# Patient Record
Sex: FEMALE | Race: WHITE | NOT HISPANIC OR LATINO | Employment: FULL TIME | URBAN - METROPOLITAN AREA
[De-identification: names, ages, dates, MRNs, and addresses within clinical notes are randomized per-mention and may not be internally consistent; named-entity substitution may affect disease eponyms.]

---

## 2017-01-03 ENCOUNTER — OFFICE VISIT (OUTPATIENT)
Dept: INTERNAL MEDICINE | Facility: CLINIC | Age: 52
End: 2017-01-03
Payer: COMMERCIAL

## 2017-01-03 VITALS
TEMPERATURE: 97.9 F | DIASTOLIC BLOOD PRESSURE: 80 MMHG | WEIGHT: 171 LBS | HEART RATE: 85 BPM | BODY MASS INDEX: 25.91 KG/M2 | OXYGEN SATURATION: 98 % | SYSTOLIC BLOOD PRESSURE: 110 MMHG | HEIGHT: 68 IN

## 2017-01-03 DIAGNOSIS — Z01.818 PREOP GENERAL PHYSICAL EXAM: Primary | ICD-10-CM

## 2017-01-03 LAB
ANION GAP SERPL CALCULATED.3IONS-SCNC: 8 MMOL/L (ref 3–14)
BUN SERPL-MCNC: 8 MG/DL (ref 7–30)
CALCIUM SERPL-MCNC: 9 MG/DL (ref 8.5–10.1)
CHLORIDE SERPL-SCNC: 105 MMOL/L (ref 94–109)
CO2 SERPL-SCNC: 27 MMOL/L (ref 20–32)
CREAT SERPL-MCNC: 0.76 MG/DL (ref 0.52–1.04)
GFR SERPL CREATININE-BSD FRML MDRD: 80 ML/MIN/1.7M2
GLUCOSE SERPL-MCNC: 93 MG/DL (ref 70–99)
HGB BLD-MCNC: 13.8 G/DL (ref 11.7–15.7)
POTASSIUM SERPL-SCNC: 3.9 MMOL/L (ref 3.4–5.3)
SODIUM SERPL-SCNC: 140 MMOL/L (ref 133–144)

## 2017-01-03 PROCEDURE — 99214 OFFICE O/P EST MOD 30 MIN: CPT | Performed by: INTERNAL MEDICINE

## 2017-01-03 PROCEDURE — 93000 ELECTROCARDIOGRAM COMPLETE: CPT | Performed by: INTERNAL MEDICINE

## 2017-01-03 PROCEDURE — 80048 BASIC METABOLIC PNL TOTAL CA: CPT | Performed by: INTERNAL MEDICINE

## 2017-01-03 PROCEDURE — 85018 HEMOGLOBIN: CPT | Performed by: INTERNAL MEDICINE

## 2017-01-03 PROCEDURE — 36415 COLL VENOUS BLD VENIPUNCTURE: CPT | Performed by: INTERNAL MEDICINE

## 2017-01-03 NOTE — PATIENT INSTRUCTIONS
Before Your Surgery      Call your surgeon if there is any change in your health. This includes signs of a cold or flu (such as a sore throat, runny nose, cough, rash or fever).    Do not smoke, drink alcohol or take over the counter medicine (unless your surgeon or primary care doctor tells you to) for the 24 hours before and after surgery.    If you take prescribed drugs: Follow your doctor s orders about which medicines to take and which to stop until after surgery.    Eating and drinking prior to surgery: follow the instructions from your surgeon    Take a shower or bath the night before surgery. Use the soap your surgeon gave you to gently clean your skin. If you do not have soap from your surgeon, use your regular soap. Do not shave or scrub the surgery site.  Wear clean pajamas and have clean sheets on your bed.

## 2017-01-03 NOTE — NURSING NOTE
"Chief Complaint   Patient presents with     Pre-Op Exam       Initial /80 mmHg  Pulse 85  Temp(Src) 97.9  F (36.6  C) (Oral)  Ht 5' 8\" (1.727 m)  Wt 171 lb (77.565 kg)  BMI 26.01 kg/m2  SpO2 98% Estimated body mass index is 26.01 kg/(m^2) as calculated from the following:    Height as of this encounter: 5' 8\" (1.727 m).    Weight as of this encounter: 171 lb (77.565 kg).  BP completed using cuff size: large    "

## 2017-01-03 NOTE — PROGRESS NOTES
[place] [address]  [facility] [address]  [phone]  Dept: [phone]    PRE-OP EVALUATION:  Today's date: 1/3/2017    Shanna Sutton (: 1965) presents for pre-operative evaluation assessment as requested by Dr. Donna Leon/ Aneesh Schuster.  She requires evaluation and anesthesia risk assessment prior to undergoing surgery/procedure for treatment of family history of breast cancer.  Proposed procedure: prophylactic double mastectomy    Date of Surgery/ Procedure: 17  Time of Surgery/ Procedure: pm  Hospital/Surgical Facility: [facility]  Fax number for surgical facility: [phone]  Primary Physician: Lexi Forrester  Type of Anesthesia Anticipated: General    Patient has a Health Care Directive or Living Will:  NO    1. NO - Do you have a history of heart attack, stroke, stent, bypass or surgery on an artery in the head, neck, heart or legs?  2. NO - Do you ever have any pain or discomfort in your chest?  3. NO - Do you have a history of  Heart Failure?  4. NO - Are you troubled by shortness of breath when: walking on the level, up a slight hill or at night?  5. NO - Do you currently have a cold, bronchitis or other respiratory infection?  6. NO - Do you have a cough, shortness of breath or wheezing?  7. NO - Do you sometimes get pains in the calves of your legs when you walk?  8. NO - Do you or anyone in your family have previous history of blood clots?  9. NO - Do you or does anyone in your family have a serious bleeding problem such as prolonged bleeding following surgeries or cuts?  10. NO - Have you ever had problems with anemia or been told to take iron pills?  11. NO - Have you had any abnormal blood loss such as black, tarry or bloody stools, or abnormal vaginal bleeding?  12. NO - Have you ever had a blood transfusion?  13. NO - Have you or any of your relatives ever had problems with anesthesia?  14. NO - Do you have sleep  apnea, excessive snoring or daytime drowsiness?  15. NO - Do you have any prosthetic heart valves?  16. NO - Do you have prosthetic joints?  17. NO - Is there any chance that you may be pregnant?      HPI:                                                       HYPERLIPIDEMIA - Patient has a long history of Hyperlipidemia,not on meds , on diet and exercise.                                                                                                                                                  .    MEDICAL HISTORY:                                                      Patient Active Problem List    Diagnosis Date Noted     Family history of colon cancer 11/05/2014     Priority: Medium     HYPERLIPIDEMIA LDL GOAL <130 10/31/2010     Priority: Medium     Papanicolaou smear of cervix with atypical squamous cells cannot exclude high grade squamous intraepithelial lesion (ASC-H) 08/21/2007     Priority: Medium     8/21/07 ASCUS pap - HPV  11/20/09 ASCUUS pap - HPV  2/14/11 ASCUS pap - HPV  3/2/11 WNL colposcopy  4/18/12 NIL dx pap to MD for review.       Benign neoplasm of skin      Priority: Medium     Problem list name updated by automated process. Provider to review       Mixed hyperlipidemia 08/03/2005     Priority: Medium        Past Medical History   Diagnosis Date     Slow transit constipation      irritable bowel syndrome  abstracted [id number]     Infectious mononucleosis      Mixed hyperlipidemia      Benign neoplasm of skin, site unspecified      ASCUS on Pap smear 8/21/07, 11/20/09, 2/14/11     - HPV, - HPV, -HPV     History of colposcopy with cervical biopsy 3/1/11     WNL       History reviewed. No pertinent past surgical history.       Current Outpatient Prescriptions   Medication Sig Dispense Refill     NO ACTIVE MEDICATIONS          OTC products: None, except as noted above    Allergies   Allergen Reactions     Amoxicillin       Latex Allergy: NO    Social History   Substance Use Topics     Smoking  "status: Never Smoker      Smokeless tobacco: Not on file     Alcohol Use: 0.0 oz/week     0 Standard drinks or equivalent per week      Comment: Rarely     History   Drug Use No       REVIEW OF SYSTEMS:                                                    C: NEGATIVE for fever, chills, change in weight  I: NEGATIVE for worrisome rashes, moles or lesions  E: NEGATIVE for vision changes or irritation  E/M: NEGATIVE for ear, mouth and throat problems  R: NEGATIVE for significant cough or SOB  B: NEGATIVE for masses, tenderness or discharge  CV: NEGATIVE for chest pain, palpitations or peripheral edema  GI: NEGATIVE for nausea, abdominal pain, heartburn, or change in bowel habits  : NEGATIVE for frequency, dysuria, or hematuria  M: NEGATIVE for significant arthralgias or myalgia  N: NEGATIVE for weakness, dizziness or paresthesias  E: NEGATIVE for temperature intolerance, skin/hair changes  H: NEGATIVE for bleeding problems  P: NEGATIVE for changes in mood or affect    EXAM:                                                    /80 mmHg  Pulse 85  Temp(Src) 97.9  F (36.6  C) (Oral)  Ht 5' 8\" (1.727 m)  Wt 171 lb (77.565 kg)  BMI 26.01 kg/m2  SpO2 98%    GENERAL APPEARANCE: healthy, alert and no distress     EYES: EOMI,- PERRL     HENT: ear canals and TM's normal and nose and mouth without ulcers or lesions     NECK: no adenopathy, no asymmetry, masses, or scars and thyroid normal to palpation     RESP: lungs clear to auscultation - no rales, rhonchi or wheezes     CV: regular rates and rhythm, normal S1 S2, no S3 or S4 and no murmur, click or rub -     ABDOMEN:  soft, nontender, no HSM or masses and bowel sounds normal     MS: extremities normal- no gross deformities noted, no evidence of inflammation in joints, FROM in all extremities.     NEURO: Normal strength and tone, sensory exam grossly normal, mentation intact and speech normal     PSYCH: mentation appears normal. and affect normal/bright    "     DIAGNOSTICS:                                                    EKG: Sinus Rhythm, normal axis, normal intervals, no acute ST/T changes c/w ischemia, no LVH by voltage criteria.  Hemoglobin- pending  Serum Potassium- pending  Serum Creatinine-- pending    Recent Labs   Lab Test  11/05/14   0832  04/18/12   0833   HGB  14.2  14.1   NA  139  138   POTASSIUM  4.1  4.0   CR  0.78  0.70        IMPRESSION:                                                      (Z01.818) Preop general physical exam  (primary encounter diagnosis)  Comment:Prophylactic Bilateral Mastectomy   Plan: Hemoglobin, Basic metabolic panel            The proposed surgical procedure is considered INTERMEDIATE risk.    REVISED CARDIAC RISK INDEX  The patient has the following serious cardiovascular risks for perioperative complications such as (MI, PE, VFib and 3  AV Block):  No serious cardiac risks      The patient has the following additional risks for perioperative complications:  No identified additional risks      ICD-10-CM    1. Preop general physical exam Z01.818 Hemoglobin     Basic metabolic panel       RECOMMENDATIONS:                                                          Anticoagulant or Antiplatelet Medication Use  ASPIRIN: Discontinue ASA 7  days prior to procedure to reduce bleeding risk.   NSAIDS:  Stop 3 days prior to surgery          Signed Electronically by: Lexi Forrester MD    Copy of this evaluation report is provided to requesting physician.    [place] Preop Guidelines

## 2017-01-11 ENCOUNTER — TRANSFERRED RECORDS (OUTPATIENT)
Dept: HEALTH INFORMATION MANAGEMENT | Facility: CLINIC | Age: 52
End: 2017-01-11

## 2017-01-26 ENCOUNTER — TRANSFERRED RECORDS (OUTPATIENT)
Dept: HEALTH INFORMATION MANAGEMENT | Facility: CLINIC | Age: 52
End: 2017-01-26

## 2017-04-03 ENCOUNTER — OFFICE VISIT (OUTPATIENT)
Dept: INTERNAL MEDICINE | Facility: CLINIC | Age: 52
End: 2017-04-03
Payer: COMMERCIAL

## 2017-04-03 VITALS
TEMPERATURE: 98.1 F | DIASTOLIC BLOOD PRESSURE: 70 MMHG | BODY MASS INDEX: 25.76 KG/M2 | SYSTOLIC BLOOD PRESSURE: 110 MMHG | HEART RATE: 99 BPM | WEIGHT: 170 LBS | OXYGEN SATURATION: 95 % | HEIGHT: 68 IN

## 2017-04-03 DIAGNOSIS — Z01.818 PREOP GENERAL PHYSICAL EXAM: Primary | ICD-10-CM

## 2017-04-03 LAB
HGB BLD-MCNC: 13.5 G/DL (ref 11.7–15.7)
POTASSIUM SERPL-SCNC: 4.4 MMOL/L (ref 3.4–5.3)

## 2017-04-03 PROCEDURE — 85018 HEMOGLOBIN: CPT | Performed by: INTERNAL MEDICINE

## 2017-04-03 PROCEDURE — 36415 COLL VENOUS BLD VENIPUNCTURE: CPT | Performed by: INTERNAL MEDICINE

## 2017-04-03 PROCEDURE — 84132 ASSAY OF SERUM POTASSIUM: CPT | Performed by: INTERNAL MEDICINE

## 2017-04-03 PROCEDURE — 99214 OFFICE O/P EST MOD 30 MIN: CPT | Performed by: INTERNAL MEDICINE

## 2017-04-03 NOTE — PROGRESS NOTES
[place] [address]  [facility] [address]  [phone]  Dept: [phone]    PRE-OP EVALUATION:  Today's date: 4/3/2017    Shanna Sutton (: 1965) presents for pre-operative evaluation assessment as requested by Dr. Aneesh Schuster.  She requires evaluation and anesthesia risk assessment prior to undergoing surgery/procedure for treatment of breast reconstruction .  Proposed procedure: bilateral breast reconstruction/implants    Date of Surgery/ Procedure: 17  Time of Surgery/ Procedure: 1130am  Hospital/Surgical Facility: [facility]  Fax number for surgical facility: [phone]  Primary Physician: Lexi Forrester  Type of Anesthesia Anticipated: General    Patient has a Health Care Directive or Living Will:  NO    1. NO - Do you have a history of heart attack, stroke, stent, bypass or surgery on an artery in the head, neck, heart or legs?  2. NO - Do you ever have any pain or discomfort in your chest?  3. NO - Do you have a history of  Heart Failure?  4. NO - Are you troubled by shortness of breath when: walking on the level, up a slight hill or at night?  5. NO - Do you currently have a cold, bronchitis or other respiratory infection?  6. NO - Do you have a cough, shortness of breath or wheezing?  7. NO - Do you sometimes get pains in the calves of your legs when you walk?  8. NO - Do you or anyone in your family have previous history of blood clots?  9. NO - Do you or does anyone in your family have a serious bleeding problem such as prolonged bleeding following surgeries or cuts?  10. NO - Have you ever had problems with anemia or been told to take iron pills?  11. NO - Have you had any abnormal blood loss such as black, tarry or bloody stools, or abnormal vaginal bleeding?  12. NO - Have you ever had a blood transfusion?  13. NO - Have you or any of your relatives ever had problems with anesthesia?  14. NO - Do you have sleep apnea, excessive snoring or  daytime drowsiness?  15. NO - Do you have any prosthetic heart valves?  16. NO - Do you have prosthetic joints?  17. NO - Is there any chance that you may be pregnant?      HPI:                                                       See problem list for active medical problems.  Problems all longstanding and stable, except as noted/documented.  See ROS for pertinent symptoms related to these conditions.                                                                                                  .    MEDICAL HISTORY:                                                      Patient Active Problem List    Diagnosis Date Noted     Family history of colon cancer 11/05/2014     Priority: Medium     HYPERLIPIDEMIA LDL GOAL <130 10/31/2010     Priority: Medium     Papanicolaou smear of cervix with atypical squamous cells cannot exclude high grade squamous intraepithelial lesion (ASC-H) 08/21/2007     Priority: Medium     8/21/07 ASCUS pap - HPV  11/20/09 ASCUUS pap - HPV  2/14/11 ASCUS pap - HPV  3/2/11 WNL colposcopy  4/18/12 NIL dx pap to MD for review.       Benign neoplasm of skin      Priority: Medium     Problem list name updated by automated process. Provider to review       Mixed hyperlipidemia 08/03/2005     Priority: Medium        Past Medical History:   Diagnosis Date     ASCUS on Pap smear 8/21/07, 11/20/09, 2/14/11    - HPV, - HPV, -HPV     Benign neoplasm of skin, site unspecified      History of colposcopy with cervical biopsy 3/1/11    WNL     Infectious mononucleosis      Mixed hyperlipidemia      Slow transit constipation     irritable bowel syndrome  abstracted [id number]       No past surgical history on file.     Current Outpatient Prescriptions   Medication Sig Dispense Refill     NO ACTIVE MEDICATIONS          OTC products: None, except as noted above      Allergies   Allergen Reactions     Amoxicillin       Latex Allergy: NO    Social History   Substance Use Topics     Smoking status: Never Smoker      "Smokeless tobacco: Not on file     Alcohol use 0.0 oz/week     0 Standard drinks or equivalent per week      Comment: Rarely     History   Drug Use No       REVIEW OF SYSTEMS:                                                    C: NEGATIVE for fever, chills, change in weight  I: NEGATIVE for worrisome rashes, moles or lesions  E: NEGATIVE for vision changes or irritation  E/M: NEGATIVE for ear, mouth and throat problems  R: NEGATIVE for significant cough or SOB  B: NEGATIVE for masses, tenderness or discharge  CV: NEGATIVE for chest pain, palpitations or peripheral edema  GI: NEGATIVE for nausea, abdominal pain, heartburn, or change in bowel habits  : NEGATIVE for frequency, dysuria, or hematuria  M: NEGATIVE for significant arthralgias or myalgia  N: NEGATIVE for weakness, dizziness or paresthesias  E: NEGATIVE for temperature intolerance, skin/hair changes  H: NEGATIVE for bleeding problems  P: NEGATIVE for changes in mood or affect    EXAM:                                                    /70 (BP Location: Left arm, Cuff Size: Adult Large)  Pulse 99  Temp 98.1  F (36.7  C) (Oral)  Ht 5' 8\" (1.727 m)  Wt 170 lb (77.1 kg)  LMP 03/22/2017  SpO2 95%  Breastfeeding? No  BMI 25.85 kg/m2    GENERAL APPEARANCE: healthy, alert and no distress     EYES: EOMI, PERRL     HENT: ear canals and TM's normal and nose and mouth without ulcers or lesions     NECK: no adenopathy, no asymmetry, masses, or scars and thyroid normal to palpation     RESP: lungs clear to auscultation - no rales, rhonchi or wheezes     CV: regular rates and rhythm, normal S1 S2, no S3 or S4 and no murmur, click or rub     ABDOMEN:  soft, nontender, no HSM or masses and bowel sounds normal     MS: extremities normal- no gross deformities noted, no evidence of inflammation in joints, FROM in all extremities.     NEURO: Normal strength and tone, sensory exam grossly normal, mentation intact and speech normal     PSYCH: mentation appears " normal. and affect normal/bright      DIAGNOSTICS:                                                    EKG: appears normal, NSR, normal axis, normal intervals, no acute ST/T changes c/w ischemia, no LVH by voltage criteria  Hemoglobin pending  Serum Potassium pending     Recent Labs   Lab Test  01/03/17   0825  11/05/14   0832   HGB  13.8  14.2   NA  140  139   POTASSIUM  3.9  4.1   CR  0.76  0.78        IMPRESSION:                                                        (Z01.818) Preop general physical exam  (primary encounter diagnosis)  Comment: bilateral breast reconstruction/implants  Plan: Hemoglobin, Potassium      (Z23) Need for Tdap vaccination  Plan: TDAP VACCINE (ADACEL)            The proposed surgical procedure is considered LOW risk.    REVISED CARDIAC RISK INDEX  The patient has the following serious cardiovascular risks for perioperative complications such as (MI, PE, VFib and 3  AV Block):  No serious cardiac risks    The patient has the following additional risks for perioperative complications:  No identified additional risks      ICD-10-CM    1. Preop general physical exam Z01.818 Hemoglobin     Potassium       RECOMMENDATIONS:                                                          Anticoagulant or Antiplatelet Medication Use  ASPIRIN: Discontinue ASA 7 days prior to procedure to reduce bleeding risk.  .  NSAIDS:  Stop  3 days prior to surgery            Signed Electronically by: Lexi Forrester MD    Copy of this evaluation report is provided to requesting physician.    [facility] Preop Guidelines

## 2017-04-03 NOTE — MR AVS SNAPSHOT
"              After Visit Summary   4/3/2017    Shanna [place]    MRN: [id number]           Patient Information     Date Of Birth          1965        Visit Information        Provider Department      4/3/2017 7:40 AM Lexi Forrester MD [facility]        Today's Diagnoses     Preop general physical exam    -  1       Follow-ups after your visit        Who to contact     If you have questions or need follow up information about today's clinic visit or your schedule please contact [facility] directly at [phone].  Normal or non-critical lab and imaging results will be communicated to you by [email or web address], letter or phone within 4 business days after the clinic has received the results. If you do not hear from us within 7 days, please contact the clinic through [employer] or phone. If you have a critical or abnormal lab result, we will notify you by phone as soon as possible.  Submit refill requests through [employer] or call your pharmacy and they will forward the refill request to us. Please allow 3 business days for your refill to be completed.          Additional Information About Your Visit        MyChart Information     [employer] gives you secure access to your electronic health record. If you see a primary care provider, you can also send messages to your care team and make appointments. If you have questions, please call your primary care clinic.  If you do not have a primary care provider, please call [phone] and they will assist you.        Care EveryWhere ID     This is your Care EveryWhere ID. This could be used by other organizations to access your [facility] medical records  [id number]        Your Vitals Were     Pulse Temperature Height Last Period Pulse Oximetry Breastfeeding?    99 98.1  F (36.7  C) (Oral) 5' 8\" (1.727 m) 03/22/2017 95% No    BMI (Body Mass Index)                   25.85 kg/m2            Blood Pressure from Last 3 Encounters:   04/03/17 " 110/70   01/03/17 110/80   12/14/15 117/78    Weight from Last 3 Encounters:   04/03/17 170 lb (77.1 kg)   01/03/17 171 lb (77.6 kg)   12/14/15 167 lb (75.8 kg)              We Performed the Following     Hemoglobin     Potassium        Primary Care Provider Office Phone # Fax #    Vanessai RENETTA MD Mitzy [phone]       [facility] [address] [facility] [address]        Thank you!     Thank you for choosing [facility]  for your care. Our goal is always to provide you with excellent care. Hearing back from our patients is one way we can continue to improve our services. Please take a few minutes to complete the written survey that you may receive in the mail after your visit with us. Thank you!             Your Updated Medication List - Protect others around you: Learn how to safely use, store and throw away your medicines at www.disposemymeds.org.          This list is accurate as of: 4/3/17  8:36 AM.  Always use your most recent med list.                   Brand Name Dispense Instructions for use    NO ACTIVE MEDICATIONS

## 2017-04-03 NOTE — NURSING NOTE
"Chief Complaint   Patient presents with     Pre-Op Exam     ABNW, breast reconstruction, 4/20/17       Initial /70 (BP Location: Left arm, Cuff Size: Adult Large)  Pulse 99  Temp 98.1  F (36.7  C) (Oral)  Ht 5' 8\" (1.727 m)  Wt 170 lb (77.1 kg)  LMP 03/22/2017  SpO2 95%  Breastfeeding? No  BMI 25.85 kg/m2 Estimated body mass index is 25.85 kg/(m^2) as calculated from the following:    Height as of this encounter: 5' 8\" (1.727 m).    Weight as of this encounter: 170 lb (77.1 kg).  Medication Reconciliation: complete   Liz Conway CMA      "

## 2017-04-18 ENCOUNTER — TELEPHONE (OUTPATIENT)
Dept: INTERNAL MEDICINE | Facility: CLINIC | Age: 52
End: 2017-04-18

## 2017-04-18 NOTE — TELEPHONE ENCOUNTER
Lyn from [facility], reports pt has surgery scheduled for tomorrow. Requesting pre-op OV note, labs, and latest EKG be faxed to: [phone]. Faxed requested information.

## 2017-08-08 ENCOUNTER — MYC MEDICAL ADVICE (OUTPATIENT)
Dept: INTERNAL MEDICINE | Facility: CLINIC | Age: 52
End: 2017-08-08

## 2017-08-08 DIAGNOSIS — Z12.11 SCREEN FOR COLON CANCER: Primary | ICD-10-CM

## 2017-08-08 NOTE — TELEPHONE ENCOUNTER
See her my chart message.     She has only had 2 pre op appointments. Last OV on 4/3/17.   No physical exams. May have done with GYN.     Please advise if OK for Colonoscopy.

## 2017-09-07 ENCOUNTER — OFFICE VISIT (OUTPATIENT)
Dept: OBGYN | Facility: CLINIC | Age: 52
End: 2017-09-07
Payer: COMMERCIAL

## 2017-09-07 VITALS
DIASTOLIC BLOOD PRESSURE: 78 MMHG | HEIGHT: 68 IN | BODY MASS INDEX: 25.61 KG/M2 | HEART RATE: 80 BPM | SYSTOLIC BLOOD PRESSURE: 110 MMHG | WEIGHT: 169 LBS

## 2017-09-07 DIAGNOSIS — N92.0 EXCESSIVE OR FREQUENT MENSTRUATION: Primary | ICD-10-CM

## 2017-09-07 LAB
ERYTHROCYTE [DISTWIDTH] IN BLOOD BY AUTOMATED COUNT: 13.5 % (ref 10–15)
HCT VFR BLD AUTO: 43.7 % (ref 35–47)
HGB BLD-MCNC: 14.3 G/DL (ref 11.7–15.7)
MCH RBC QN AUTO: 28.7 PG (ref 26.5–33)
MCHC RBC AUTO-ENTMCNC: 32.7 G/DL (ref 31.5–36.5)
MCV RBC AUTO: 88 FL (ref 78–100)
PLATELET # BLD AUTO: 264 10E9/L (ref 150–450)
RBC # BLD AUTO: 4.98 10E12/L (ref 3.8–5.2)
WBC # BLD AUTO: 8.5 10E9/L (ref 4–11)

## 2017-09-07 PROCEDURE — 84443 ASSAY THYROID STIM HORMONE: CPT | Performed by: OBSTETRICS & GYNECOLOGY

## 2017-09-07 PROCEDURE — 85027 COMPLETE CBC AUTOMATED: CPT | Performed by: OBSTETRICS & GYNECOLOGY

## 2017-09-07 PROCEDURE — 99203 OFFICE O/P NEW LOW 30 MIN: CPT | Performed by: OBSTETRICS & GYNECOLOGY

## 2017-09-07 PROCEDURE — 36415 COLL VENOUS BLD VENIPUNCTURE: CPT | Performed by: OBSTETRICS & GYNECOLOGY

## 2017-09-07 NOTE — PROGRESS NOTES
SUBJECTIVE:                                                   Shanna Sutton is a 52 year old female who presents to clinic today for menorrhagia for the last year. Periods are increasingly heavier, lasting up to 9 days with 4 days heavy with clots and cramping. She endorses hot flashes and night sweats, mostly night sweats. No dyspareunia or intermenstrual bleeding.    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 She is status post prophylactic double mastectomy earlier this year with reconstruction. See notes from [facility].    Problem list and histories reviewed & adjusted, as indicated.  Additional history: as documented.    Patient Active Problem List   Diagnosis     Mixed hyperlipidemia     Benign neoplasm of skin     HYPERLIPIDEMIA LDL GOAL <130     Papanicolaou smear of cervix with atypical squamous cells cannot exclude high grade squamous intraepithelial lesion (ASC-H)     Family history of colon cancer     Past Surgical History:   Procedure Laterality Date     Mastectomy Bilateral 01/11/2017    prophylactic double mastectomy: 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, 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      Social History   Substance Use Topics     Smoking status: Never Smoker     Smokeless tobacco: Never Used     Alcohol use 0.0 oz/week      Comment: Rarely      Problem (# of Occurrences) Relation (Name,Age of Onset)    Breast Cancer (4) Mother: age 65, Sister: diagnosed at age 43, Sister: diagnosed at age 58, Sister (Patricia)    Cancer - colorectal (1) Mother: age 73    Family History Negative (2) Brother: 6-healthy, Sister: 5-healthy    Respiratory (1) Father: COPD    Thyroid Disease (2) Sister: 3 sisters, Sister (Patricia)              Current Outpatient Prescriptions on File Prior to Visit:  NO ACTIVE MEDICATIONS      No current facility-administered medications  on file prior to visit.   Allergies   Allergen Reactions     Amoxicillin        ROS:  5 point ROS negative except as noted above in HPI, including Gen., Resp., CV, GI &  system review.    OBJECTIVE:     Exam:  Constitutional:  Appearance: Well nourished, well developed alert, in no acute distress  Gastrointestinal:  Abdominal Examination:  Abdomen nontender to palpation, tone normal without rigidity or guarding, no masses present, umbilicus without lesions; Liver/Spleen:  No hepatomegaly present, liver nontender to palpation; Hernias:  No hernias present  Lymphatic: Lymph Nodes:  No other lymphadenopathy present  Skin:General Inspection:  No rashes present, no lesions present, no areas of discoloration; Genitalia and Groin:  No rashes present, no lesions present, no areas of discoloration, no masses present.  Neurologic/Psychiatric:  Mental Status:  Oriented X3   Pelvic Exam:  External Genitalia:     Normal appearance for age, no discharge present, no tenderness present, no inflammatory lesions present, color normal  Vagina:     Normal vaginal vault without central or paravaginal defects, no discharge present, no inflammatory lesions present, no masses present  Bladder:     Nontender to palpation  Urethra:   Urethral Body:  Urethra palpation normal, urethra structural support normal   Urethral Meatus:  No erythema or lesions present  Cervix:      nontender to palpation, no bleeding present  Uterus:     Uterus: firm, normal to slightly enlarged sized and nontender, retroverted in position.   Adnexa:     No adnexal tenderness present, no adnexal masses present  Perineum:     Perineum within normal limits, no evidence of trauma, no rashes or skin lesions present  Anus:     Anus within normal limits, no hemorrhoids present  Inguinal Lymph Nodes:     No lymphadenopathy present  Pubic Hair:     Normal pubic hair distribution for age  Genitalia and Groin:     No rashes present, no lesions present, no areas of  discoloration, no masses present         In-Clinic Test Results:  No results found for this or any previous visit (from the past 24 hour(s)).    ASSESSMENT/PLAN:                                                        ICD-10-CM    1. Excessive or frequent menstruation N92.0 US Pelvic Complete with Transvaginal     CBC with platelets     TSH with free T4 reflex         Check labs and ultrasound first and will contact with results.    Patient counselled on etiologies of abnormal bleeding, evaluation for etiology, options for treatment including hormonal management, progestin IUD, endometrial ablation, hysterectomy.  Extensive information on all methods given, all questions answered.  Patient will review and follow-up for further treatment.    If she is interested in Mirena IUD placement, this can be done with her return appointment for endometrial biopsy, which will be scheduled after her ultrasound and lab results are back.    Nikole Yarbrough MD  [facility]

## 2017-09-07 NOTE — MR AVS SNAPSHOT
After Visit Summary   9/7/2017    Shanna Sutton    MRN: [id number]           Patient Information     Date Of Birth          1965        Visit Information        Provider Department      9/7/2017 8:45 AM Nikole Yarbrough MD [facility]        Today's Diagnoses     Excessive or frequent menstruation    -  1       Follow-ups after your visit        Your next 10 appointments already scheduled     Sep 11, 2017  5:00 PM CDT   Tea Short with Lexi Forrester MD   [facility] ([facility])    [address] [place] [id number] [phone]            Sep 21, 2017  3:30 PM CDT   US PELVIC COMPLETE W TRANSVAGINAL with RIUS1   [facility] ([facility])    [address]  [address]  [facility] [id number] [phone]           Please bring a list of your medicines (including vitamins, minerals and over-the-counter drugs). Also, tell your doctor about any allergies you may have. Wear comfortable clothes and leave your valuables at home.  Adults: Drink six 8-ounce glasses of fluid one hour before your exam. Do NOT empty your bladder.  If you need to empty your bladder before your exam, try to release only a little bit of urine. Then, drink another 8oz glass of fluid.  Children: Children who are potty trained should drink at least 4 cups (32 oz) of liquid 45 minutes to one hour prior to the exam. The child s bladder must be full in order to achieve a diagnostic exam. If your child is very uncomfortable or has an urgent need to pee, please notify a technologist; they will try to find out how much longer the wait may be and provide instructions to help relieve the pressure. Occasionally it is medically necessary to insert a urinary catheter to fill the bladder.  Please call the Imaging Department at your exam site with any questions.            Sep 26, 2017  8:00 AM CHERRY Metcalf  "Dermatology General with Stephani Mccormick PA-C   [facility] ([facility]    [address] [address] [phone]              Future tests that were ordered for you today     Open Future Orders        Priority Expected Expires Ordered    US Pelvic Complete with Transvaginal Routine  9/7/2018 9/7/2017            Who to contact     If you have questions or need follow up information about today's clinic visit or your schedule please contact [facility] directly at [phone].  Normal or non-critical lab and imaging results will be communicated to you by ZolkChart, letter or phone within 4 business days after the clinic has received the results. If you do not hear from us within 7 days, please contact the clinic through Command Informationt or phone. If you have a critical or abnormal lab result, we will notify you by phone as soon as possible.  Submit refill requests through Mixpanel or call your pharmacy and they will forward the refill request to us. Please allow 3 business days for your refill to be completed.          Additional Information About Your Visit        MyChart Information     Mixpanel gives you secure access to your electronic health record. If you see a primary care provider, you can also send messages to your care team and make appointments. If you have questions, please call your primary care clinic.  If you do not have a primary care provider, please call [phone] and they will assist you.        Care EveryWhere ID     This is your Care EveryWhere ID. This could be used by other organizations to access your [place] medical records  [id number]        Your Vitals Were     Pulse Height Last Period Breastfeeding? BMI (Body Mass Index)       80 5' 8\" (1.727 m) 07/19/2017 (Exact Date) No 25.7 kg/m2        Blood Pressure from Last 3 Encounters:   09/07/17 110/78   04/03/17 110/70   01/03/17 110/80    Weight from Last 3 " Encounters:   09/07/17 169 lb (76.7 kg)   04/03/17 170 lb (77.1 kg)   01/03/17 171 lb (77.6 kg)              We Performed the Following     CBC with platelets     TSH with free T4 reflex        Primary Care Provider Office Phone # Fax #    Matthewsuleman JACKSON MD Mitzy [phone]       [address] [facility] [address]        Equal Access to Services     [facility]: Hadii aad ku hadasho Soomaali, waaxda luqadaha, qaybta kaalmada adeegyada, waxay idiin hayaan adeeg ewaaraviola layolie . So [facility] [phone].    ATENCIÓN: Si habla español, tiene a hill disposición servicios gratuitos de asistencia lingüística. Llame al [phone].    We comply with applicable federal civil rights laws and [place] laws. We do not discriminate on the basis of race, color, national origin, age, disability sex, sexual orientation or gender identity.            Thank you!     Thank you for choosing [facility]  for your care. Our goal is always to provide you with excellent care. Hearing back from our patients is one way we can continue to improve our services. Please take a few minutes to complete the written survey that you may receive in the mail after your visit with us. Thank you!             Your Updated Medication List - Protect others around you: Learn how to safely use, store and throw away your medicines at www.disposemymeds.org.          This list is accurate as of: 9/7/17  9:29 AM.  Always use your most recent med list.                   Brand Name Dispense Instructions for use Diagnosis    NO ACTIVE MEDICATIONS

## 2017-09-07 NOTE — NURSING NOTE
"Chief Complaint   Patient presents with     Consult     Increased bleeding, clots and pain with periods x approx. 1 yr. Also becoming irregular.        Initial /78 (BP Location: Right arm, Patient Position: Sitting, Cuff Size: Adult Regular)  Pulse 80  Ht 5' 8\" (1.727 m)  Wt 169 lb (76.7 kg)  LMP 07/19/2017 (Exact Date)  Breastfeeding? No  BMI 25.7 kg/m2 Estimated body mass index is 25.7 kg/(m^2) as calculated from the following:    Height as of this encounter: 5' 8\" (1.727 m).    Weight as of this encounter: 169 lb (76.7 kg).  Medication Reconciliation: complete   Hoa Worrell LPN      "

## 2017-09-08 LAB — TSH SERPL DL<=0.005 MIU/L-ACNC: 1.53 MU/L (ref 0.4–4)

## 2017-09-11 ENCOUNTER — OFFICE VISIT (OUTPATIENT)
Dept: INTERNAL MEDICINE | Facility: CLINIC | Age: 52
End: 2017-09-11
Payer: COMMERCIAL

## 2017-09-11 VITALS
DIASTOLIC BLOOD PRESSURE: 80 MMHG | OXYGEN SATURATION: 97 % | SYSTOLIC BLOOD PRESSURE: 104 MMHG | WEIGHT: 168.5 LBS | HEIGHT: 68 IN | TEMPERATURE: 97.7 F | BODY MASS INDEX: 25.54 KG/M2

## 2017-09-11 DIAGNOSIS — K21.9 GASTROESOPHAGEAL REFLUX DISEASE, ESOPHAGITIS PRESENCE NOT SPECIFIED: Primary | ICD-10-CM

## 2017-09-11 PROCEDURE — 99214 OFFICE O/P EST MOD 30 MIN: CPT | Performed by: INTERNAL MEDICINE

## 2017-09-11 RX ORDER — OMEPRAZOLE 40 MG/1
40 CAPSULE, DELAYED RELEASE ORAL DAILY
Qty: 30 CAPSULE | Refills: 1 | Status: SHIPPED | OUTPATIENT
Start: 2017-09-11 | End: 2017-12-06

## 2017-09-11 NOTE — MR AVS SNAPSHOT
After Visit Summary   9/11/2017    Shanna Sutton    MRN: [id number]           Patient Information     Date Of Birth          1965        Visit Information        Provider Department      9/11/2017 5:00 PM Lexi Forrester MD [facility]        Today's Diagnoses     Gastroesophageal reflux disease, esophagitis presence not specified    -  1       Follow-ups after your visit        Your next 10 appointments already scheduled     Sep 21, 2017  3:30 PM CDT   US PELVIC COMPLETE W TRANSVAGINAL with RIUS1   [facility] ([facility])    [address]  [address]  [facility] [address] [phone]           Please bring a list of your medicines (including vitamins, minerals and over-the-counter drugs). Also, tell your doctor about any allergies you may have. Wear comfortable clothes and leave your valuables at home.  Adults: Drink six 8-ounce glasses of fluid one hour before your exam. Do NOT empty your bladder.  If you need to empty your bladder before your exam, try to release only a little bit of urine. Then, drink another 8oz glass of fluid.  Children: Children who are potty trained should drink at least 4 cups (32 oz) of liquid 45 minutes to one hour prior to the exam. The child s bladder must be full in order to achieve a diagnostic exam. If your child is very uncomfortable or has an urgent need to pee, please notify a technologist; they will try to find out how much longer the wait may be and provide instructions to help relieve the pressure. Occasionally it is medically necessary to insert a urinary catheter to fill the bladder.  Please call the Imaging Department at your exam site with any questions.            Sep 26, 2017  8:00 AM CDT   MyChart Dermatology General with Stephani Mccormick PA-C   [facility] ([facility])    [address] [address]  "  [phone]              Who to contact     If you have questions or need follow up information about today's clinic visit or your schedule please contact [facility] directly at [phone].  Normal or non-critical lab and imaging results will be communicated to you by MyChart, letter or phone within 4 business days after the clinic has received the results. If you do not hear from us within 7 days, please contact the clinic through Cactushart or phone. If you have a critical or abnormal lab result, we will notify you by phone as soon as possible.  Submit refill requests through Fourandhalf or call your pharmacy and they will forward the refill request to us. Please allow 3 business days for your refill to be completed.          Additional Information About Your Visit        Fourandhalf Information     Fourandhalf gives you secure access to your electronic health record. If you see a primary care provider, you can also send messages to your care team and make appointments. If you have questions, please call your primary care clinic.  If you do not have a primary care provider, please call [phone] and they will assist you.        Care EveryWhere ID     This is your Care EveryWhere ID. This could be used by other organizations to access your [place] medical records  [id number]        Your Vitals Were     Temperature Height Last Period Pulse Oximetry BMI (Body Mass Index)       97.7  F (36.5  C) (Oral) 5' 8\" (1.727 m) 07/19/2017 (Exact Date) 97% 25.62 kg/m2        Blood Pressure from Last 3 Encounters:   09/11/17 104/80   09/07/17 110/78   04/03/17 110/70    Weight from Last 3 Encounters:   09/11/17 168 lb 8 oz (76.4 kg)   09/07/17 169 lb (76.7 kg)   04/03/17 170 lb (77.1 kg)              Today, you had the following     No orders found for display         Today's Medication Changes          These changes are accurate as of: 9/11/17  5:21 PM.  If you have any questions, ask your nurse or doctor.          "      Start taking these medicines.        Dose/Directions    omeprazole 40 MG capsule   Commonly known as:  priLOSEC   Used for:  Gastroesophageal reflux disease, esophagitis presence not specified   Started by:  Lexi Forrester MD        Dose:  40 mg   Take 1 capsule (40 mg) by mouth daily Take 30-60 minutes before a meal.   Quantity:  30 capsule   Refills:  1            Where to get your medicines      These medications were sent to [facility]/pharmacy #[id number] - [place], [place] - [address]  [address], [place] [address]     Phone:  [phone]     omeprazole 40 MG capsule                Primary Care Provider Office Phone # Fax #    Lexi Forrester [phone]       [address] [place] [address]        Equal Access to Services     [facility]: Hadii ursula colin hadasho Sojames, waaxda luqadaha, qaybta kaalmada adeegyada, earnestine mcbride . So [facility] [phone].    ATENCIÓN: Si habla español, tiene a hill disposición servicios gratuitos de asistencia lingüística. Llame al [phone].    We comply with applicable federal civil rights laws and [place] laws. We do not discriminate on the basis of race, color, national origin, age, disability sex, sexual orientation or gender identity.            Thank you!     Thank you for choosing [facility]  for your care. Our goal is always to provide you with excellent care. Hearing back from our patients is one way we can continue to improve our services. Please take a few minutes to complete the written survey that you may receive in the mail after your visit with us. Thank you!             Your Updated Medication List - Protect others around you: Learn how to safely use, store and throw away your medicines at www.disposemymeds.org.          This list is accurate as of: 9/11/17  5:21 PM.  Always use your most recent med list.                   Brand Name Dispense  Instructions for use Diagnosis    NO ACTIVE MEDICATIONS           omeprazole 40 MG capsule    priLOSEC    30 capsule    Take 1 capsule (40 mg) by mouth daily Take 30-60 minutes before a meal.    Gastroesophageal reflux disease, esophagitis presence not specified

## 2017-09-11 NOTE — NURSING NOTE
"Chief Complaint   Patient presents with     Throat Problem     Heartburn - having trouble swalling - painful while eating started at the beginning of summer and getting worse       Initial /80 (BP Location: Right arm, Patient Position: Sitting, Cuff Size: Adult Large)  Temp 97.7  F (36.5  C) (Oral)  Ht 5' 8\" (1.727 m)  Wt 168 lb 8 oz (76.4 kg)  LMP 07/19/2017 (Exact Date)  SpO2 97%  BMI 25.62 kg/m2 Estimated body mass index is 25.62 kg/(m^2) as calculated from the following:    Height as of this encounter: 5' 8\" (1.727 m).    Weight as of this encounter: 168 lb 8 oz (76.4 kg).  Medication Reconciliation: complete   Laurel Mora MA    "

## 2017-09-21 ENCOUNTER — RADIANT APPOINTMENT (OUTPATIENT)
Dept: ULTRASOUND IMAGING | Facility: CLINIC | Age: 52
End: 2017-09-21
Attending: OBSTETRICS & GYNECOLOGY
Payer: COMMERCIAL

## 2017-09-21 DIAGNOSIS — N92.0 EXCESSIVE OR FREQUENT MENSTRUATION: ICD-10-CM

## 2017-09-21 PROCEDURE — 76830 TRANSVAGINAL US NON-OB: CPT | Performed by: FAMILY MEDICINE

## 2017-09-21 PROCEDURE — 76856 US EXAM PELVIC COMPLETE: CPT | Performed by: FAMILY MEDICINE

## 2017-09-26 ENCOUNTER — OFFICE VISIT (OUTPATIENT)
Dept: DERMATOLOGY | Facility: CLINIC | Age: 52
End: 2017-09-26
Payer: COMMERCIAL

## 2017-09-26 VITALS — SYSTOLIC BLOOD PRESSURE: 111 MMHG | HEART RATE: 79 BPM | OXYGEN SATURATION: 96 % | DIASTOLIC BLOOD PRESSURE: 78 MMHG

## 2017-09-26 DIAGNOSIS — D48.5 NEOPLASM OF UNCERTAIN BEHAVIOR OF SKIN: Primary | ICD-10-CM

## 2017-09-26 DIAGNOSIS — L82.1 SEBORRHEIC KERATOSIS: ICD-10-CM

## 2017-09-26 DIAGNOSIS — L81.4 LENTIGO: ICD-10-CM

## 2017-09-26 DIAGNOSIS — D22.9 NEVUS: ICD-10-CM

## 2017-09-26 DIAGNOSIS — D18.00 ANGIOMA: ICD-10-CM

## 2017-09-26 PROCEDURE — 11100 HC BIOPSY SKIN/SUBQ/MUC MEM, SINGLE LESION: CPT | Performed by: PHYSICIAN ASSISTANT

## 2017-09-26 PROCEDURE — 99204 OFFICE O/P NEW MOD 45 MIN: CPT | Mod: 25 | Performed by: PHYSICIAN ASSISTANT

## 2017-09-26 PROCEDURE — 88305 TISSUE EXAM BY PATHOLOGIST: CPT | Performed by: PHYSICIAN ASSISTANT

## 2017-09-26 NOTE — PROGRESS NOTES
HPI:   Shanna Sutton is a 52 year old female who presents for Full skin cancer screening.  chief complaint  Last Skin Exam: n/a      1st Baseline: yes  Personal HX of Skin Cancer: no   Personal HX of Malignant Melanoma: no   Family HX of Skin Cancer / Malignant Melanoma: Dad with a h/o BCC on the nose  Personal HX of Atypical Moles:   no  Risk factors: frequent sun exposure in youth  New / Changing lesions: no  Social History: Just had prophylactic double mastectomy - 4 sisters and a niece with breast CA and they do have a mutation (not brca). She has 2 daughters. Works in [employer].   On review of systems, there are no further skin complaints, patient is feeling otherwise well.  See patient intake sheet.  ROS of the following were done and are negative: Constitutional, Eyes, Ears, Nose,   Mouth, Throat, Cardiovascular, Respiratory, GI, Genitourinary, Musculoskeletal,   Psychiatric, Endocrine, Allergic/Immunologic.    PHYSICAL EXAM:   Weight:  BP:   Skin exam performed as follows: Type 2 skin. Mood appropriate  Alert and Oriented X 3. Well developed, well nourished in no distress.  General appearance: Normal  Head including face: Normal  Eyes: conjunctiva and lids: Normal  Mouth: Lips, teeth, gums: Normal  Neck: Normal  Chest-breast/axillae: Normal  Back: Normal  Spleen and liver: Normal  Cardiovascular: Exam of peripheral vascular system by observation for swelling, varicosities, edema: Normal  Genitalia: groin, buttocks: Normal  Extremities: digits/nails (clubbing): Normal  Eccrine and Apocrine glands: Normal  Right upper extremity: Normal  Left upper extremity: Normal  Right lower extremity: Normal  Left lower extremity: Normal  Skin: Scalp and body hair: See below    Pt deferred exam of breasts, groin, buttocks: No    Other physical findings:  1. Multiple pigmented macules on extremities and trunk  2. Multiple pigmented macules on face, trunk and extremities  3. Multiple vascular papules  on trunk, arms and legs  4. Multiple scattered keratotic plaques  5. 7 mm irregular brown/tan macule on right clavicle       Except as noted above, no other signs of skin cancer or melanoma.     ASSESSMENT/PLAN:   Benign Full skin cancer screening today. . Patient with history of none  Advised on monthly self exams and 1 year  Patient Education: Appropriate brochures given.    Multiple benign appearing nevi on arms, legs and trunk. Discussed ABCDEs of melanoma and sunscreen.   Multiple lentigos on arms, legs and trunk. Advised benign, no treatment needed.  Multiple scattered angiomas. Advised benign, no treatment needed.   Seborrheic keratosis on arms, legs and trunk. Advised benign, no treatment needed.  Atypical nevus on right clavicle - advised. Anesthestized with lidocaine and area cleaned with betadine. Shave removal performed and specimen placed in formalin. Patient will receive call with results.         Follow-up: yearly FSE/pending path/PRN sooner    1.) Patient was asked about new and changing moles. YES  2.) Patient received a complete physical skin examination: YES  3.) Patient was counseled to perform a monthly self skin examination: YES  Scribed By: Stephani Mccormick, MS, PA-C

## 2017-09-26 NOTE — PATIENT INSTRUCTIONS
Wound Care Instructions     FOR SUPERFICIAL WOUNDS     [facility] [phone]                 AFTER 24 HOURS YOU SHOULD REMOVE THE BANDAGE AND BEGIN DAILY DRESSING CHANGES AS FOLLOWS:     1) Remove Dressing.     2) Clean and dry the area with tap water using a Q-tip or sterile gauze pad.     3) Apply Vaseline, Aquaphor, Polysporin ointment or Bacitracin ointment over entire wound.  Do NOT use Neosporin ointment.     4) Cover the wound with a band-aid, or a sterile non-stick gauze pad and micropore paper tape      REPEAT THESE INSTRUCTIONS AT LEAST ONCE A DAY UNTIL THE WOUND HAS COMPLETELY HEALED.    It is an old wives tale that a wound heals better when it is exposed to air and allowed to dry out. The wound will heal faster with a better cosmetic result if it is kept moist with ointment and covered with a bandage.    **Do not let the wound dry out.**      Supplies Needed:      *Cotton tipped applicators (Q-tips)    *Polysporin Ointment or Bacitracin Ointment (NOT NEOSPORIN)    *Band-aids or non-stick gauze pads and micropore paper tape.      PATIENT INFORMATION:    During the healing process you will notice a number of changes. All wounds develop a small halo of redness surrounding the wound.  This means healing is occurring. Severe itching with extensive redness usually indicates sensitivity to the ointment or bandage tape used to dress the wound.  You should call our office if this develops.      Swelling  and/or discoloration around your surgical site is common, particularly when performed around the eye.    All wounds normally drain.  The larger the wound the more drainage there will be.  After 7-10 days, you will notice the wound beginning to shrink and new skin will begin to grow.  The wound is healed when you can see skin has formed over the entire area.  A healed wound has a healthy, shiny look to the surface and is red to dark pink in color to normalize.  Wounds may take approximately 4-6  weeks to heal.  Larger wounds may take 6-8 weeks.  After the wound is healed you may discontinue dressing changes.    You may experience a sensation of tightness as your wound heals. This is normal and will gradually subside.    Your healed wound may be sensitive to temperature changes. This sensitivity improves with time, but if you re having a lot of discomfort, try to avoid temperature extremes.    Patients frequently experience itching after their wound appears to have healed because of the continue healing under the skin.  Plain Vaseline will help relieve the itching.        POSSIBLE COMPLICATIONS    BLEEDIN. Leave the bandage in place.  2. Use tightly rolled up gauze or a cloth to apply direct pressure over the bandage for 30  minutes.  3. Reapply pressure for an additional 30 minutes if necessary  4. Use additional gauze and tape to maintain pressure once the bleeding has stopped.

## 2017-09-26 NOTE — MR AVS SNAPSHOT
After Visit Summary   9/26/2017    Shanna Sutton    MRN: [id number]           Patient Information     Date Of Birth          1965        Visit Information        Provider Department      9/26/2017 8:00 AM Stephani Mccormick PA-C [facility]        Today's Diagnoses     Neoplasm of uncertain behavior of skin    -  1      Care Instructions      Wound Care Instructions     FOR SUPERFICIAL WOUNDS     [facility] [phone]                 AFTER 24 HOURS YOU SHOULD REMOVE THE BANDAGE AND BEGIN DAILY DRESSING CHANGES AS FOLLOWS:     1) Remove Dressing.     2) Clean and dry the area with tap water using a Q-tip or sterile gauze pad.     3) Apply Vaseline, Aquaphor, Polysporin ointment or Bacitracin ointment over entire wound.  Do NOT use Neosporin ointment.     4) Cover the wound with a band-aid, or a sterile non-stick gauze pad and micropore paper tape      REPEAT THESE INSTRUCTIONS AT LEAST ONCE A DAY UNTIL THE WOUND HAS COMPLETELY HEALED.    It is an old wives tale that a wound heals better when it is exposed to air and allowed to dry out. The wound will heal faster with a better cosmetic result if it is kept moist with ointment and covered with a bandage.    **Do not let the wound dry out.**      Supplies Needed:      *Cotton tipped applicators (Q-tips)    *Polysporin Ointment or Bacitracin Ointment (NOT NEOSPORIN)    *Band-aids or non-stick gauze pads and micropore paper tape.      PATIENT INFORMATION:    During the healing process you will notice a number of changes. All wounds develop a small halo of redness surrounding the wound.  This means healing is occurring. Severe itching with extensive redness usually indicates sensitivity to the ointment or bandage tape used to dress the wound.  You should call our office if this develops.      Swelling  and/or discoloration around your surgical site is common, particularly when performed around the eye.    All  wounds normally drain.  The larger the wound the more drainage there will be.  After 7-10 days, you will notice the wound beginning to shrink and new skin will begin to grow.  The wound is healed when you can see skin has formed over the entire area.  A healed wound has a healthy, shiny look to the surface and is red to dark pink in color to normalize.  Wounds may take approximately 4-6 weeks to heal.  Larger wounds may take 6-8 weeks.  After the wound is healed you may discontinue dressing changes.    You may experience a sensation of tightness as your wound heals. This is normal and will gradually subside.    Your healed wound may be sensitive to temperature changes. This sensitivity improves with time, but if you re having a lot of discomfort, try to avoid temperature extremes.    Patients frequently experience itching after their wound appears to have healed because of the continue healing under the skin.  Plain Vaseline will help relieve the itching.        POSSIBLE COMPLICATIONS    BLEEDIN. Leave the bandage in place.  2. Use tightly rolled up gauze or a cloth to apply direct pressure over the bandage for 30  minutes.  3. Reapply pressure for an additional 30 minutes if necessary  4. Use additional gauze and tape to maintain pressure once the bleeding has stopped.            Follow-ups after your visit        Who to contact     If you have questions or need follow up information about today's clinic visit or your schedule please contact [facility] directly at [phone].  Normal or non-critical lab and imaging results will be communicated to you by MyChart, letter or phone within 4 business days after the clinic has received the results. If you do not hear from us within 7 days, please contact the clinic through MyChart or phone. If you have a critical or abnormal lab result, we will notify you by phone as soon as possible.  Submit refill requests through [employer] or call your  pharmacy and they will forward the refill request to us. Please allow 3 business days for your refill to be completed.          Additional Information About Your Visit        [employer] Information     [employer]ickr gives you secure access to your electronic health record. If you see a primary care provider, you can also send messages to your care team and make appointments. If you have questions, please call your primary care clinic.  If you do not have a primary care provider, please call [phone] and they will assist you.        Care EveryWhere ID     This is your Care EveryWhere ID. This could be used by other organizations to access your [place] medical records  [id number]        Your Vitals Were     Pulse Last Period Pulse Oximetry             79 07/19/2017 (Exact Date) 96%          Blood Pressure from Last 3 Encounters:   09/26/17 111/78   09/11/17 104/80   09/07/17 110/78    Weight from Last 3 Encounters:   09/11/17 76.4 kg (168 lb 8 oz)   09/07/17 76.7 kg (169 lb)   04/03/17 77.1 kg (170 lb)              We Performed the Following     BIOPSY SKIN/SUBQ/MUC MEM, SINGLE LESION     Surgical pathology exam          Today's Medication Changes          These changes are accurate as of: 9/26/17  8:28 AM.  If you have any questions, ask your nurse or doctor.               Stop taking these medicines if you haven't already. Please contact your care team if you have questions.     NO ACTIVE MEDICATIONS   Stopped by:  Stephani Mccormick PA-C                    Primary Care Provider Office Phone # Fax #    Krishnakumari G MD Mitzy [phone]       [facility] [id number]        Equal Access to Services     [facility]: Hadii aad ku hadasho Soomaali, waaxda luqadaha, qaybta kaalmada earnestine amos. So [facility] [phone].    ATENCIÓN: Si habla español, tiene a hill disposición servicios gratuitos de asistencia lingüística. Llame al [phone].    We  comply with applicable federal civil rights laws and [place] laws. We do not discriminate on the basis of race, color, national origin, age, disability sex, sexual orientation or gender identity.            Thank you!     Thank you for choosing [facility]  for your care. Our goal is always to provide you with excellent care. Hearing back from our patients is one way we can continue to improve our services. Please take a few minutes to complete the written survey that you may receive in the mail after your visit with us. Thank you!             Your Updated Medication List - Protect others around you: Learn how to safely use, store and throw away your medicines at www.disposemymeds.org.          This list is accurate as of: 9/26/17  8:28 AM.  Always use your most recent med list.                   Brand Name Dispense Instructions for use Diagnosis    omeprazole 40 MG capsule    priLOSEC    30 capsule    Take 1 capsule (40 mg) by mouth daily Take 30-60 minutes before a meal.    Gastroesophageal reflux disease, esophagitis presence not specified

## 2017-09-26 NOTE — NURSING NOTE
"Chief Complaint   Patient presents with     Derm Problem     FSe       Initial /78  Pulse 79  LMP 07/19/2017 (Exact Date)  SpO2 96% Estimated body mass index is 25.62 kg/(m^2) as calculated from the following:    Height as of 9/11/17: 1.727 m (5' 8\").    Weight as of 9/11/17: 76.4 kg (168 lb 8 oz).  Medication Reconciliation: complete    "

## 2017-10-02 LAB — COPATH REPORT: NORMAL

## 2017-10-03 ENCOUNTER — TELEPHONE (OUTPATIENT)
Dept: FAMILY MEDICINE | Facility: CLINIC | Age: 52
End: 2017-10-03

## 2017-10-26 ENCOUNTER — OFFICE VISIT (OUTPATIENT)
Dept: DERMATOLOGY | Facility: CLINIC | Age: 52
End: 2017-10-26
Payer: COMMERCIAL

## 2017-10-26 VITALS — OXYGEN SATURATION: 100 % | DIASTOLIC BLOOD PRESSURE: 77 MMHG | SYSTOLIC BLOOD PRESSURE: 111 MMHG | HEART RATE: 68 BPM

## 2017-10-26 DIAGNOSIS — D48.5 NEOPLASM OF UNCERTAIN BEHAVIOR OF SKIN: Primary | ICD-10-CM

## 2017-10-26 PROCEDURE — 11602 EXC TR-EXT MAL+MARG 1.1-2 CM: CPT | Performed by: DERMATOLOGY

## 2017-10-26 PROCEDURE — 88331 PATH CONSLTJ SURG 1 BLK 1SPC: CPT | Performed by: DERMATOLOGY

## 2017-10-26 NOTE — LETTER
10/26/2017         RE: Shanna Sutton  [address]  [place] [address]        Dear Colleague,    Thank you for referring your patient, Shanna Sutton, to the [facility]. Please see a copy of my visit note below.    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  Patient reports the following modifying factors none.  Associated symptoms: none.  Patient has no other skin complaints today.  Remainder of the HPI, Meds, PMH, Allergies, FH, and SH was reviewed in chart.      Past Medical History:   Diagnosis Date     ASCUS on Pap smear 8/21/07, 11/20/09, 2/14/11    - HPV, - HPV, -HPV     Benign neoplasm of skin, site unspecified      Family history of colon cancer      Family history of colon cancer      History of colposcopy with cervical biopsy 3/1/11    WNL     Infectious mononucleosis      Mixed hyperlipidemia      Slow transit constipation     irritable bowel syndrome  abstracted [id number]       Past Surgical History:   Procedure Laterality Date     BREAST SURGERY  1/11/2017    prophylactic double mastectomy     Mastectomy Bilateral 01/11/2017    prophylactic double mastectomy: 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, 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        Family History   Problem Relation Age of Onset     Breast Cancer Mother      age 65     Cancer - colorectal Mother      age 73     Respiratory Father      COPD     Skin Cancer Father      Family History Negative Brother      6-healthy     Family History Negative Sister      5-healthy     Breast Cancer Sister      diagnosed at age 43     Thyroid Disease Sister      3 sisters     Breast Cancer Sister      diagnosed at age 58     Breast Cancer Sister      Thyroid Disease Sister        Social History     Social History     Marital status:      Spouse name: Heath     Number of children: 2     Years of education: N/A     Occupational History      [employer]      Social History Main Topics     Smoking status: Never Smoker     Smokeless tobacco: Never Used     Alcohol use 0.0 oz/week      Comment: Rarely     Drug use: No     Sexual activity: Yes     Partners: Male     Birth control/ protection: Male Surgical      Comment: vasectomy     Other Topics Concern     Parent/Sibling W/ Cabg, Mi Or Angioplasty Before 65f 55m? No     Social History Narrative       Outpatient Encounter Prescriptions as of 10/26/2017   Medication Sig Dispense Refill     omeprazole (PRILOSEC) 40 MG capsule Take 1 capsule (40 mg) by mouth daily Take 30-60 minutes before a meal. 30 capsule 1     No facility-administered encounter medications on file as of 10/26/2017.              Review Of Systems  Skin: As above  Eyes: negative  Ears/Nose/Throat: negative  Respiratory: No shortness of breath, dyspnea on exertion, cough, or hemoptysis  Cardiovascular: negative  Gastrointestinal: negative  Genitourinary: negative  Musculoskeletal: negative  Neurologic: negative  Psychiatric: negative  Hematologic/Lymphatic/Immunologic: negative  Endocrine: negative      O:   NAD, WDWN, Alert & Oriented, Mood & Affect wnl, Vitals stable   Here today alone   /77  Pulse 68  SpO2 100%   General appearance normal   Vitals stable   Alert, oriented and in no acute distress     R clavicle 1cm red plaque       Eyes: Conjunctivae/lids:Normal     ENT: Lips, buccal mucosa, tongue: normal    MSK:Normal    Cardiovascular: peripheral edema none    Pulm: Breathing Normal    Lymph Nodes: No Head and Neck Lymphadenopathy     Neuro/Psych: Orientation:Normal; Mood/Affect:Normal      MICRO:   R calvicle:Unremarkable epidermis with parallel bundles of cellular collagen within the superficial dermis.  No concerning areas for malignancy.     A/P:  1. R clavicle atypical nevus  MELANOMA DISCUSSED WITH PATIENT:  I discussed the specifics of tumor, prognosis, metachronous melanoma, self exam, and genetics with the patient. I explained the  need for monthly skin exams including and taught the patient how to do this. Patient was asked about new or changing moles   EXCISION OF AN, Margins confirmed with FROZEN SECTIONS AND MART1, AND Second intent: After thorough discussion of PGACAC, consent obtained, anesthesia and prep, the margins of the lesion were identified and an elliptical incision was made encompassing the lesion with 2mm margin. The incisions were made through the skin and down to and including the superficial dermis.  The lesion was removed en bloc and submitted for frozen section pathologic review. Clear margins obtained (1.6cm).  MART1 performed on one section.    REPAIR SECOND INTENT: We discussed the options for wound management in full with the patient including risks/benefits/possible outcomes. Decision made to allow the wound to heal by second intention. EBL minimal; complications none; wound care routine.  The patient was discharged in good condition and will return in one month or prn for wound evaluation.       BENIGN LESIONS DISCUSSED WITH PATIENT:  I discussed the specifics of tumor, prognosis, and genetics of benign lesions.  I explained that treatment of these lesions would be purely cosmetic and not medically neccessary.  I discussed with patient different removal options including excision, cautery and /or laser.      Nature and genetics of benign skin lesions dicussed with patient.  Signs and Symptoms of skin cancer discussed with patient.  ABCDEs of melanoma reviewed with patient.  Patient encouraged to perform monthly skin exams.  UV precautions reviewed with patient.  Skin care regimen reviewed with patient: Eliminate harsh soaps, i.e. Dial, zest, irsih spring; Mild soaps such as Cetaphil or Dove sensitive skin, avoid hot or cold showers, aggressive use of emollients including vanicream, cetaphil or cerave discussed with patient.    Risks of non-melanoma skin cancer discussed with patient   Return to clinic 6  months      Again, thank you for allowing me to participate in the care of your patient.        Sincerely,        Reagan Cook MD

## 2017-10-26 NOTE — NURSING NOTE
Surgical Office Location:  [facility]  [address] [place] [id number]

## 2017-10-26 NOTE — PROGRESS NOTES
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  Patient reports the following modifying factors none.  Associated symptoms: none.  Patient has no other skin complaints today.  Remainder of the HPI, Meds, PMH, Allergies, FH, and SH was reviewed in chart.      Past Medical History:   Diagnosis Date     ASCUS on Pap smear 8/21/07, 11/20/09, 2/14/11    - HPV, - HPV, -HPV     Benign neoplasm of skin, site unspecified      Family history of colon cancer      Family history of colon cancer      History of colposcopy with cervical biopsy 3/1/11    WNL     Infectious mononucleosis      Mixed hyperlipidemia      Slow transit constipation     irritable bowel syndrome  abstracted [id number]       Past Surgical History:   Procedure Laterality Date     BREAST SURGERY  1/11/2017    prophylactic double mastectomy     Mastectomy Bilateral 01/11/2017    prophylactic double mastectomy: 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, 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        Family History   Problem Relation Age of Onset     Breast Cancer Mother      age 65     Cancer - colorectal Mother      age 73     Respiratory Father      COPD     Skin Cancer Father      Family History Negative Brother      6-healthy     Family History Negative Sister      5-healthy     Breast Cancer Sister      diagnosed at age 43     Thyroid Disease Sister      3 sisters     Breast Cancer Sister      diagnosed at age 58     Breast Cancer Sister      Thyroid Disease Sister        Social History     Social History     Marital status:      Spouse name: Heath     Number of children: 2     Years of education: N/A     Occupational History      [employer]     Social History Main Topics     Smoking status: Never Smoker     Smokeless tobacco: Never Used     Alcohol use 0.0 oz/week      Comment: Rarely     Drug use: No     Sexual activity: Yes     Partners: Male     Birth control/ protection: Male Surgical       Comment: vasectomy     Other Topics Concern     Parent/Sibling W/ Cabg, Mi Or Angioplasty Before 65f 55m? No     Social History Narrative       Outpatient Encounter Prescriptions as of 10/26/2017   Medication Sig Dispense Refill     omeprazole (PRILOSEC) 40 MG capsule Take 1 capsule (40 mg) by mouth daily Take 30-60 minutes before a meal. 30 capsule 1     No facility-administered encounter medications on file as of 10/26/2017.              Review Of Systems  Skin: As above  Eyes: negative  Ears/Nose/Throat: negative  Respiratory: No shortness of breath, dyspnea on exertion, cough, or hemoptysis  Cardiovascular: negative  Gastrointestinal: negative  Genitourinary: negative  Musculoskeletal: negative  Neurologic: negative  Psychiatric: negative  Hematologic/Lymphatic/Immunologic: negative  Endocrine: negative      O:   NAD, WDWN, Alert & Oriented, Mood & Affect wnl, Vitals stable   Here today alone   /77  Pulse 68  SpO2 100%   General appearance normal   Vitals stable   Alert, oriented and in no acute distress     R clavicle 1cm red plaque       Eyes: Conjunctivae/lids:Normal     ENT: Lips, buccal mucosa, tongue: normal    MSK:Normal    Cardiovascular: peripheral edema none    Pulm: Breathing Normal    Lymph Nodes: No Head and Neck Lymphadenopathy     Neuro/Psych: Orientation:Normal; Mood/Affect:Normal      MICRO:   R calvicle:Unremarkable epidermis with parallel bundles of cellular collagen within the superficial dermis.  No concerning areas for malignancy.     A/P:  1. R clavicle atypical nevus  MELANOMA DISCUSSED WITH PATIENT:  I discussed the specifics of tumor, prognosis, metachronous melanoma, self exam, and genetics with the patient. I explained the need for monthly skin exams including and taught the patient how to do this. Patient was asked about new or changing moles   EXCISION OF AN, Margins confirmed with FROZEN SECTIONS AND MART1, AND Second intent: After thorough discussion of PGACAC, consent  obtained, anesthesia and prep, the margins of the lesion were identified and an elliptical incision was made encompassing the lesion with 2mm margin. The incisions were made through the skin and down to and including the superficial dermis.  The lesion was removed en bloc and submitted for frozen section pathologic review. Clear margins obtained (1.6cm).  MART1 performed on one section.    REPAIR SECOND INTENT: We discussed the options for wound management in full with the patient including risks/benefits/possible outcomes. Decision made to allow the wound to heal by second intention. EBL minimal; complications none; wound care routine.  The patient was discharged in good condition and will return in one month or prn for wound evaluation.       BENIGN LESIONS DISCUSSED WITH PATIENT:  I discussed the specifics of tumor, prognosis, and genetics of benign lesions.  I explained that treatment of these lesions would be purely cosmetic and not medically neccessary.  I discussed with patient different removal options including excision, cautery and /or laser.      Nature and genetics of benign skin lesions dicussed with patient.  Signs and Symptoms of skin cancer discussed with patient.  ABCDEs of melanoma reviewed with patient.  Patient encouraged to perform monthly skin exams.  UV precautions reviewed with patient.  Skin care regimen reviewed with patient: Eliminate harsh soaps, i.e. Dial, zest, irsih spring; Mild soaps such as Cetaphil or Dove sensitive skin, avoid hot or cold showers, aggressive use of emollients including vanicream, cetaphil or cerave discussed with patient.    Risks of non-melanoma skin cancer discussed with patient   Return to clinic 6 months

## 2017-10-26 NOTE — PATIENT INSTRUCTIONS
Open Wound Care     for ______________        ? No strenuous activity for 48 hours    ? Take Tylenol as needed for discomfort.                                                .         ? Do not drink alcoholic beverages for 48 hours.    ? Keep the pressure bandage in place for 24 hours. If the bandage becomes blood tinged or loose, reinforce it with gauze and tape.        (Refer to the reverse side of this page for management of bleeding).    ? Remove bandage in 24 hours and begin wound care as follows:     1. Clean area with tap water using a Q tip or gauze pad, (shower / bathe normally)  2. Dry wound with Q tip or gauze pad  3. Apply Aquaphor, Vaseline, Polysporin or Bacitracin Ointment with a Q tip    Do NOT use Neosporin Ointment *  4. Cover the wound with a band-aid or nonstick gauze pad and paper tape.  5. Repeat wound care once a day until wound is completely healed.    It is an old wives tale that a wound heals better when it is exposed to air and allowed to dry out. The wound will heal faster with a better cosmetic result if it is kept moist with ointment and covered with a bandage.  Do not let the wound dry out.      Supplies Needed:                Qtips or gauze pads                Polysporin or Bacitracin Ointment                Bandaids or nonstick gauze pads and paper tape    Wound care kits and brown paper tape are available for purchase at   the pharmacy.    BLEEDIN. Use tightly rolled up gauze or cloth to apply direct pressure over the bandage for 20   minutes.  2. Reapply pressure for an additional 20 minutes if necessary  3. Call the office or go to the nearest emergency room if pressure fails to stop the bleeding.  4. Use additional gauze and tape to maintain pressure once the bleeding has stopped.  5. Begin wound care 24 hours after surgery as directed.                  WOUND HEALING    1. One week after surgery a pink / red halo will form around the outside of the wound.   This is new  skin.  2. The center of the wound will appear yellowish white and produce some drainage.  3. The pink halo will slowly migrate in toward the center of the wound until the wound is covered with new shiny pink skin.  4. There will be no more drainage when the wound is completely healed.  5. It will take six months to one year for the redness to fade.  6. The scar may be itchy, tight and sensitive to extreme temperatures for a year after the surgery.  7. Massaging the area several times a day for several minutes after the wound is completely healed will help the scar soften and normalize faster. Begin massage only after healing is complete.      In case of emergency call: Dr Cook: [phone]     [facility]: [phone]    [facility]: [phone]

## 2017-10-26 NOTE — NURSING NOTE
"Chief Complaint   Patient presents with     RECHECK       Initial /77  Pulse 68  SpO2 100% Estimated body mass index is 25.62 kg/(m^2) as calculated from the following:    Height as of 9/11/17: 1.727 m (5' 8\").    Weight as of 9/11/17: 76.4 kg (168 lb 8 oz).      Patient prefers to be contacted via at Home.   Okay to leave detailed message: Yes  Patient uses MyChart: Yes    Ca STEPHENS LPN    "

## 2017-10-26 NOTE — MR AVS SNAPSHOT
After Visit Summary   10/26/2017    Shanna Sutton    MRN: [id number]           Patient Information     Date Of Birth          1965        Visit Information        Provider Department      10/26/2017 9:30 AM Reagan Cook MD [facility]        Care Instructions    Open Wound Care     for ______________        ? No strenuous activity for 48 hours    ? Take Tylenol as needed for discomfort.                                                .         ? Do not drink alcoholic beverages for 48 hours.    ? Keep the pressure bandage in place for 24 hours. If the bandage becomes blood tinged or loose, reinforce it with gauze and tape.        (Refer to the reverse side of this page for management of bleeding).    ? Remove bandage in 24 hours and begin wound care as follows:     1. Clean area with tap water using a Q tip or gauze pad, (shower / bathe normally)  2. Dry wound with Q tip or gauze pad  3. Apply Aquaphor, Vaseline, Polysporin or Bacitracin Ointment with a Q tip    Do NOT use Neosporin Ointment *  4. Cover the wound with a band-aid or nonstick gauze pad and paper tape.  5. Repeat wound care once a day until wound is completely healed.    It is an old wives tale that a wound heals better when it is exposed to air and allowed to dry out. The wound will heal faster with a better cosmetic result if it is kept moist with ointment and covered with a bandage.  Do not let the wound dry out.      Supplies Needed:                Qtips or gauze pads                Polysporin or Bacitracin Ointment                Bandaids or nonstick gauze pads and paper tape    Wound care kits and brown paper tape are available for purchase at   the pharmacy.    BLEEDIN. Use tightly rolled up gauze or cloth to apply direct pressure over the bandage for 20   minutes.  2. Reapply pressure for an additional 20 minutes if necessary  3. Call the office or go to the nearest emergency room  if pressure fails to stop the bleeding.  4. Use additional gauze and tape to maintain pressure once the bleeding has stopped.  5. Begin wound care 24 hours after surgery as directed.                  WOUND HEALING    1. One week after surgery a pink / red halo will form around the outside of the wound.   This is new skin.  2. The center of the wound will appear yellowish white and produce some drainage.  3. The pink halo will slowly migrate in toward the center of the wound until the wound is covered with new shiny pink skin.  4. There will be no more drainage when the wound is completely healed.  5. It will take six months to one year for the redness to fade.  6. The scar may be itchy, tight and sensitive to extreme temperatures for a year after the surgery.  7. Massaging the area several times a day for several minutes after the wound is completely healed will help the scar soften and normalize faster. Begin massage only after healing is complete.      In case of emergency call: Dr Cook: [phone]     [facility]: [phone]    [facility]: [phone]            Follow-ups after your visit        Your next 10 appointments already scheduled     Dec 05, 2017  9:45 AM CST   Office Visit with Nikole Yarbrough MD   [facility]    [facility] [address]  [facility] [address] [phone]           Bring a current list of meds and any records pertaining to this visit. For Physicals, please bring immunization records and any forms needing to be filled out. Please arrive 10 minutes early to complete paperwork.              Who to contact     If you have questions or need follow up information about today's clinic visit or your schedule please contact [facility] directly at [phone].  Normal or non-critical lab and imaging results will be communicated to you by MyChart, letter or phone within 4 business days after  the clinic has received the results. If you do not hear from us within 7 days, please contact the clinic through [employer] or phone. If you have a critical or abnormal lab result, we will notify you by phone as soon as possible.  Submit refill requests through [employer] or call your pharmacy and they will forward the refill request to us. Please allow 3 business days for your refill to be completed.          Additional Information About Your Visit        [employer] Information     [employer] gives you secure access to your electronic health record. If you see a primary care provider, you can also send messages to your care team and make appointments. If you have questions, please call your primary care clinic.  If you do not have a primary care provider, please call [phone] and they will assist you.        Care EveryWhere ID     This is your Care EveryWhere ID. This could be used by other organizations to access your [facility] medical records  [id number]        Your Vitals Were     Pulse Pulse Oximetry                68 100%           Blood Pressure from Last 3 Encounters:   10/26/17 111/77   09/26/17 111/78   09/11/17 104/80    Weight from Last 3 Encounters:   09/11/17 76.4 kg (168 lb 8 oz)   09/07/17 76.7 kg (169 lb)   04/03/17 77.1 kg (170 lb)              Today, you had the following     No orders found for display       Primary Care Provider Office Phone # Fax #    Krishnakumari G MD Mitzy [phone]       [address] [facility] [address]        Equal Access to Services     [facility] : Hadii aad ku hadasho Soomaali, waaxda luqadaha, qaybta kaalmada adeegyada, earnestine mcbride . So [facility] [phone].    ATENCIÓN: Si habla español, tiene a hill disposición servicios gratuitos de asistencia lingüística. Llame al [phone].    We comply with applicable federal civil rights laws and [place] laws. We do not discriminate on the basis of race, color, national origin,  age, disability, sex, sexual orientation, or gender identity.            Thank you!     Thank you for choosing [facility]  for your care. Our goal is always to provide you with excellent care. Hearing back from our patients is one way we can continue to improve our services. Please take a few minutes to complete the written survey that you may receive in the mail after your visit with us. Thank you!             Your Updated Medication List - Protect others around you: Learn how to safely use, store and throw away your medicines at www.disposemymeds.org.          This list is accurate as of: 10/26/17  9:55 AM.  Always use your most recent med list.                   Brand Name Dispense Instructions for use Diagnosis    omeprazole 40 MG capsule    priLOSEC    30 capsule    Take 1 capsule (40 mg) by mouth daily Take 30-60 minutes before a meal.    Gastroesophageal reflux disease, esophagitis presence not specified

## 2017-12-05 ENCOUNTER — OFFICE VISIT (OUTPATIENT)
Dept: OBGYN | Facility: CLINIC | Age: 52
End: 2017-12-05
Payer: COMMERCIAL

## 2017-12-05 VITALS
SYSTOLIC BLOOD PRESSURE: 122 MMHG | HEIGHT: 68 IN | DIASTOLIC BLOOD PRESSURE: 76 MMHG | BODY MASS INDEX: 26.22 KG/M2 | HEART RATE: 66 BPM | WEIGHT: 173 LBS

## 2017-12-05 DIAGNOSIS — N93.8 DUB (DYSFUNCTIONAL UTERINE BLEEDING): Primary | ICD-10-CM

## 2017-12-05 DIAGNOSIS — Z30.430 ENCOUNTER FOR INSERTION OF INTRAUTERINE CONTRACEPTIVE DEVICE (IUD): ICD-10-CM

## 2017-12-05 PROCEDURE — 58100 BIOPSY OF UTERUS LINING: CPT | Performed by: OBSTETRICS & GYNECOLOGY

## 2017-12-05 PROCEDURE — 88305 TISSUE EXAM BY PATHOLOGIST: CPT | Performed by: OBSTETRICS & GYNECOLOGY

## 2017-12-05 PROCEDURE — 58300 INSERT INTRAUTERINE DEVICE: CPT | Performed by: OBSTETRICS & GYNECOLOGY

## 2017-12-05 NOTE — PROGRESS NOTES
INDICATIONS:                                                      Having endometrial biopsy for dysfunctional uterine bleeding . US was normal with exception of small fibroid, not affecting the cavity. She is interested in Mirena IUD insertion for treatment as well.    PROCEDURE;                                                      A speculum was placed in the vagina and cervix prepped with betadine. A tenaculum was not attached to the cervix. A small Pipelle curette was inserted into the cervical canal. The uterus was sounded to 6 cm. A vigorous four quadrant biopsy was performed, removing moderate amount of tissue.    A Mirena IUD was then inserted without difficulty. The string was cut to 3 cm.  The patient experienced a mild amount of cramping.    The patient tolerated the procedure  well.    POST PROCEDURE;                                                      There  was no cramping at the time of discharge. She  tolerated the procedure well. There were no complications. Patient was discharged in stable condition.    Patient advised to call the clinic if severe pelvic pain, fever or heavy bleeding.    Will contact with biopsy results when available. She will follow up in 4-6 weeks for IUD recheck.    Nikole Yarbrough MD

## 2017-12-05 NOTE — NURSING NOTE
"Chief Complaint   Patient presents with     Biopsy     EMB and IUD insert.        Initial /76 (BP Location: Right arm, Patient Position: Sitting, Cuff Size: Adult Regular)  Pulse 66  Ht 5' 8\" (1.727 m)  Wt 173 lb (78.5 kg)  LMP 12/02/2017  Breastfeeding? No  BMI 26.3 kg/m2 Estimated body mass index is 26.3 kg/(m^2) as calculated from the following:    Height as of this encounter: 5' 8\" (1.727 m).    Weight as of this encounter: 173 lb (78.5 kg).  Medication Reconciliation: complete   Hoa Worrell LPN      "

## 2017-12-05 NOTE — MR AVS SNAPSHOT
After Visit Summary   12/5/2017    Shanna Sutton    MRN: [id number]           Patient Information     Date Of Birth          1965        Visit Information        Provider Department      12/5/2017 9:45 AM Nikole Yarbrough MD [facility]        Today's Diagnoses     DUB (dysfunctional uterine bleeding)    -  1    Encounter for insertion of intrauterine contraceptive device (IUD)           Follow-ups after your visit        Your next 10 appointments already scheduled     Jan 11, 2018  8:30 AM CST   SHORT with Nikole Yarbrough MD   [facility]    [address]  [facility] [id number] [phone]              Who to contact     If you have questions or need follow up information about today's clinic visit or your schedule please contact [facility] directly at [phone].  Normal or non-critical lab and imaging results will be communicated to you by MyChart, letter or phone within 4 business days after the clinic has received the results. If you do not hear from us within 7 days, please contact the clinic through MyChart or phone. If you have a critical or abnormal lab result, we will notify you by phone as soon as possible.  Submit refill requests through [employer] or call your pharmacy and they will forward the refill request to us. Please allow 3 business days for your refill to be completed.          Additional Information About Your Visit        MyChart Information     [employer] gives you secure access to your electronic health record. If you see a primary care provider, you can also send messages to your care team and make appointments. If you have questions, please call your primary care clinic.  If you do not have a primary care provider, please call [phone] and they will assist you.        Care EveryWhere ID     This is your Care EveryWhere ID. This could be used by other organizations to access  "your [place] medical records  [id number]        Your Vitals Were     Pulse Height Last Period Breastfeeding? BMI (Body Mass Index)       66 5' 8\" (1.727 m) 12/02/2017 No 26.3 kg/m2        Blood Pressure from Last 3 Encounters:   12/05/17 122/76   10/26/17 111/77   09/26/17 111/78    Weight from Last 3 Encounters:   12/05/17 173 lb (78.5 kg)   09/11/17 168 lb 8 oz (76.4 kg)   09/07/17 169 lb (76.7 kg)              We Performed the Following     ENDOMETRIAL BIOPSY W/O CERVICAL DILATION     INSERTION INTRAUTERINE DEVICE     Mirena     Surgical pathology exam        Primary Care Provider Office Phone # Fax #    Lexi JACKSON MD Mitzy [phone]       [address] [facility] [address]        Equal Access to Services     [facility]: Hadii aad ku hadasho Soomaali, waaxda luqadaha, qaybta kaalmada adeegyada, waxay elizabethin hayaajose juan mcbride . So [facility] [phone].    ATENCIÓN: Si habla español, tiene a hill disposición servicios gratuitos de asistencia lingüística. Llame al [phone].    We comply with applicable federal civil rights laws and [place] laws. We do not discriminate on the basis of race, color, national origin, age, disability, sex, sexual orientation, or gender identity.            Thank you!     Thank you for choosing [facility]  for your care. Our goal is always to provide you with excellent care. Hearing back from our patients is one way we can continue to improve our services. Please take a few minutes to complete the written survey that you may receive in the mail after your visit with us. Thank you!             Your Updated Medication List - Protect others around you: Learn how to safely use, store and throw away your medicines at www.disposemymeds.org.          This list is accurate as of: 12/5/17 11:18 AM.  Always use your most recent med list.                   Brand Name Dispense Instructions for use Diagnosis    omeprazole 40 MG capsule "    priLOSEC    30 capsule    Take 1 capsule (40 mg) by mouth daily Take 30-60 minutes before a meal.    Gastroesophageal reflux disease, esophagitis presence not specified

## 2017-12-06 DIAGNOSIS — K21.9 GASTROESOPHAGEAL REFLUX DISEASE, ESOPHAGITIS PRESENCE NOT SPECIFIED: ICD-10-CM

## 2017-12-07 LAB — COPATH REPORT: NORMAL

## 2017-12-08 RX ORDER — OMEPRAZOLE 40 MG/1
CAPSULE, DELAYED RELEASE ORAL
Qty: 90 CAPSULE | Refills: 2 | Status: SHIPPED | OUTPATIENT
Start: 2017-12-08 | End: 2019-06-27

## 2018-01-02 NOTE — TELEPHONE ENCOUNTER
Patient notified of test results and PCP's message, no further questions. Letter mailed. Appointment scheduled  Christine Escobedo RN  [facility]  [phone]    
Pt called back to return your call. She can be reached at [phone]. Thank you.  Destiny Ying,     
Other

## 2018-01-03 ENCOUNTER — TELEPHONE (OUTPATIENT)
Dept: INTERNAL MEDICINE | Facility: CLINIC | Age: 53
End: 2018-01-03

## 2018-01-03 NOTE — TELEPHONE ENCOUNTER
"01/03/2018      Patient Communication Preferences indicate  Do not contact  and/or communication by \"Phone\" is not preferred. Call not required per Outreach team.        Outreach ,  Suraj Braun     "

## 2018-01-11 ENCOUNTER — OFFICE VISIT (OUTPATIENT)
Dept: OBGYN | Facility: CLINIC | Age: 53
End: 2018-01-11
Payer: COMMERCIAL

## 2018-01-11 VITALS
HEART RATE: 88 BPM | DIASTOLIC BLOOD PRESSURE: 72 MMHG | BODY MASS INDEX: 26.22 KG/M2 | WEIGHT: 173 LBS | HEIGHT: 68 IN | SYSTOLIC BLOOD PRESSURE: 118 MMHG

## 2018-01-11 DIAGNOSIS — Z30.431 CHECKING OF INTRAUTERINE DEVICE: ICD-10-CM

## 2018-01-11 DIAGNOSIS — N93.8 DUB (DYSFUNCTIONAL UTERINE BLEEDING): Primary | ICD-10-CM

## 2018-01-11 PROCEDURE — 99214 OFFICE O/P EST MOD 30 MIN: CPT | Performed by: OBSTETRICS & GYNECOLOGY

## 2018-01-11 NOTE — PROGRESS NOTES
"  SUBJECTIVE:                                                   Shanna Sutton is a 52 year old female who presents to clinic today for the following health issue(s):  Patient presents with:  RECHECK: IUD check. Has had bleeding, cramping and low back.       HPI:  Here for follow up of Mirena IUD. She reports ongoing bleeding, sometimes spotting to heavier spotting, and just finished a \"regular period.\" The period was somewhat lighter and less crampy than she is used to, but she is bothered by the continual spotting. She denies fever or chills.       Problem list and histories reviewed & adjusted, as indicated.  Additional history: as documented.    Patient Active Problem List   Diagnosis     Mixed hyperlipidemia     Benign neoplasm of skin     HYPERLIPIDEMIA LDL GOAL <130     Papanicolaou smear of cervix with atypical squamous cells cannot exclude high grade squamous intraepithelial lesion (ASC-H)     Family history of colon cancer     Gastroesophageal reflux disease, esophagitis presence not specified     Mirena IUD insertion 12/5/2017 - remove on or before 12/5/2022     Past Surgical History:   Procedure Laterality Date     BREAST SURGERY  1/11/2017    prophylactic double mastectomy     Mastectomy Bilateral 01/11/2017    prophylactic double mastectomy: 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, 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      Social History   Substance Use Topics     Smoking status: Never Smoker     Smokeless tobacco: Never Used     Alcohol use 0.0 oz/week      Comment: Rarely      Problem (# of Occurrences) Relation (Name,Age of Onset)    Breast Cancer (4) Mother: age 65, Sister: diagnosed at age 43, Sister: diagnosed at age 58, Sister (Patricia)    Cancer - colorectal (1) Mother: age 73    Family History Negative (2) Brother: 6-healthy, Sister: 5-healthy    Respiratory (1) Father: COPD    Skin Cancer (1) Father    Thyroid Disease (2) Sister: 3 sisters, Sister (Patricia)              Current Outpatient Prescriptions on File Prior to Visit:  omeprazole " "(PRILOSEC) 40 MG capsule TAKE 1 CAPSULE (40 MG) BY MOUTH DAILY TAKE 30-60 MINUTES BEFORE A MEAL.     No current facility-administered medications on file prior to visit.   Allergies   Allergen Reactions     Amoxicillin Hives       ROS:  5 point ROS negative except as noted in HPI.    OBJECTIVE:     /72 (BP Location: Right arm, Patient Position: Sitting, Cuff Size: Adult Regular)  Pulse 88  Ht 5' 8\" (1.727 m)  Wt 173 lb (78.5 kg)  LMP 12/14/2017  Breastfeeding? No  BMI 26.3 kg/m2  Body mass index is 26.3 kg/(m^2).    Exam:  Pelvis: External genitalia, [place]th[place], urethral, and [place] glands within normal limits. Urethra is without lesion and nontender to palpation. Bladder is nontender. On speculum exam, cervix is without lesion and vagina is normal without lesion or discharge. Mirena IUD strings are visualized at 3 cm and no other portion of the IUD is seen.      In-Clinic Test Results:  No results found for this or any previous visit (from the past 24 hour(s)).    ASSESSMENT/PLAN:                                                        ICD-10-CM    1. DUB (dysfunctional uterine bleeding) N93.8    2. Checking of intrauterine device Z30.431          She is willing to give the IUD another 4-8 weeks to see if her symptoms resolve. We talked about the time frame to expect results. If she continues to have issues, she would consider ablation as the next step. We discussed risks, benefits, and alternatives to ablation and hysterectomy today at her request.    Nikole Yarbrough MD  [facility]    "

## 2018-01-11 NOTE — NURSING NOTE
"Chief Complaint   Patient presents with     RECHECK     IUD check. Has had bleeding, cramping and low back.        Initial /72 (BP Location: Right arm, Patient Position: Sitting, Cuff Size: Adult Regular)  Pulse 88  Ht 5' 8\" (1.727 m)  Wt 173 lb (78.5 kg)  LMP 12/14/2017  Breastfeeding? No  BMI 26.3 kg/m2 Estimated body mass index is 26.3 kg/(m^2) as calculated from the following:    Height as of this encounter: 5' 8\" (1.727 m).    Weight as of this encounter: 173 lb (78.5 kg).  Medication Reconciliation: complete   Hoa Worrell LPN      "

## 2018-01-11 NOTE — MR AVS SNAPSHOT
"              After Visit Summary   1/11/2018    Shanna Sutton    MRN: [id number]           Patient Information     Date Of Birth          1965        Visit Information        Provider Department      1/11/2018 8:30 AM Nikole Yarbrough MD [facility]        Today's Diagnoses     DUB (dysfunctional uterine bleeding)    -  1    Checking of intrauterine device           Follow-ups after your visit        Who to contact     If you have questions or need follow up information about today's clinic visit or your schedule please contact [facility] directly at [phone].  Normal or non-critical lab and imaging results will be communicated to you by Etubicshart, letter or phone within 4 business days after the clinic has received the results. If you do not hear from us within 7 days, please contact the clinic through HedgeChattert or phone. If you have a critical or abnormal lab result, we will notify you by phone as soon as possible.  Submit refill requests through Innobits or call your pharmacy and they will forward the refill request to us. Please allow 3 business days for your refill to be completed.          Additional Information About Your Visit        MyChart Information     Innobits gives you secure access to your electronic health record. If you see a primary care provider, you can also send messages to your care team and make appointments. If you have questions, please call your primary care clinic.  If you do not have a primary care provider, please call [phone] and they will assist you.        Care EveryWhere ID     This is your Care EveryWhere ID. This could be used by other organizations to access your [place] medical records  [id number]        Your Vitals Were     Pulse Height Last Period Breastfeeding? BMI (Body Mass Index)       88 5' 8\" (1.727 m) 12/14/2017 No 26.3 kg/m2        Blood Pressure from Last 3 Encounters:   01/11/18 118/72   12/05/17 122/76   10/26/17 " 111/77    Weight from Last 3 Encounters:   01/11/18 173 lb (78.5 kg)   12/05/17 173 lb (78.5 kg)   09/11/17 168 lb 8 oz (76.4 kg)              Today, you had the following     No orders found for display       Primary Care Provider Office Phone # Fax #    Lexi JACKSON MD [facility] [phone]       [address] [facility] [address]        Equal Access to Services     [facility] : Hadii aad ku hadasho Soomaali, waaxda luqadaha, qaybta kaalmada adeegyada, waxay idiin hayaan adeeg kharash la'jaskarann . So [facility] [phone].    ATENCIÓN: Si habla español, tiene a hill disposición servicios gratuitos de asistencia lingüística. [facility] [phone].    We comply with applicable federal civil rights laws and [place] laws. We do not discriminate on the basis of race, color, national origin, age, disability, sex, sexual orientation, or gender identity.            Thank you!     Thank you for choosing [facility]  for your care. Our goal is always to provide you with excellent care. Hearing back from our patients is one way we can continue to improve our services. Please take a few minutes to complete the written survey that you may receive in the mail after your visit with us. Thank you!             Your Updated Medication List - Protect others around you: Learn how to safely use, store and throw away your medicines at www.disposemymeds.org.          This list is accurate as of: 1/11/18 11:41 AM.  Always use your most recent med list.                   Brand Name Dispense Instructions for use Diagnosis    omeprazole 40 MG capsule    priLOSEC    90 capsule    TAKE 1 CAPSULE (40 MG) BY MOUTH DAILY TAKE 30-60 MINUTES BEFORE A MEAL.    Gastroesophageal reflux disease, esophagitis presence not specified

## 2018-10-22 ENCOUNTER — OFFICE VISIT (OUTPATIENT)
Dept: INTERNAL MEDICINE | Facility: CLINIC | Age: 53
End: 2018-10-22
Payer: COMMERCIAL

## 2018-10-22 VITALS
RESPIRATION RATE: 12 BRPM | OXYGEN SATURATION: 99 % | SYSTOLIC BLOOD PRESSURE: 104 MMHG | TEMPERATURE: 98.1 F | DIASTOLIC BLOOD PRESSURE: 70 MMHG | WEIGHT: 166.5 LBS | BODY MASS INDEX: 25.32 KG/M2 | HEART RATE: 89 BPM

## 2018-10-22 DIAGNOSIS — K64.4 EXTERNAL HEMORRHOIDS: Primary | ICD-10-CM

## 2018-10-22 DIAGNOSIS — M70.62 TROCHANTERIC BURSITIS OF LEFT HIP: ICD-10-CM

## 2018-10-22 PROCEDURE — 99214 OFFICE O/P EST MOD 30 MIN: CPT | Performed by: INTERNAL MEDICINE

## 2018-10-22 NOTE — PROGRESS NOTES
SUBJECTIVE:   Shanna Sutton is a 53 year old female who presents to clinic today for the following health issues:    Pt is a 53 year old female who is seen here to day with c/o hemorrhoids on and off since several yrs but flared up since 07/18 , has been painful with BM, has noticed occ blood on tissue wipes. Pt has been taking on and off stool softeners and hemorrhoid painful when she stops the stool softener. Pt says she is afraid to get colonoscopy due to hemorrhoids.      Pt also complains of lt hip pain on and off since 3 yrs but more since 2 mths, no radiation to lt LE. There is no numbness/tingling in bilateral LE.No bladder or bowel incontinence.no weakness,pain is worse laying on lt side and better after walking.pt taking occ advil for the pain. Pt does walk for exercise        Past Medical History:   Diagnosis Date     ASCUS on Pap smear 8/21/07, 11/20/09, 2/14/11    - HPV, - HPV, -HPV     Benign neoplasm of skin, site unspecified      Family history of colon cancer      Family history of colon cancer      History of colposcopy with cervical biopsy 3/1/11    WNL     Infectious mononucleosis      Mixed hyperlipidemia      Slow transit constipation     irritable bowel syndrome  abstracted [id number]            Current Outpatient Prescriptions   Medication Sig Dispense Refill     omeprazole (PRILOSEC) 40 MG capsule TAKE 1 CAPSULE (40 MG) BY MOUTH DAILY TAKE 30-60 MINUTES BEFORE A MEAL. 90 capsule 2         Ros;  General;negative  MS;lt hip pain.  ; hemorrhoids  Resp;negative  Cns;negative      Blood pressure 104/70, pulse 89, temperature 98.1  F (36.7  C), temperature source Oral, resp. rate 12, weight 166 lb 8 oz (75.5 kg), SpO2 99 %, not currently breastfeeding.  GENERAL:healthy, alert and no distress  Resp;normal breath sounds  CVS; regular rate and rhythm   MS:Lumbo-sacral spine area reveals no tenderness on lumbar spine.Tenderness present on Lt trochanteric area .Straight leg raise is -ve  bilaterally.  ; small ?thrombosed external hemorrhoid noted.  CNS: DTR's 2+, motor strength and sensation normal,     Ext:no LE edema, no calf tenderness, Peripheral pulses are palpable.       ASSESSMENT AND PLAN:    (K64.4) External hemorrhoids     Plan:explained about the condition, possible thrombosed, referred to  [facility]FERR[facility], advised SITZ bath, keep stools soft, continue stool softeners.      (M70.62) Trochanteric bursitis of left hip  Plan: explained about the condition, advised OTC Ibuprofen 400 mg po q8 hrs prn as directed.Intermittent rest, intermittent use of heat discussed - avoid sleeping on a heating pad. Avoid bicycling  Call or return to clinic prn if these symtoms worsen, fail to improve as anticipated, or if new symptoms develop. Will refer to ortho for possible cortisone inj if symptoms persists .

## 2018-10-22 NOTE — NURSING NOTE
/70  Pulse 89  Temp 98.1  F (36.7  C) (Oral)  Resp 12  Wt 166 lb 8 oz (75.5 kg)  SpO2 99%  BMI 25.32 kg/m2  Patient in for consult on hemmroids and Lt hip pain.  Laura Staples, CMA

## 2018-12-11 ENCOUNTER — TRANSFERRED RECORDS (OUTPATIENT)
Dept: HEALTH INFORMATION MANAGEMENT | Facility: CLINIC | Age: 53
End: 2018-12-11

## 2018-12-28 ENCOUNTER — TELEPHONE (OUTPATIENT)
Dept: INTERNAL MEDICINE | Facility: CLINIC | Age: 53
End: 2018-12-28

## 2018-12-28 DIAGNOSIS — Z12.11 ENCOUNTER FOR SCREENING COLONOSCOPY: Primary | ICD-10-CM

## 2018-12-28 NOTE — TELEPHONE ENCOUNTER
Panel Management Review      Patient has the following on her problem list:       IVD   ASA: MONITOR    Last LDL:    Lab Results   Component Value Date    CHOL 227 11/05/2014     Lab Results   Component Value Date    HDL 45 11/05/2014     Lab Results   Component Value Date     11/05/2014     Lab Results   Component Value Date    TRIG 182 11/05/2014        Lab Results   Component Value Date    CHOLHDLRATIO 5.0 11/05/2014        Is the patient on a Statin? NO   Is the patient on Aspirin? NO                    Last three blood pressure readings:  BP Readings from Last 3 Encounters:   10/22/18 104/70   01/11/18 118/72   12/05/17 122/76        Tobacco History:     History   Smoking Status     Never Smoker   Smokeless Tobacco     Never Used         Composite cancer screening  Chart review shows that this patient is due/due soon for the following Colonoscopy  Summary:    Patient is due/failing the following:   COLONOSCOPY    Action needed:   Patient needs office visit for colonoscopy.    Type of outreach:    Phone, spoke to patient.  Pt agrees.    Questions for provider review:    None                                                                                                                                    Laura Staples CMA       Chart routed to none .

## 2019-01-09 ENCOUNTER — TRANSFERRED RECORDS (OUTPATIENT)
Dept: HEALTH INFORMATION MANAGEMENT | Facility: CLINIC | Age: 54
End: 2019-01-09

## 2019-03-26 ASSESSMENT — ENCOUNTER SYMPTOMS
NERVOUS/ANXIOUS: 0
WEAKNESS: 0
EYE PAIN: 0
SHORTNESS OF BREATH: 1
DIARRHEA: 0
COUGH: 0
PARESTHESIAS: 0
FEVER: 0
CONSTIPATION: 0
NAUSEA: 0
HEMATOCHEZIA: 0
HEARTBURN: 1
ARTHRALGIAS: 0
DYSURIA: 0
JOINT SWELLING: 1
ABDOMINAL PAIN: 0
SORE THROAT: 0
HEADACHES: 0
CHILLS: 0
HEMATURIA: 0
DIZZINESS: 0
BREAST MASS: 0
PALPITATIONS: 0
FREQUENCY: 0
MYALGIAS: 0

## 2019-03-29 ENCOUNTER — OFFICE VISIT (OUTPATIENT)
Dept: INTERNAL MEDICINE | Facility: CLINIC | Age: 54
End: 2019-03-29
Payer: COMMERCIAL

## 2019-03-29 VITALS
TEMPERATURE: 98.4 F | HEART RATE: 110 BPM | RESPIRATION RATE: 13 BRPM | BODY MASS INDEX: 26.67 KG/M2 | WEIGHT: 176 LBS | HEIGHT: 68 IN | OXYGEN SATURATION: 96 % | SYSTOLIC BLOOD PRESSURE: 108 MMHG | DIASTOLIC BLOOD PRESSURE: 70 MMHG

## 2019-03-29 DIAGNOSIS — R06.02 SOB (SHORTNESS OF BREATH): ICD-10-CM

## 2019-03-29 DIAGNOSIS — E78.2 MIXED HYPERLIPIDEMIA: ICD-10-CM

## 2019-03-29 DIAGNOSIS — Z00.00 ROUTINE HISTORY AND PHYSICAL EXAMINATION OF ADULT: Primary | ICD-10-CM

## 2019-03-29 LAB
ALBUMIN SERPL-MCNC: 3.8 G/DL (ref 3.4–5)
ALP SERPL-CCNC: 85 U/L (ref 40–150)
ALT SERPL W P-5'-P-CCNC: 47 U/L (ref 0–50)
ANION GAP SERPL CALCULATED.3IONS-SCNC: 5 MMOL/L (ref 3–14)
AST SERPL W P-5'-P-CCNC: 35 U/L (ref 0–45)
BILIRUB SERPL-MCNC: 0.3 MG/DL (ref 0.2–1.3)
BUN SERPL-MCNC: 16 MG/DL (ref 7–30)
CALCIUM SERPL-MCNC: 9.2 MG/DL (ref 8.5–10.1)
CHLORIDE SERPL-SCNC: 108 MMOL/L (ref 94–109)
CHOLEST SERPL-MCNC: 296 MG/DL
CO2 SERPL-SCNC: 26 MMOL/L (ref 20–32)
CREAT SERPL-MCNC: 0.82 MG/DL (ref 0.52–1.04)
GFR SERPL CREATININE-BSD FRML MDRD: 82 ML/MIN/{1.73_M2}
GLUCOSE SERPL-MCNC: 99 MG/DL (ref 70–99)
HDLC SERPL-MCNC: 45 MG/DL
HGB BLD-MCNC: 13.3 G/DL (ref 11.7–15.7)
LDLC SERPL CALC-MCNC: 213 MG/DL
NONHDLC SERPL-MCNC: 251 MG/DL
POTASSIUM SERPL-SCNC: 4.5 MMOL/L (ref 3.4–5.3)
PROT SERPL-MCNC: 7.9 G/DL (ref 6.8–8.8)
SODIUM SERPL-SCNC: 139 MMOL/L (ref 133–144)
TRIGL SERPL-MCNC: 191 MG/DL
TSH SERPL DL<=0.005 MIU/L-ACNC: 1.31 MU/L (ref 0.4–4)

## 2019-03-29 PROCEDURE — 80061 LIPID PANEL: CPT | Performed by: INTERNAL MEDICINE

## 2019-03-29 PROCEDURE — 80053 COMPREHEN METABOLIC PANEL: CPT | Performed by: INTERNAL MEDICINE

## 2019-03-29 PROCEDURE — 85018 HEMOGLOBIN: CPT | Performed by: INTERNAL MEDICINE

## 2019-03-29 PROCEDURE — 84443 ASSAY THYROID STIM HORMONE: CPT | Performed by: INTERNAL MEDICINE

## 2019-03-29 PROCEDURE — 36415 COLL VENOUS BLD VENIPUNCTURE: CPT | Performed by: INTERNAL MEDICINE

## 2019-03-29 PROCEDURE — 99396 PREV VISIT EST AGE 40-64: CPT | Performed by: INTERNAL MEDICINE

## 2019-03-29 ASSESSMENT — ENCOUNTER SYMPTOMS
SHORTNESS OF BREATH: 1
HEADACHES: 0
HEMATOCHEZIA: 0
DYSURIA: 0
PALPITATIONS: 0
EYE PAIN: 0
ABDOMINAL PAIN: 0
CONSTIPATION: 0
HEARTBURN: 1
COUGH: 0
NAUSEA: 0
BREAST MASS: 0
WEAKNESS: 0
CHILLS: 0
MYALGIAS: 0
HEMATURIA: 0
DIZZINESS: 0
PARESTHESIAS: 0
DIARRHEA: 0
FEVER: 0
FREQUENCY: 0
SORE THROAT: 0
NERVOUS/ANXIOUS: 0
ARTHRALGIAS: 0

## 2019-03-29 ASSESSMENT — MIFFLIN-ST. JEOR: SCORE: 1453.89

## 2019-03-29 NOTE — PROGRESS NOTES
SUBJECTIVE:   CC: Shanna Sutton is an 53 year old woman who presents for preventive health visit.     Physical   Annual:     Getting at least 3 servings of Calcium per day:  Yes    Bi-annual eye exam:  Yes    Dental care twice a year:  NO    Sleep apnea or symptoms of sleep apnea:  Excessive snoring    Diet:  Regular (no restrictions)    Frequency of exercise:  1 day/week    Duration of exercise:  Less than 15 minutes    Taking medications regularly:  Yes    Medication side effects:  None    Additional concerns today:  No    PHQ-2 Total Score: 1       Pt c/o sob since 2 months,mainly with stairs, and also while walking short distances, no chest pain or palpitations, no orthopnea or PND  No h/o asthma or wheezing .  Sees OBGYN for pap       Today's PHQ-2 Score:   PHQ-2 ( 1999 [employer]) 3/26/2019   Q1: Little interest or pleasure in doing things 0   Q2: Feeling down, depressed or hopeless 1   PHQ-2 Score 1   Q1: Little interest or pleasure in doing things Not at all   Q2: Feeling down, depressed or hopeless Several days   PHQ-2 Score 1       Abuse: Current or Past(Physical, Sexual or Emotional)- No  Do you feel safe in your environment? Yes      Past Medical History:   Diagnosis Date     ASCUS on Pap smear 8/21/07, 11/20/09, 2/14/11    - HPV, - HPV, -HPV     Benign neoplasm of skin, site unspecified      Family history of colon cancer      Family history of colon cancer      History of colposcopy with cervical biopsy 3/1/11    WNL     Infectious mononucleosis      Mixed hyperlipidemia      Slow transit constipation     irritable bowel syndrome  abstracted [id number]       Past Surgical History:   Procedure Laterality Date     BREAST SURGERY  1/11/2017    prophylactic double mastectomy     Mastectomy Bilateral 01/11/2017    prophylactic double mastectomy: 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, 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 90 capsule 2       Family History   Problem Relation Age of Onset     Breast Cancer Mother         age 65     Cancer - colorectal Mother         age 73     Respiratory Father         COPD     Skin Cancer Father      Family History Negative Brother         6-healthy     Family History Negative Sister         5-healthy     Breast Cancer Sister         diagnosed at age 43     Thyroid Disease Sister         3 sisters     Breast Cancer Sister         diagnosed at age 58     Breast Cancer Sister      Thyroid Disease Sister        Social History     Tobacco Use     Smoking status: Never Smoker     Smokeless tobacco: Never Used   Substance Use Topics     Alcohol use: Yes     Alcohol/week: 0.0 oz     Comment: Rarely     Alcohol Use 3/26/2019   If you drink alcohol do you typically have greater than 3 drinks per day OR greater than 7 drinks per week? No       Reviewed orders with patient.  Reviewed health maintenance and updated orders accordingly - Yes      Pertinent mammograms are reviewed under the imaging tab.  History of abnormal Pap smear: YES - updated in Problem List and Health Maintenance accordingly  PAP / HPV 11/5/2014 4/18/2012 2/14/2011   PAP NIL NIL ASC-US(A)     Reviewed and updated as needed this visit by clinical staff         Reviewed and updated as needed this visit by Provider            Review of Systems   Constitutional: Negative for chills and fever.   HENT: Negative for congestion, ear pain, hearing loss and sore throat.    Eyes: Negative for pain and visual disturbance.   Respiratory: Positive for shortness of breath. Negative for cough.    Cardiovascular: Negative for chest pain, palpitations and peripheral edema.   Gastrointestinal: Positive for heartburn. Negative for abdominal pain, constipation, diarrhea, hematochezia and nausea.   Breasts:  Negative for tenderness, breast mass and discharge.   Genitourinary: Negative for dysuria, frequency, genital  "sores, hematuria, pelvic pain, urgency, vaginal bleeding and vaginal discharge.   Musculoskeletal: Negative for arthralgias, joint swelling and myalgias.   Skin: Negative for rash.   Neurological: Negative for dizziness, weakness, headaches and paresthesias.   Psychiatric/Behavioral: Negative for mood changes. The patient is not nervous/anxious.           OBJECTIVE:   /70   Pulse 110   Temp 98.4  F (36.9  C) (Oral)   Resp 13   Ht 1.715 m (5' 7.5\")   Wt 79.8 kg (176 lb)   SpO2 96%   BMI 27.16 kg/m    Physical Exam  GENERAL: healthy, alert and no distress  EYES: Eyes grossly normal to inspection, PERRL and conjunctivae and sclerae normal  HENT: ear canals and TM's normal, nose and mouth without ulcers or lesions  NECK: no adenopathy, no asymmetry, masses, or scars and thyroid normal to palpation  RESP: lungs clear to auscultation - no rales, rhonchi or wheezes  BREAST: bilateral mastectomy   CV: regular rate and rhythm, normal S1 S2, no S3 or S4, no murmur, click or rub, no peripheral edema and peripheral pulses strong  ABDOMEN: soft, nontender, no hepatosplenomegaly, no masses and bowel sounds normal  MS: no gross musculoskeletal defects noted, no edema  NEURO: Normal strength and tone, mentation intact and speech normal  PSYCH: mentation appears normal, affect normal/bright      ASSESSMENT/PLAN:      (Z00.00) Routine history and physical examination of adult  (primary encounter diagnosis)  Plan: Hemoglobin, Comprehensive metabolic panel,         Lipid panel reflex to direct LDL Fasting, TSH         with free T4 reflex,              (R06.02) SOB (shortness of breath)  Plan: Echocardiogram Complete.pt was told I will contact her after results and proceed accordingly.        (E78.2) Mixed hyperlipidemia  Plan: not on meds, check lipid panel.pt was told I will contact her after results and proceed accordingly.      COUNSELING:  Reviewed preventive health counseling, as reflected in patient instructions      " " Regular exercise       Healthy diet/nutrition    BP Readings from Last 1 Encounters:   10/22/18 104/70     Estimated body mass index is 27.16 kg/m  as calculated from the following:    Height as of this encounter: 1.715 m (5' 7.5\").    Weight as of this encounter: 79.8 kg (176 lb).      Weight management plan: Discussed healthy diet and exercise guidelines     reports that  has never smoked. she has never used smokeless tobacco.      Counseling Resources:  ATP IV Guidelines  Pooled Cohorts Equation Calculator  Breast Cancer Risk Calculator  FRAX Risk Assessment  ICSI Preventive Guidelines  Dietary Guidelines for Americans, 2010  USDA's MyPlate  ASA Prophylaxis  Lung CA Screening    Lexi Forrester MD  [facility]  "

## 2019-03-29 NOTE — NURSING NOTE
"Vital signs:  Temp: 98.4  F (36.9  C) Temp src: Oral BP: 108/70 Pulse: 110   Resp: 13 SpO2: 96 %     Height: 171.5 cm (5' 7.5\") Weight: 79.8 kg (176 lb)  Estimated body mass index is 27.16 kg/m  as calculated from the following:    Height as of this encounter: 1.715 m (5' 7.5\").    Weight as of this encounter: 79.8 kg (176 lb).          "

## 2019-04-03 ASSESSMENT — ENCOUNTER SYMPTOMS: JOINT SWELLING: 0

## 2019-04-11 RX ORDER — ATORVASTATIN CALCIUM 20 MG/1
20 TABLET, FILM COATED ORAL DAILY
Qty: 90 TABLET | Refills: 3 | Status: SHIPPED | OUTPATIENT
Start: 2019-04-11 | End: 2020-05-29

## 2019-04-24 ENCOUNTER — HOSPITAL ENCOUNTER (OUTPATIENT)
Facility: CLINIC | Age: 54
Discharge: HOME | End: 2019-04-24
Attending: COLON & RECTAL SURGERY | Admitting: COLON & RECTAL SURGERY
Payer: COMMERCIAL

## 2019-04-24 VITALS
HEART RATE: 81 BPM | DIASTOLIC BLOOD PRESSURE: 92 MMHG | RESPIRATION RATE: 16 BRPM | OXYGEN SATURATION: 95 % | SYSTOLIC BLOOD PRESSURE: 116 MMHG

## 2019-04-24 LAB — COLONOSCOPY: NORMAL

## 2019-04-24 PROCEDURE — 25000128 H RX IP 250 OP 636: Performed by: COLON & RECTAL SURGERY

## 2019-04-24 PROCEDURE — 88305 TISSUE EXAM BY PATHOLOGIST: CPT | Performed by: COLON & RECTAL SURGERY

## 2019-04-24 PROCEDURE — G0500 MOD SEDAT ENDO SERVICE >5YRS: HCPCS | Performed by: COLON & RECTAL SURGERY

## 2019-04-24 PROCEDURE — 45385 COLONOSCOPY W/LESION REMOVAL: CPT | Mod: PT | Performed by: COLON & RECTAL SURGERY

## 2019-04-24 RX ORDER — LIDOCAINE 40 MG/G
CREAM TOPICAL
Status: DISCONTINUED | OUTPATIENT
Start: 2019-04-24 | End: 2019-04-24 | Stop reason: HOSPADM

## 2019-04-24 RX ORDER — PROCHLORPERAZINE MALEATE 10 MG
10 TABLET ORAL EVERY 6 HOURS PRN
Status: DISCONTINUED | OUTPATIENT
Start: 2019-04-24 | End: 2019-04-24 | Stop reason: HOSPADM

## 2019-04-24 RX ORDER — ONDANSETRON 4 MG/1
4 TABLET, ORALLY DISINTEGRATING ORAL EVERY 6 HOURS PRN
Status: DISCONTINUED | OUTPATIENT
Start: 2019-04-24 | End: 2019-04-24 | Stop reason: HOSPADM

## 2019-04-24 RX ORDER — DIPHENHYDRAMINE HYDROCHLORIDE 50 MG/ML
INJECTION INTRAMUSCULAR; INTRAVENOUS PRN
Status: DISCONTINUED | OUTPATIENT
Start: 2019-04-24 | End: 2019-04-24 | Stop reason: HOSPADM

## 2019-04-24 RX ORDER — FENTANYL CITRATE 50 UG/ML
INJECTION, SOLUTION INTRAMUSCULAR; INTRAVENOUS PRN
Status: DISCONTINUED | OUTPATIENT
Start: 2019-04-24 | End: 2019-04-24 | Stop reason: HOSPADM

## 2019-04-24 RX ORDER — ONDANSETRON 2 MG/ML
4 INJECTION INTRAMUSCULAR; INTRAVENOUS
Status: DISCONTINUED | OUTPATIENT
Start: 2019-04-24 | End: 2019-04-24 | Stop reason: HOSPADM

## 2019-04-24 RX ORDER — NALOXONE HYDROCHLORIDE 0.4 MG/ML
.1-.4 INJECTION, SOLUTION INTRAMUSCULAR; INTRAVENOUS; SUBCUTANEOUS
Status: DISCONTINUED | OUTPATIENT
Start: 2019-04-24 | End: 2019-04-24 | Stop reason: HOSPADM

## 2019-04-24 RX ORDER — ONDANSETRON 2 MG/ML
4 INJECTION INTRAMUSCULAR; INTRAVENOUS EVERY 6 HOURS PRN
Status: DISCONTINUED | OUTPATIENT
Start: 2019-04-24 | End: 2019-04-24 | Stop reason: HOSPADM

## 2019-04-24 RX ORDER — FLUMAZENIL 0.1 MG/ML
0.2 INJECTION, SOLUTION INTRAVENOUS
Status: DISCONTINUED | OUTPATIENT
Start: 2019-04-24 | End: 2019-04-24 | Stop reason: HOSPADM

## 2019-04-24 NOTE — DISCHARGE INSTRUCTIONS
Understanding Colon and Rectal Polyps     The colon has a smooth lining composed of millions of cells.     The colon (also called the large intestine) is a muscular tube that forms the last part of the digestive tract. It absorbs water and stores food waste. The colon is about 4 to 6 feet long. The rectum is the last 6 inches of the colon. The colon and rectum have a smooth lining composed of millions of cells. Changes in these cells can lead to growths in the colon that can become cancerous and should be removed.     When the Colon Lining Changes  Changes that occur in the cells that line the colon or rectum can lead to growths called polyps. Over a period of years, polyps can turn cancerous. Removing polyps early may prevent cancer from ever forming.      Polyps  Polyps are fleshy clumps of tissue that form on the lining of the colon or rectum. Small polyps are usually benign (not cancerous). However, over time, cells in a polyp can change and become cancerous. The larger a polyp grows, the more likely this is to happen. Also, certain types of polyps known as adenomatous polyps are considered premalignant. This means that they will almost always become cancerous if they re not removed.          Cancer  Almost all colorectal cancers start when polyp cells begin growing abnormally. As a cancerous tumor grows, it may involve more and more of the colon or rectum. In time, cancer can also grow beyond the colon or rectum and spread to nearby organs or to glands called lymph nodes. The cells can also travel to other parts of the body. This is known as metastasis. The earlier a cancerous tumor is removed, the better the chance of preventing its spread.        [id number] [facility], [address] [place] [address], [place] [address]. All rights reserved. This information is not intended as a substitute for professional medical care. Always follow your healthcare professional's instructions.    Thank you for choosing [facility]. You are scheduled for the following service(s).       Date: ***      Procedure:    Colonoscopy   Doctor:       ***     Arrival Time:   ***  *check in at Emergency/Endoscopy desk*  Procedure Time:   ***    Location:   [facility]      Endoscopy Department, First Floor (Enter through ER Doors) *       [address] [place], [place] [address]    [phone] or [phone] () to reschedule       MIRALAX/GATORADE DOUBLE PREP    Purchase the following supplies at your local pharmacy:    2 (two)- Bisacodyl tablets   (Dulcolax  laxative NOT Dulcolax  stool softener) each tablet contains 5 mg of bisacodyl  2 (two) - 8.3 ounce bottle of Polyethylene Glycol (PEG) 3350 Powder   (MiraLAX, SmoothLAX, ClearLAX or generic equivalent)  128 oz. Gatorade  (No red colored flavors)  Regular Gatorade , Gatorade G2 , Powerade , Powerade Zero  or Pedialyte  are acceptable. Red flavors are not allowed; all other colors (yellow, green, orange, purple, blue) are okay. It is also okay to buy two 2.12 oz packets of powdered Gatorade that can be mixed with water to a total volume of 64 oz of liquid.  1 - 10 oz. bottle Magnesium Citrate (No red colored flavors)  It is also okay for you to use a 0.5 ounce package of powdered magnesium citrate (17 grams) mixed with 10 ounces of water.        PREPARATION FOR COLONOSCOPY    Transportation:  You must arrange for a ride for the day of your procedure with a responsible adult. A taxi ride is not an option unless you are accompanied by a responsible adult. If you fail to arrange transportation with a responsible adult, your procedure will be cancelled and rescheduled.      7 days before:    Discontinue fiber supplements and medications containing iron. This includes multivitamins with iron, Metamucil and Fibercon.     3 days before:     Begin a Low-Fiber Diet. A low fiber diet helps make the cleanout more effective.     Examples of a low  fiber diet include (but are not limited to): White bread, white rice, pasta, crackers, fish, chicken, eggs, ground beef, creamy peanut butter, cooked/steamed/boiled vegetables, canned fruit, bananas, melons, milk, plain yogurt cheese, salad dressing and other condiments.     The following are not allowed on a low fiber diet: Seeds, nuts, popcorn, bran, whole wheat, corn, quinoa, raw fruits and vegetables, berries and dried fruit, beans and lentils.    For additional details on following a low fiber diet, please see attached information sheet    2 days before:    Stop eating solid foods in the morning.    Begin Clear Liquid Diet. (Clear liquids include things you can see through).     Examples of a clear liquid diet include: Water, tea , coffee ,no milk or non-dairy creamer), clear broth or bouillon, Gatorade, Pedialyte or Powerade, carbonated and non-carbonated soft drinks(Sprite , 7-Up , Gingerale), strained fruit juices without pulp (apple, white grape, white cranberry), Jello-O  and popsicles     The following are not allowed on a clear liquid diet: Red liquids, alcoholic beverages, coffee, dairy products, protein shakes, cream broths, juice with pulp and chewing tobacco.    Between 4-6pm: Drink Miralax - Gatorade preparation, mix 1 (one) bottle of Miralax with 64 oz. of Gatorade in a large pitcher.  Drink 1 (one) - 8 oz. glass every 15 minutes thereafter until the mixture is gone.      1 day before:    Continue clear liquid diet    At noon: Take 2 Bisacodyl (Dulcolax) tablets     Between 4-6pm: Drink Miralax - Gatorade preparation Between 4-6pm: Drink Miralax - Gatorade preparation, mix 1 (one) bottle of Miralax with 64 oz. of Gatorade in a large pitcher.    Drink 1 (one) - 8 oz. glass every 15 minutes thereafter until the mixture is gone.    Colon Cleansing Tips: Drink adequate amounts of fluid before and after your colon cleansing to prevent dehydration. Stay near a toilet because you will have diarrhea. Even  if you are sitting on the toilet, continue to drink the cleansing solution every 15 minutes. If you feel nauseous or vomit, rinse your mouth with water, take a 15 to 30-minute break and then continue drinking the solution. You will be uncomfortable until the stool has flushed from your colon (in about 2-4 hours). You may feel chilled.    Day of your procedure:  You may take all of your morning medications including blood pressure medications, blood thinners (if you have not been instructed to stop these by our office), methadone, anti-seizure medications with sips of water 3 hours prior to your procedure or earlier. Do not take insulin or vitamins prior to your procedure.  Continue the Clear Liquid Diet. Avoid red liquids, alcoholic beverages, coffee, dairy products, protein shakes, and chewing tobacco.      4 hours prior: Drink 10 oz magnesium citrate     3 hours prior:     STOP consuming all liquids     Do not take anything by mouth during this time.     Allow extra time to travel to your procedure as you may need to stop and use a restroom along the way.    You are ready for the procedure, if you followed all instructions and your stool is no longer formed, but clear or yellow liquid. If you are unsure whether your colon is clean, please call our office at [phone] before you leave for your appointment.      Canceling or Rescheduling your appointment, please call [phone] as soon as possible.        Bring the following to your procedure:    Insurance Card / Photo ID     List of Current Medications including over-the-counter medications and supplements     Bring your rescue inhaler if you currently use one to control asthma     DIRECTIONS    From the [place] ([place], [place], [place])  Take [address] south, exit to [address] . Get into the left hand dipika, turn left (east), go one-half mile to [address]. Turn left (north) on [address]. Go north to first stoplight, take a right on the  newly constructed road, [address] and follow it to the Emergency Entrance  From the south ([facility])  Take 35 north to the east split, [address], and exit to [place] [address]. Turn left (west) on [address] [address] to [address]. Turn right (north) on [address]. Go north to first stoplight, take a right on the newly constructed road, [address] and follow it [facility]   From the east via [address] ([facility])  Take [address] south to [place] [address] exit. Turn right on [address] . Go west to [address]. Turn right (north) on [address], go to the first stoplight, take a right and follow the newly constructed road, [address], to the Emergency Entrance   From the east via [address] ([facility])  Take [address] west to [address]. Turn left (south) on [address] to [address]. Turn left (east) on [address] and follow the newly constructed road [facility]   From the west via [address] ([place])  Take [address] east to [address]. Turn right (south) on [address] to [address].  Turn left (east) on [address] and follow the newly constructed road Emergency Entrance      PRE-PROCEDURE CHECKLIST    If you have diabetes, ask your regular doctor for diet and medication restrictions.  If you take a medication to thin your blood (such as Coumadin or Lovenox) and have not already discussed this please call your primary doctor to advice on holding this medication  If you take aspirin or Plavix,  you may continue to do so.  If you are or may be pregnant, please discuss the risks and benefits of this procedure with your doctor.        You must arrange for a ride for the day of your exam. If you fail to arrange transportation with a responsible adult, your procedure will need to be cancelled and rescheduled. Taxi ,Bus and Medical transport are not acceptable unless you have a responsible adult that you know & trust  with you.  Please arrange for this  to be able to pick you up in our department, approximately one hour after your scheduled procedure, if they are not able to stay with you.  *If you must cancel or reschedule your appointment, please call [facility] at [phone].*      What happens during a colonoscopy?    Plan to spend up to two hours, starting at registration time, at the endoscopy center the day of your procedure. The exam itself takes about 15 minutes to complete, and recovery 30 minutes.     Before the exam:    You will change into a gown.    Your medical history will be reviewed with you and you will be given a consent form to sign.     A nurse will insert an intravenous (IV) line into your hand or arm.    During the exam:     Medicine will be given through the IV line to help you relax and feel drowsy.     Your heart rate and oxygen levels will be monitored. If your blood pressure is low, you may be given fluids through the IV line.     The doctor will insert a flexible hollow tube, called a colonoscope, into your rectum.   o The scope will be advanced slowly through the large intestine (colon).    You may have a feeling of pressure or fullness.     If an abnormal tissue, or a polyp are found, the doctor may remove it through the endoscope for closer examination, or biopsy. Tissue removal is painless.    What happens after the exam?        Your doctor will talk with you about the initial results of your exam.    The doctor will prepare a full report for the physician who referred you for the colonoscopy.    You may feel bloated after the procedure. This is normal.     Medication given during the exam will prohibit you from driving for the rest of the day.     Following the exam, you may resume your normal diet. Your first meal should be light, no greasy foods. Avoid alcohol until the next day.     You may resume your regular activities the day after the procedure.     A nurse will provide  you with complete discharge instructions before you leave the endoscopy center. Be sure to ask the nurse for specific instructions if you take blood thinners such as aspirin, Coumadin or Plavix.     Any tissue samples removed during the exam will be sent to a lab for evaluation. It may take 5-7 working days for you to be notified of the results.       Low-Fiber Diet    A low-fiber diet limits the amount of food waste that has to move through the large intestine.    Foods Recommended Foods to Avoid   Breads, Cereal, Rice and Pasta:   White bread, rolls, biscuits, and croissant, bárbara toast   Waffles, Chinese toast, and pancakes   White rice, noodles, pasta, macaroni and peeled cooked potatoes   Plain crackers, Saltines   Cooked cereals: farina, Cream of Rice   Cold cereals: Puffed Rice, Rice Krispies, Corn Flakes, and Special K Breads, Cereal, Rice and Pasta:   Breads or rolls with nuts, seeds or fruit   Whole wheat, pumpernickel, rye breads and cornbread   Potatoes with skin, brown or wild rice, and kasha (buckwheat)     Vegetables:    Tender cooked and canned vegetables without seeds: carrots, asparagus tips, green or wax beans, pumpkin, spinach, lima beans   Vegetables:   Raw or steamed vegetables   Vegetables with seeds   Sauerkraut   Winter squash, peas, broccoli, [place] sprouts, cabbage, onions, cauliflower, baked beans, peas and corn     Fruits:   Strained fruit juice   canned fruit, except pineapple   ripe bananas   melons Fruits:   prunes and prune juice   raw or dried fruit   all berries, figs, dates and raisins     Milk/Dairy:   milk, plain or flavored   yogurt, custard, and ice cream   cheese and cottage cheese Milk/Dairy:   Yogurt with nuts or seeds   Meat and other proteins:   ground, wellcooked tender beef, lamb, ham, veal, pork, fish, poultry, and organ meats   eggs   peanut butter without nuts  Fats, Snack, Sweets, Condiments, and Beverages:   Margarine, butter, oils, mayonnaise, sour cream, and  salad dressing   Plain graviesbouillon, broth, and soups made with allowed vegetables   Coffee, tea, and carbonated drinks   Plain cakes and cookies   Gelatin, plain puddings, custard, ice cream, sherbet, Popsicles   Hard candy or pretzels   Ketchup, mustard   Sugar, clear jelly, honey, and syrup   Spices, cooked herbs,    Meat and other proteins:   tough, fibrous meats with gristle   dry beans, peas, and lentils   peanut butter with nuts   Tofu  Fats, Snack, Sweets, Condiments, and Beverages:   Nuts, seeds, and coconut   Jam, marmalade, and preserves   Pickles, olives, relish, and horseradish   All desserts containing nuts, seeds, dried fruit, coconut, or made from whole grains or bran   Candy made with nuts or seeds   popcorn

## 2019-04-24 NOTE — H&P
Pre-Endoscopy History and Physical   Shanna Sutton MRN# [id number]   YOB: 1965 Age: 53 year old   Date of Procedure: 4/24/2019   Primary care provider: Lexi Forrester   Type of Endoscopy: colonoscopy   Reason for Procedure: screening   Type of Anesthesia Anticipated: Moderate Sedation   HPI:   Sahnna is a 53 year old female for screening colonoscopy.  She has never had a colonoscopy before.  She denies abdominal pain, nausea/vomiting, changes in bowel habits or unintentional weight loss.  She has a history of a chronic anal fissure so she occasionally sees [facility].  Her mother had colon cancer at the age of 72.    Allergies   Allergen Reactions     Amoxicillin Hives      Prior to Admission Medications   Prescriptions Last Dose Informant Patient Reported? Taking?   atorvastatin (LIPITOR) 20 MG tablet   No No   Sig: Take 1 tablet (20 mg) by mouth daily   omeprazole (PRILOSEC) 40 MG capsule   No No   Sig: TAKE 1 CAPSULE (40 MG) BY MOUTH DAILY TAKE 30-60 MINUTES BEFORE A MEAL.      Facility-Administered Medications: None      Patient Active Problem List   Diagnosis     Mixed hyperlipidemia     Benign neoplasm of skin     HYPERLIPIDEMIA LDL GOAL <130     Papanicolaou smear of cervix with atypical squamous cells cannot exclude high grade squamous intraepithelial lesion (ASC-H)     Family history of colon cancer     Gastroesophageal reflux disease, esophagitis presence not specified     Mirena IUD insertion 12/5/2017 - remove on or before 12/5/2022      Past Medical History:   Diagnosis Date     ASCUS on Pap smear 8/21/07, 11/20/09, 2/14/11    - HPV, - HPV, -HPV     Benign neoplasm of skin, site unspecified      Family history of colon cancer      Family history of colon cancer      History of colposcopy with cervical biopsy 3/1/11    WNL     Infectious mononucleosis      Mixed hyperlipidemia      Slow transit constipation     irritable bowel syndrome  abstracted [id number]      Past Surgical  "History:   Procedure Laterality Date     BREAST SURGERY  1/11/2017    prophylactic double mastectomy     Mastectomy Bilateral 01/11/2017    prophylactic double mastectomy: 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, 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      Social History     Tobacco Use     Smoking status: Never Smoker     Smokeless tobacco: Never Used   Substance Use Topics     Alcohol use: Yes     Alcohol/week: 0.0 oz     Comment: Rarely      Family History   Problem Relation Age of Onset     Breast Cancer Mother         age 65     Cancer - colorectal Mother         age 73     Respiratory Father         COPD     Skin Cancer Father      Family History Negative Brother         6-healthy     Family History Negative Sister         5-healthy     Breast Cancer Sister         diagnosed at age 43     Thyroid Disease Sister         3 sisters     Breast Cancer Sister         diagnosed at age 58     Breast Cancer Sister      Thyroid Disease Sister       PHYSICAL EXAM:   There were no vitals taken for this visit. Estimated body mass index is 27.16 kg/m  as calculated from the following:    Height as of 3/29/19: 1.715 m (5' 7.5\").    Weight as of 3/29/19: 79.8 kg (176 lb).   RESP: lungs clear to auscultation - no rales, rhonchi or wheezes   CV: regular rates and rhythm   ASA Class 2 - Mild systemic disease    Assessment: 54 y/o woman for screening colonoscopy    Plan: Colonoscopy with moderate sedation.  Risks of the procedure were discussed including, but not limited to, bleeding, perforation and missed lesions.  Patient understands and is willing to proceed.    Jac Fernando MD ....................  4/24/2019   2:14 PM  Colon and Rectal Surgery Staff  [phone]    "

## 2019-04-25 LAB — COPATH REPORT: NORMAL

## 2019-06-27 DIAGNOSIS — K21.9 GASTROESOPHAGEAL REFLUX DISEASE, ESOPHAGITIS PRESENCE NOT SPECIFIED: ICD-10-CM

## 2019-06-27 RX ORDER — OMEPRAZOLE 40 MG/1
CAPSULE, DELAYED RELEASE ORAL
Qty: 90 CAPSULE | Refills: 2 | Status: SHIPPED | OUTPATIENT
Start: 2019-06-27 | End: 2020-06-30

## 2019-06-27 NOTE — TELEPHONE ENCOUNTER
"Requested Prescriptions   Pending Prescriptions Disp Refills     omeprazole (PRILOSEC) 40 MG DR capsule [Pharmacy Med Name: OMEPRAZOLE DR 40 MG CAPSULE] 90 capsule 2     Sig: TAKE 1 CAPSULE (40 MG) BY MOUTH DAILY TAKE 30-60 MINUTES BEFORE A MEAL.   Last Written Prescription Date:  12/08/2017  Last Fill Quantity: 90,  # refills: 2   Last office visit: 3/29/2019 with prescribing provider:     Future Office Visit:      PPI Protocol Passed - 6/27/2019  7:00 AM        Passed - Not on Clopidogrel (unless Pantoprazole ordered)        Passed - No diagnosis of osteoporosis on record        Passed - Recent (12 mo) or future (30 days) visit within the authorizing provider's specialty     Patient had office visit in the last 12 months or has a visit in the next 30 days with authorizing provider or within the authorizing provider's specialty.  See \"Patient Info\" tab in inbasket, or \"Choose Columns\" in Meds & Orders section of the refill encounter.              Passed - Medication is active on med list        Passed - Patient is age 18 or older        Passed - No active pregnacy on record        Passed - No positive pregnancy test in past 12 months        "

## 2019-09-29 ENCOUNTER — HEALTH MAINTENANCE LETTER (OUTPATIENT)
Age: 54
End: 2019-09-29

## 2019-10-06 ENCOUNTER — HOSPITAL ENCOUNTER (EMERGENCY)
Facility: CLINIC | Age: 54
Discharge: HOME | End: 2019-10-06
Attending: EMERGENCY MEDICINE | Admitting: EMERGENCY MEDICINE
Payer: COMMERCIAL

## 2019-10-06 VITALS
TEMPERATURE: 98 F | SYSTOLIC BLOOD PRESSURE: 147 MMHG | OXYGEN SATURATION: 96 % | RESPIRATION RATE: 18 BRPM | DIASTOLIC BLOOD PRESSURE: 92 MMHG | HEART RATE: 87 BPM

## 2019-10-06 DIAGNOSIS — S61.219A FINGER LACERATION, INITIAL ENCOUNTER: ICD-10-CM

## 2019-10-06 PROCEDURE — 99283 EMERGENCY DEPT VISIT LOW MDM: CPT

## 2019-10-06 PROCEDURE — 12001 RPR S/N/AX/GEN/TRNK 2.5CM/<: CPT

## 2019-10-06 ASSESSMENT — ENCOUNTER SYMPTOMS: WOUND: 1

## 2019-10-06 NOTE — ED AVS SNAPSHOT
[facility] Emergency Department  [address]  [facility] [address]  Phone:  [phone]  Fax:  [phone]                                    Shanna Sutton   MRN: [id number]    Department:  [facility] Emergency Department   Date of Visit:  10/6/2019           After Visit Summary Signature Page    I have received my discharge instructions, and my questions have been answered. I have discussed any challenges I see with this plan with the nurse or doctor.    ..........................................................................................................................................  Patient/Patient Representative Signature      ..........................................................................................................................................  Patient Representative Print Name and Relationship to Patient    ..................................................               ................................................  Date                                   Time    ..........................................................................................................................................  Reviewed by Signature/Title    ...................................................              ..............................................  Date                                               Time          22EPIC Rev 08/18

## 2019-10-07 NOTE — ED TRIAGE NOTES
Pt reports she cut her left middle finger on a broken mirror at about 1 pm today. Bleeding controlled with pressure, pt states if she takes the bandage off it starts bleeding again. Unsure of last tetanus, within the last 10 years.

## 2019-10-07 NOTE — ED PROVIDER NOTES
History     Chief Complaint:  Laceration    HPI   Shanna Sutton is a right handed 54 year old female who presents with a laceration. Last tetanus was in 2015. The patient reports that she was trying to hang a mirror up on the wall earlier tonight when the mirror slipped from her  and fell, nicking her left middle finger. She applied gauze and pressure, but when she removed it to check, it bled again and bandaged it again before deciding to come in for further treatment. She does not endorse any pain without pressure from the Band-Aid.     Allergies:  Amoxicillin  Oxycodone-Acetaminophen     Medications:    Atorvastatin  Omeprazole  Methocarbamol    Past Medical History:    ASCUS on pap smear  Benign neoplasm of skin  Infectious mononucleosis  Mixed hyperlipidemia  Slow transit constipation    Past Surgical History:    Mastectomy    Family History:    Breast cancer  Colorectal cancer  COPD  Skin cancer  Thyroid disease    Social History:  Smoking status: Never smoker  Alcohol use: Yes  Drug use: No  PCP: Lexi Forrester  Presents to the ED with herself  Marital Status:        Review of Systems   Skin: Positive for wound.   All other systems reviewed and are negative.    Physical Exam     Patient Vitals for the past 24 hrs:   BP Temp Temp src Pulse Resp SpO2   10/06/19 2121 (!) 147/92 98  F (36.7  C) Oral 87 18 96 %       Physical Exam  General: Alert, cooperative, minimal distress.  Cardiovascular: normal rate, normal distal perfusion left middle finger; persistent, non pulsatile bleeding from wound.  Musculoskeletal: no finger deformity.   Neurologic:  Intact distal sensation and flexor function.  Skin: left middle finger notable for 1 cm partial thickness laceration to palmar aspect of distal phalynx.    Psychiatric: normal affect.      Emergency Department Course     Procedures:    Laceration Repair        LACERATION:  A simple clean 1 cm laceration.      LOCATION:  Left middle  finger      FUNCTION:  Distally sensation, circulation, motor and tendon function are intact.      ANESTHESIA:  Digital block using 2.5 mLs 1% lido      PREPARATION:  Scrubbing with Shur Clens      DEBRIDEMENT:  no debridement      CLOSURE:  Wound was closed with One Layer.  Skin closed with 3 x 5.0 Ethylon using interrupted sutures.    Emergency Department Course:  Past medical records, nursing notes, and vitals reviewed.  : I performed an exam of the patient and obtained history, as documented above.    : I performed a laceration repair on the patient, procedure note above.    Patient discharged home with instructions regarding supportive care, medications, and reasons to return. The importance of close follow-up was reviewed.     Impression & Plan    Medical Decision Makin-year-old right-hand-dominant female sustained a simple laceration to her left middle finger.  This was repaired as detailed above and she is discharged with routine wound care instructions.    Diagnosis:    ICD-10-CM   1. Finger laceration, initial encounter S61.219A       Disposition: Discharged to home    IVi, am serving as a scribe at 9:25 PM on 10/6/2019 to document services personally performed by Bimal Ramirez MD based on my observations and the provider's statements to me.     Vi Solorzano  10/6/2019   [facility] EMERGENCY DEPARTMENT       Bimal Ramirez MD  10/07/19 1848

## 2019-12-16 ENCOUNTER — OFFICE VISIT (OUTPATIENT)
Dept: OBGYN | Facility: CLINIC | Age: 54
End: 2019-12-16
Payer: COMMERCIAL

## 2019-12-16 VITALS
HEIGHT: 68 IN | DIASTOLIC BLOOD PRESSURE: 70 MMHG | BODY MASS INDEX: 26.98 KG/M2 | SYSTOLIC BLOOD PRESSURE: 112 MMHG | WEIGHT: 178 LBS

## 2019-12-16 DIAGNOSIS — N93.8 DUB (DYSFUNCTIONAL UTERINE BLEEDING): ICD-10-CM

## 2019-12-16 DIAGNOSIS — L90.0 LICHEN SCLEROSUS: ICD-10-CM

## 2019-12-16 DIAGNOSIS — T83.32XA INTRAUTERINE CONTRACEPTIVE DEVICE THREADS LOST, INITIAL ENCOUNTER: ICD-10-CM

## 2019-12-16 DIAGNOSIS — Z12.4 SCREENING FOR CERVICAL CANCER: Primary | ICD-10-CM

## 2019-12-16 PROCEDURE — 99214 OFFICE O/P EST MOD 30 MIN: CPT | Performed by: OBSTETRICS & GYNECOLOGY

## 2019-12-16 PROCEDURE — 87624 HPV HI-RISK TYP POOLED RSLT: CPT | Performed by: OBSTETRICS & GYNECOLOGY

## 2019-12-16 PROCEDURE — G0145 SCR C/V CYTO,THINLAYER,RESCR: HCPCS | Performed by: OBSTETRICS & GYNECOLOGY

## 2019-12-16 RX ORDER — CLOBETASOL PROPIONATE 0.5 MG/G
OINTMENT TOPICAL 2 TIMES DAILY
Qty: 30 G | Refills: 3 | Status: SHIPPED | OUTPATIENT
Start: 2019-12-16 | End: 2022-06-23

## 2019-12-16 ASSESSMENT — MIFFLIN-ST. JEOR: SCORE: 1457.96

## 2019-12-16 NOTE — PROGRESS NOTES
"  SUBJECTIVE:                                                   CC:  Patient presents with:  Gyn Exam      HPI:  Shanna Sutton is a 54 year old  who presents for a pap smear.  She had a NILM, HPV neg pap in , due for co testing today.     Also notes some pain on the posterior introitus with intercourse  Has the IUD for menstrual suppression, in place x2.5 years, but bleeding has been very heavy to the point of not being able to leave the house.  Most recently she went 3 months without a cycle and then had super heavy bleeding, had to stay home for 2 days. +clots.    Vasectomy for birth control.   x2.   Had neg EMB prior to placement of the IUD.  She has a hard time saying if the IUD initially helped symptoms or not, her periods never fully went away.   Has discussed ablation before with Dr Yarbrough.  H/o PLAB2 which puts her at a higher risk of breast cancer, s/p mastectomy, at the time she saw GC they didn't believe it placed her at a higher risk of ovarian cancer but she hasn't seen them in awhile.     ROS: 10 point ROS negative other than as listed above in HPI.    Gyn History:  Patient's last menstrual period was 2019 (exact date).     Patient is sexually active.  Using vasectomy for contraception.      Last 3 Pap and HPV Results:   PAP / HPV 2014   PAP NIL NIL ASC-US(A)       PMH, PSH, Soc Hx, Fam Hx, Meds, and allergies reviewed in Epic.  +fam h/o gyn cancer (see epic tab)    OBJECTIVE:     /70 (BP Location: Right arm, Patient Position: Chair, Cuff Size: Adult Large)   Ht 1.715 m (5' 7.5\")   Wt 80.7 kg (178 lb)   LMP 2019 (Exact Date)   Breastfeeding No   BMI 27.47 kg/m      Gen: Healthy appearing female, no acute distress, comfortable, appears stated age  HENT: No scleral injection or icterus  CV: Regular rate  Resp: Normal work of breathing, no cough  GI: Abdomen soft, non-tender. No masses, organomegaly  Skin: No suspicious lesions or " rashes  Psychiatric: mentation appears normal and affect bright  : labia minora slightly sclerotic and appearance of small fissure on posterior peritoneum possibly c/w LS.  normal hair distribution.   SSE: Speculum exam reveals vaginal epithelium well rugated with normal physiologic discharge. Cervix appears smooth, pink, with no visible lesions.  IUD strings not visualized.   Bimanual exam reveals normal size uterus, non-tender, and mobile, good descensus. No adnexal masses or tenderness. No cervical motion tenderness.     ASSESSMENT/PLAN:                                                      1. Screening for cervical cancer  S/p pap smear today  - Pap imaged thin layer screen with HPV - recommended age 30 - 65  - HPV High Risk Types DNA Cervical    2. Lichen sclerosus  Discussed that we can attempt a trial of clobetasol and see if this improves her dyspareunia as her skin exam is c/w LS.  If no improvement, consider biopsy.   - clobetasol (TEMOVATE) 0.05 % external ointment; Apply topically 2 times daily For 2 weeks, then twice per week for symptoms.  Dispense: 30 g; Refill: 3    3. Intrauterine contraceptive device threads lost, initial encounter  Especially since her bleeding is abnormally heavy, will confirm location of IUD.  Vasectomy for contraception, no concern for pregnancy.   - US Pelvic Complete with Transvaginal; Future    4. AUB  Discussed etiologies for abnormal uterine bleeding; including polyps, fibroids, adenomyosis, endometrial cancer or pre-cancer, hormonal imbalances related to menopause, coagulopathy, or medication side effect. She is s/p EMB prior to IUD placement, which if the IUD is in place would be protective of endometrial precancer and she would not require a repeat prior to ablation.   Discussed methods of treatment of heavy uterine bleeding including birth control pills, Mirena IUD, nexplanon, endometrial ablation, or hysterectomy.  We discussed that if the IUD is in place, she can  see if her cycles improve spontaneously getting closer to menopause.  Alternatively, she would be a good candidate for ablation, and we also discussed hysterectomy, specifically TVH BS versus TLH BSO (if she sees GC and they recommend removal of ovaries for cancer prevention).  She would be an excellent candidate for TVH but if she desires oophorectomy for cancer ppx would consider TLH.  She will call back if she desires to schedule over the next couple of months.      Catalina Nation MD, MPH  Obstetrics and Gynecology

## 2019-12-16 NOTE — NURSING NOTE
"Chief Complaint   Patient presents with     Gyn Exam       Initial /70 (BP Location: Right arm, Patient Position: Chair, Cuff Size: Adult Large)   Ht 1.715 m (5' 7.5\")   Wt 80.7 kg (178 lb)   LMP 2019 (Exact Date)   Breastfeeding No   BMI 27.47 kg/m   Estimated body mass index is 27.47 kg/m  as calculated from the following:    Height as of this encounter: 1.715 m (5' 7.5\").    Weight as of this encounter: 80.7 kg (178 lb).  BP completed using cuff size: regular    Questioned patient about current smoking habits.  Pt. has never smoked.          The following HM Due: pap smear    Teresa Clayton CMA           "

## 2019-12-18 LAB
COPATH REPORT: NORMAL
PAP: NORMAL

## 2019-12-19 ENCOUNTER — ANCILLARY PROCEDURE (OUTPATIENT)
Dept: ULTRASOUND IMAGING | Facility: CLINIC | Age: 54
End: 2019-12-19
Attending: OBSTETRICS & GYNECOLOGY
Payer: COMMERCIAL

## 2019-12-19 DIAGNOSIS — T83.32XA INTRAUTERINE CONTRACEPTIVE DEVICE THREADS LOST, INITIAL ENCOUNTER: ICD-10-CM

## 2019-12-19 PROCEDURE — 76856 US EXAM PELVIC COMPLETE: CPT | Performed by: OBSTETRICS & GYNECOLOGY

## 2019-12-19 PROCEDURE — 76830 TRANSVAGINAL US NON-OB: CPT | Performed by: OBSTETRICS & GYNECOLOGY

## 2019-12-20 LAB
FINAL DIAGNOSIS: NORMAL
HPV HR 12 DNA CVX QL NAA+PROBE: NEGATIVE
HPV16 DNA SPEC QL NAA+PROBE: NEGATIVE
HPV18 DNA SPEC QL NAA+PROBE: NEGATIVE
SPECIMEN DESCRIPTION: NORMAL
SPECIMEN SOURCE CVX/VAG CYTO: NORMAL

## 2019-12-30 ENCOUNTER — OFFICE VISIT (OUTPATIENT)
Dept: OBGYN | Facility: CLINIC | Age: 54
End: 2019-12-30
Payer: COMMERCIAL

## 2019-12-30 VITALS
SYSTOLIC BLOOD PRESSURE: 108 MMHG | WEIGHT: 179 LBS | BODY MASS INDEX: 27.13 KG/M2 | DIASTOLIC BLOOD PRESSURE: 78 MMHG | HEIGHT: 68 IN

## 2019-12-30 DIAGNOSIS — N93.8 DUB (DYSFUNCTIONAL UTERINE BLEEDING): ICD-10-CM

## 2019-12-30 DIAGNOSIS — Z30.430 ENCOUNTER FOR INSERTION OF INTRAUTERINE CONTRACEPTIVE DEVICE: Primary | ICD-10-CM

## 2019-12-30 PROCEDURE — 58100 BIOPSY OF UTERUS LINING: CPT | Performed by: OBSTETRICS & GYNECOLOGY

## 2019-12-30 PROCEDURE — 58300 INSERT INTRAUTERINE DEVICE: CPT | Performed by: OBSTETRICS & GYNECOLOGY

## 2019-12-30 PROCEDURE — 88305 TISSUE EXAM BY PATHOLOGIST: CPT | Performed by: OBSTETRICS & GYNECOLOGY

## 2019-12-30 PROCEDURE — 99213 OFFICE O/P EST LOW 20 MIN: CPT | Mod: 25 | Performed by: OBSTETRICS & GYNECOLOGY

## 2019-12-30 ASSESSMENT — MIFFLIN-ST. JEOR: SCORE: 1462.5

## 2019-12-30 NOTE — NURSING NOTE
"Chief Complaint   Patient presents with     IUD     Mirena       Initial /78 (BP Location: Right arm, Patient Position: Chair, Cuff Size: Adult Large)   Ht 1.715 m (5' 7.5\")   Wt 81.2 kg (179 lb)   LMP 2019 (Exact Date)   Breastfeeding No   BMI 27.62 kg/m   Estimated body mass index is 27.62 kg/m  as calculated from the following:    Height as of this encounter: 1.715 m (5' 7.5\").    Weight as of this encounter: 81.2 kg (179 lb).  BP completed using cuff size: regular    Questioned patient about current smoking habits.  Pt. has never smoked.          The following HM Due: NONE    Teresa Clayton CMA    "

## 2019-12-30 NOTE — PROGRESS NOTES
"  SUBJECTIVE:                                                   CC:  Patient presents with:  IUD: Mirena  Lot #[id number]  Exp: 3/22    HPI:  Shanna Sutton is a 54 year old  who presents for EMB and Mirena IUD placement.  H/o AUB, had the IUD placed 2 years ago, bleeding recently had been worse than usual, then on recent exam the IUD was noted to be absent.  US confirmed absence of the IUD and a thickened EMS.      ROS: 10 point ROS negative other than as listed above in HPI.    Gyn History:  Patient's last menstrual period was 2019 (exact date).     Patient is sexually active.  Using vasectomy for contraception.      Last 3 Pap and HPV Results:   PAP / HPV Latest Ref Rng & Units 2019   PAP - NIL NIL NIL   HPV 16 DNA NEG:Negative Negative - -   HPV 18 DNA NEG:Negative Negative - -   OTHER HR HPV NEG:Negative Negative - -       PMH, PSH, Soc Hx, Fam Hx, Meds, and allergies reviewed in Epic.  Has h/o PLAB2 mutation which is higher risk of breast cancer, no evidence so far of increased ovarian cancer    OBJECTIVE:     /78 (BP Location: Right arm, Patient Position: Chair, Cuff Size: Adult Large)   Ht 1.715 m (5' 7.5\")   Wt 81.2 kg (179 lb)   LMP 2019 (Exact Date)   Breastfeeding No   BMI 27.62 kg/m      Gen: Healthy appearing female, no acute distress, comfortable  HENT: No scleral injection or icterus  CV: Regular rate  Resp: Normal work of breathing, no cough  Skin: No suspicious lesions or rashes  Psychiatric: mentation appears normal and affect bright    : labia minora slightly sclerotic and appearance of small fissure on posterior peritoneum possibly c/w LS.  normal hair distribution.  SSE: see below procedure note    Test Results:  CLINICAL INFORMATION     Indications for ultrasound: Check IUD     LMP: 2019    Hormones: IUD     Measurements:  Uterus: 8.7 x 5.4 x 6.0 cm.     Position is retroverted.  Contour is irreg w myomata:   1) mid 2.3 x " 1.6 cm, 2) mid 1.2 x 0.9 cm, 3) mid 1.2 x 0.cm.     Endo cav: 14.7 mm         Prominent and Distorted  Cervix: Wnl     Right ovary: 2.7 x 1.7 x 2.2 cm. Wnl  Left ovary: 6.1 x 6.1 x 7.0 cm. Simple cyst 5.9 x 4.7 x 5.5cm     Cul de sac: no free fluid     ===================================  Complete pelvic ultrasound using realtime   transabdominal and transvaginal scanning.  Bladder appears normal.     Uterine fibroids, not affecting the uterine cavity  Simple left ovarian cyst(s)  Prominent endometrial lining   IUD not visualized     To ensure resolution or evaluate for growth of this ovarian cyst, consider repeat ultrasound in 6-8 weeks.  Prominent heterogenous endometrial lining; consider endometrial sampling if appropriate.  IUD is not visualized and further follow up is suggested.    Endometrial biopsy procedure note  12/30/2019     INDICATIONS:                                                    Is a pregnancy test required: No.  Was a consent obtained?  Yes    Having endometrial biopsy for abnormal uterine bleeding and thickened EMS    PROCEDURE;                                                      A speculum was placed in the vagina and cervix prepped with betadine. A tenaculum was not attached to the cervix. A small plastic 5 mm Pipelle syringe curette was inserted into the cervical canal. The uterus was sounded to 8 cm's. A vigorous four quadrant biopsy was performed, removing amount large of tissue.  This tissue was placed in Formalin and sent to pathology.    IUD Insertion Procedure Note  12/30/2019     A tenaculum was placed on the anterior lip of the cervix. A Mirena IUD was inserted in a sterile fashion and placed in the uterus with a 3cm tail. The patient tolerated the procedure with no complications.     The patient was instructed to return to clinic in three to four weeks to check the length of the strings if she could not feel them herself at home. Also instructed to call with symptoms of infection  such as a fever, heavy bleeding, or severe pain not controlled with over the counter medication. She was advised to use ibuprofen as needed for mild to moderate pain.  She was counseled that the IUD does not protect against STIs and that she will need to have a new device placed in 5 years.  All pt questions were answered.      Catalina Nation MD       ASSESSMENT/PLAN:                                                      1. Encounter for insertion of intrauterine contraceptive device  - levonorgestrel (MIRENA) 20 MCG/24HR IUD; 1 each (20 mcg) by Intrauterine route once  - levonorgestrel (MIRENA) 20 MCG/24HR IUD 20 mcg  - INSERTION INTRAUTERINE DEVICE    2. DUB (dysfunctional uterine bleeding)  Again briefly reviewed etiologies for abnormal uterine bleeding.  Recommend endometrial biopsy to rule out malignancy.  She elected for Mirena IUD placed concurrently with endometrial biopsy. See procedure note above.  If the Mirena IUD does not improve her bleeding pattern she may elect for total vaginal hysterectomy and bilateral salpingectomy, but will consider it for later next summer to giver her body some time to see what it does with the IUD and if she were to go through natural menopause on her own.  - ENDOMETRIAL BIOPSY W/O CERVICAL DILATION     Catalina Nation MD, MPH  Obstetrics and Gynecology

## 2020-01-02 LAB — COPATH REPORT: NORMAL

## 2020-03-11 ENCOUNTER — TELEPHONE (OUTPATIENT)
Dept: OBGYN | Facility: CLINIC | Age: 55
End: 2020-03-11

## 2020-03-11 NOTE — TELEPHONE ENCOUNTER
Panel Management Review          Composite cancer screening  Chart review shows that this patient is due/due soon for the following Mammogram  Summary:    Patient is due/failing the following:   MAMMOGRAM    Action needed:   Patient needs referral/order: mammogran    Type of outreach:    Sent letter.    Questions for provider review:    None                                                                                                                                    Teresa Clayton CMA

## 2020-03-11 NOTE — LETTER
[place] [address]  [address]  [facility] [address]  [phone]  March 11, 2020    Shanna Sutton  [address]  [facility] [address]    Dear Shanna,        We are reaching out to you because you are due for Mammogram.  We track this to make sure you are getting proper screening for breast cancers.     If you had this at another clinic outside of [place], we ask that you call back to relay this information; where you had this test, approximate date and the result.     Otherwise we ask that you please make an appointment with our clinic for an exam, or let us know if you will be seeing another clinic for our tracking purposes.      Healthy regards,      Catalina Nation MD

## 2020-05-24 DIAGNOSIS — K21.9 GASTROESOPHAGEAL REFLUX DISEASE, ESOPHAGITIS PRESENCE NOT SPECIFIED: ICD-10-CM

## 2020-05-27 NOTE — TELEPHONE ENCOUNTER
Pending Prescriptions:                       Disp   Refills    omeprazole (PRILOSEC) 40 MG DR capsule [Ph*90 cap*2        Sig: TAKE 1 CAPSULE (40 MG) BY MOUTH DAILY TAKE 30-60           MINUTES BEFORE A MEAL.    Routing refill request to provider for review/approval because:  Patient needs to be seen because it has been more than 1 year since last office visit.

## 2020-05-28 DIAGNOSIS — E78.2 MIXED HYPERLIPIDEMIA: ICD-10-CM

## 2020-05-28 RX ORDER — OMEPRAZOLE 40 MG/1
CAPSULE, DELAYED RELEASE ORAL
Qty: 90 CAPSULE | Refills: 2 | OUTPATIENT
Start: 2020-05-28

## 2020-05-29 RX ORDER — ATORVASTATIN CALCIUM 20 MG/1
TABLET, FILM COATED ORAL
Qty: 90 TABLET | Refills: 3 | Status: SHIPPED | OUTPATIENT
Start: 2020-05-29 | End: 2021-07-05

## 2020-05-29 NOTE — TELEPHONE ENCOUNTER
Pending Prescriptions:                       Disp   Refills    atorvastatin (LIPITOR) 20 MG tablet [Pharm*90 tab*3        Sig: TAKE 1 TABLET BY MOUTH EVERY DAY    Routing refill request to provider for review/approval because:  Labs not current: LDL

## 2020-06-01 ENCOUNTER — VIRTUAL VISIT (OUTPATIENT)
Dept: INTERNAL MEDICINE | Facility: CLINIC | Age: 55
End: 2020-06-01
Payer: COMMERCIAL

## 2020-06-01 DIAGNOSIS — E78.2 MIXED HYPERLIPIDEMIA: Primary | ICD-10-CM

## 2020-06-01 PROCEDURE — 99213 OFFICE O/P EST LOW 20 MIN: CPT | Mod: TEL | Performed by: INTERNAL MEDICINE

## 2020-06-01 NOTE — PROGRESS NOTES
"Shanna Sutton is a 54 year old female who is being evaluated via a billable telephone visit.      The patient has been notified of following:     \"This telephone visit will be conducted via a call between you and your physician/provider. We have found that certain health care needs can be provided without the need for a physical exam.  This service lets us provide the care you need with a short phone conversation.  If a prescription is necessary we can send it directly to your pharmacy.  If lab work is needed we can place an order for that and you can then stop by our lab to have the test done at a later time.    Telephone visits are billed at different rates depending on your insurance coverage. During this emergency period, for some insurers they may be billed the same as an in-person visit.  Please reach out to your insurance provider with any questions.    If during the course of the call the physician/provider feels a telephone visit is not appropriate, you will not be charged for this service.\"    Patient has given verbal consent for Telephone visit?  Yes    What phone number would you like to be contacted at? [phone]    How would you like to obtain your AVS? Tea Bee     Shanna Sutton is a 54 year old female who presents via phone visit today for the following health issues:    HPI  Hyperlipidemia Follow-Up      Are you regularly taking any medication or supplement to lower your cholesterol?   Yes- statin    Are you having muscle aches or other side effects that you think could be caused by your cholesterol lowering medication?  No      How many servings of fruits and vegetables do you eat daily?  4 or more    On average, how many sweetened beverages do you drink each day (Examples: soda, juice, sweet tea, etc.  Do NOT count diet or artificially sweetened beverages)?   1    How many days per week do you exercise enough to make your heart beat faster? 3 or less    How many minutes a day " do you exercise enough to make your heart beat faster? 20 - 29    How many days per week do you miss taking your medication? 0       Patient Active Problem List   Diagnosis     Mixed hyperlipidemia     Benign neoplasm of skin     HYPERLIPIDEMIA LDL GOAL <130     Papanicolaou smear of cervix with atypical squamous cells cannot exclude high grade squamous intraepithelial lesion (ASC-H)     Family history of colon cancer     Gastroesophageal reflux disease, esophagitis presence not specified     Mirena IUD insertion 12/5/2017 - remove on or before 12/5/2022     Intrauterine contraceptive device threads lost, initial encounter     DUB (dysfunctional uterine bleeding)     Past Surgical History:   Procedure Laterality Date     BREAST SURGERY  1/11/2017    prophylactic double mastectomy     Mastectomy Bilateral 01/11/2017    prophylactic double mastectomy: 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, 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       Social History     Tobacco Use     Smoking status: Never Smoker     Smokeless tobacco: Never Used   Substance Use Topics     Alcohol use: Yes     Alcohol/week: 0.0 standard drinks     Comment: Rarely     Family History   Problem Relation Age of Onset     Breast Cancer Mother         age 65     Cancer - colorectal Mother         age 73     Respiratory Father         COPD     Skin Cancer Father      Family History Negative Brother         6-healthy     Family History Negative Sister         5-healthy     Breast Cancer Sister         diagnosed at age 43     Thyroid Disease Sister         3 sisters     Breast Cancer Sister         diagnosed at age 58     Breast Cancer Sister      Thyroid Disease Sister          Current Outpatient Medications   Medication Sig Dispense Refill     atorvastatin (LIPITOR) 20 MG tablet TAKE 1 TABLET BY MOUTH EVERY DAY 90 tablet 3     levonorgestrel (MIRENA) 20 MCG/24HR IUD 1 each (20 mcg) by Intrauterine route once       omeprazole (PRILOSEC) 40 MG DR capsule TAKE 1 CAPSULE (40 MG) BY MOUTH DAILY TAKE 30-60  MINUTES BEFORE A MEAL. 90 capsule 2     clobetasol (TEMOVATE) 0.05 % external ointment Apply topically 2 times daily For 2 weeks, then twice per week for symptoms. (Patient not taking: Reported on 12/30/2019) 30 g 3       Reviewed and updated as needed this visit by Provider         Review of Systems   CONSTITUTIONAL: NEGATIVE for fever, chills, change in weight  ENT/MOUTH: NEGATIVE for ear, mouth and throat problems  RESP: NEGATIVE for significant cough or SOB  CV: NEGATIVE for chest pain, palpitations or peripheral edema  MUSCULOSKELETAL: NEGATIVE for significant arthralgias or myalgia  NEURO: NEGATIVE for weakness, dizziness or paresthesias  PSYCHIATRIC: NEGATIVE for changes in mood or affect       Objective   Reported vitals:  There were no vitals taken for this visit.   healthy, alert and no distress  PSYCH: Alert and oriented times 3; coherent speech, normal   rate and volume, able to articulate logical thoughts, able   to abstract reason, no tangential thoughts, no hallucinations   or delusions  Her affect is normal  RESP: No cough, no audible wheezing, able to talk in full sentences  Remainder of exam unable to be completed due to telephone visits    Diagnostic Test Results:  Labs reviewed in Epic        Assessment/Plan:     (E78.2) Mixed hyperlipidemia  (primary encounter diagnosis)  Plan: On Lipitor 20 mg daily has been refilled .explained clearly about the medication,insructions and side effects. Will check lipid panel reflex to direct LDL Fasting, ALT, AST.pt was told I will contact her after results and proceed accordingly.             Return in about 43 weeks (around 3/29/2021).      Phone call duration:  7 minutes    Lexi Forrester MD

## 2020-06-30 DIAGNOSIS — K21.9 GASTROESOPHAGEAL REFLUX DISEASE, ESOPHAGITIS PRESENCE NOT SPECIFIED: ICD-10-CM

## 2020-06-30 RX ORDER — OMEPRAZOLE 40 MG/1
CAPSULE, DELAYED RELEASE ORAL
Qty: 90 CAPSULE | Refills: 2 | Status: SHIPPED | OUTPATIENT
Start: 2020-06-30 | End: 2021-05-11

## 2020-06-30 NOTE — TELEPHONE ENCOUNTER
Pending Prescriptions:                       Disp   Refills    omeprazole (PRILOSEC) 40 MG DR capsule [P*90 cap*2            Sig: TAKE 1 CAPSULE (40 MG) BY MOUTH DAILY TAKE 30-60           MINUTES BEFORE A MEAL.    Prescription approved per [facility] Refill Protocol.

## 2021-01-14 ENCOUNTER — HEALTH MAINTENANCE LETTER (OUTPATIENT)
Age: 56
End: 2021-01-14

## 2021-05-10 DIAGNOSIS — K21.9 GASTROESOPHAGEAL REFLUX DISEASE: ICD-10-CM

## 2021-05-11 RX ORDER — OMEPRAZOLE 40 MG/1
CAPSULE, DELAYED RELEASE ORAL
Qty: 90 CAPSULE | Refills: 0 | Status: SHIPPED | OUTPATIENT
Start: 2021-05-11 | End: 2021-08-09

## 2021-07-03 DIAGNOSIS — E78.2 MIXED HYPERLIPIDEMIA: ICD-10-CM

## 2021-07-03 NOTE — LETTER
[place] [address]  [facility] [address]  Phone: [phone]        July 8, 2021      Shanna HERNANDEZ Lesley                                                                                                                                [address] [facility] [address]            Dear MsMatti Sutton,    We are concerned about your health care.  We recently provided you with a medication refill.  Many medications require routine follow-up with your Doctor.      At this time we ask that: You schedule a routine office visit with your physician to follow your cholesterol     Your prescription: Has been refilled for 1 time so you may have time for the above noted follow-up.      Thank you,      [facility] /KENNETH Forrester MD.

## 2021-07-05 RX ORDER — ATORVASTATIN CALCIUM 20 MG/1
TABLET, FILM COATED ORAL
Qty: 90 TABLET | Refills: 0 | Status: SHIPPED | OUTPATIENT
Start: 2021-07-05 | End: 2021-10-08

## 2021-07-05 NOTE — TELEPHONE ENCOUNTER
"Requested Prescriptions   Pending Prescriptions Disp Refills     atorvastatin (LIPITOR) 20 MG tablet [Pharmacy Med Name: ATORVASTATIN 20 MG TABLET] 90 tablet 3     Sig: TAKE 1 TABLET BY MOUTH EVERY DAY       Statins Protocol Failed - 7/3/2021  9:01 AM        Failed - LDL on file in past 12 months     Recent Labs   Lab Test 03/29/19  0850   *             Failed - Recent (12 mo) or future (30 days) visit within the authorizing provider's specialty     Patient has had an office visit with the authorizing provider or a provider within the authorizing providers department within the previous 12 mos or has a future within next 30 days. See \"Patient Info\" tab in inbasket, or \"Choose Columns\" in Meds & Orders section of the refill encounter.              Passed - No abnormal creatine kinase in past 12 months     No lab results found.             Passed - Medication is active on med list        Passed - Patient is age 18 or older        Passed - No active pregnancy on record        Passed - No positive pregnancy test in past 12 months             "

## 2021-07-05 NOTE — TELEPHONE ENCOUNTER
Medication is being filled for 1 time refill only due to:  Patient needs to be seen because it has been more than one year since last visit.     Please call patient or mail a letter

## 2021-08-05 DIAGNOSIS — K21.9 GASTROESOPHAGEAL REFLUX DISEASE: ICD-10-CM

## 2021-08-05 DIAGNOSIS — K21.9 GASTROESOPHAGEAL REFLUX DISEASE, UNSPECIFIED WHETHER ESOPHAGITIS PRESENT: Primary | ICD-10-CM

## 2021-08-05 RX ORDER — OMEPRAZOLE 40 MG/1
CAPSULE, DELAYED RELEASE ORAL
Qty: 90 CAPSULE | Refills: 0 | OUTPATIENT
Start: 2021-08-05

## 2021-08-05 NOTE — TELEPHONE ENCOUNTER
Pending Prescriptions:                       Disp   Refills    omeprazole (PRILOSEC) 40 MG DR capsule [Ph*90 cap*0        Sig: TAKE 1 CAPSULE (40 MG) BY MOUTH DAILY TAKE 30-60           MINUTES BEFORE A MEAL.    Routing refill request to provider for review/approval because:  Isis given x1 and patient did not follow up, please advise  Patient needs to be seen because it has been more than 1 year since last office visit.

## 2021-08-09 RX ORDER — OMEPRAZOLE 40 MG/1
CAPSULE, DELAYED RELEASE ORAL
Qty: 60 CAPSULE | Refills: 0 | Status: SHIPPED | OUTPATIENT
Start: 2021-08-09 | End: 2021-09-29

## 2021-08-09 NOTE — TELEPHONE ENCOUNTER
Patient notified and expressed understanding. Appointment scheduled for 9/29/21. That was the soonest patient come in because of being out-of-town. Patient wondering if her Rx can be filled to last until appointment?

## 2021-09-29 ENCOUNTER — OFFICE VISIT (OUTPATIENT)
Dept: INTERNAL MEDICINE | Facility: CLINIC | Age: 56
End: 2021-09-29
Payer: COMMERCIAL

## 2021-09-29 VITALS
DIASTOLIC BLOOD PRESSURE: 79 MMHG | RESPIRATION RATE: 20 BRPM | OXYGEN SATURATION: 96 % | HEART RATE: 75 BPM | SYSTOLIC BLOOD PRESSURE: 113 MMHG | HEIGHT: 67 IN | TEMPERATURE: 98.5 F | WEIGHT: 185.9 LBS | BODY MASS INDEX: 29.18 KG/M2

## 2021-09-29 DIAGNOSIS — K21.9 GASTROESOPHAGEAL REFLUX DISEASE, UNSPECIFIED WHETHER ESOPHAGITIS PRESENT: ICD-10-CM

## 2021-09-29 DIAGNOSIS — E78.2 MIXED HYPERLIPIDEMIA: Primary | ICD-10-CM

## 2021-09-29 PROCEDURE — 36415 COLL VENOUS BLD VENIPUNCTURE: CPT | Performed by: INTERNAL MEDICINE

## 2021-09-29 PROCEDURE — 80061 LIPID PANEL: CPT | Performed by: INTERNAL MEDICINE

## 2021-09-29 PROCEDURE — 80053 COMPREHEN METABOLIC PANEL: CPT | Performed by: INTERNAL MEDICINE

## 2021-09-29 PROCEDURE — 99213 OFFICE O/P EST LOW 20 MIN: CPT | Performed by: INTERNAL MEDICINE

## 2021-09-29 RX ORDER — ATORVASTATIN CALCIUM 20 MG/1
20 TABLET, FILM COATED ORAL DAILY
Qty: 90 TABLET | Refills: 0 | Status: CANCELLED | OUTPATIENT
Start: 2021-09-29

## 2021-09-29 RX ORDER — OMEPRAZOLE 40 MG/1
CAPSULE, DELAYED RELEASE ORAL
Qty: 90 CAPSULE | Refills: 1 | Status: SHIPPED | OUTPATIENT
Start: 2021-09-29 | End: 2022-11-25

## 2021-09-29 RX ORDER — VITAMIN B COMPLEX
TABLET ORAL DAILY
COMMUNITY

## 2021-09-29 RX ORDER — MULTIPLE VITAMINS W/ MINERALS TAB 9MG-400MCG
1 TAB ORAL DAILY
COMMUNITY

## 2021-09-29 ASSESSMENT — MIFFLIN-ST. JEOR: SCORE: 1479.83

## 2021-09-29 NOTE — PROGRESS NOTES
"  Assessment & Plan     (E78.2) Mixed hyperlipidemia  (primary encounter diagnosis)  Plan: On Lipitor 20 mg daily, check comprehensive metabolic panel, Lipid panel         reflex to direct LDL Fasting and adjust Lipitor dose after results.            (K21.9) Gastroesophageal reflux disease, unspecified whether esophagitis present  Plan: omeprazole (PRILOSEC) 40 MG DR capsule refilled as directed.explained clearly about the medication,insructions and side effects including long-term side effects of osteopenia.  Patient was advised to wean down omeprazole if her symptoms are under control.  Patient vitritis       Review of the result(s) of each unique test - lipids, CMP   Prescription drug management        BMI:   Estimated body mass index is 28.9 kg/m  as calculated from the following:    Height as of this encounter: 1.708 m (5' 7.25\").    Weight as of this encounter: 84.3 kg (185 lb 14.4 oz).   Weight management plan: Discussed healthy diet and exercise guidelines      Return in about 1 year (around 9/29/2022).    Lexi Forrester MD  [facility]AS[facility]o is a 56 year old who presents for the following health issues     HPI     Hyperlipidemia Follow-Up      Are you regularly taking any medication or supplement to lower your cholesterol?   Yes- atorvastatin     Are you having muscle aches or other side effects that you think could be caused by your cholesterol lowering medication?  No    Gastroesophageal reflux disease: Takes omeprazole 20 mg daily with good symptom relief.  And requesting refills        How many servings of fruits and vegetables do you eat daily?  2-3    On average, how many sweetened beverages do you drink each day (Examples: soda, juice, sweet tea, etc.  Do NOT count diet or artificially sweetened beverages)?   0    How many days per week do you exercise enough to make your heart beat faster? 5    How many minutes a day do you exercise enough to " "make your heart beat faster? 60 or more    How many days per week do you miss taking your medication? 0      Past Medical History:   Diagnosis Date     ASCUS on Pap smear 8/21/07, 11/20/09, 2/14/11    - HPV, - HPV, -HPV     Benign neoplasm of skin, site unspecified      Family history of colon cancer      Family history of colon cancer      History of colposcopy with cervical biopsy 3/1/11    WNL     Infectious mononucleosis      Mixed hyperlipidemia      Slow transit constipation     irritable bowel syndrome  abstracted [id number]       Current Outpatient Medications   Medication Sig Dispense Refill     atorvastatin (LIPITOR) 20 MG tablet TAKE 1 TABLET BY MOUTH EVERY DAY 90 tablet 0     multivitamin w/minerals (MULTI-VITAMIN) tablet Take 1 tablet by mouth daily       omeprazole (PRILOSEC) 40 MG DR capsule TAKE 1 CAPSULE (40 MG) BY MOUTH DAILY TAKE 30-60 MINUTES BEFORE A MEAL. 90 capsule 1     Vitamin D3 (CHOLECALCIFEROL) 25 mcg (1000 units) tablet Take by mouth daily       clobetasol (TEMOVATE) 0.05 % external ointment Apply topically 2 times daily For 2 weeks, then twice per week for symptoms. (Patient not taking: Reported on 12/30/2019) 30 g 3         Review of Systems   CONSTITUTIONAL: NEGATIVE for fever, chills, change in weight  RESP: NEGATIVE for significant cough or SOB  CV: NEGATIVE for chest pain, palpitations or peripheral edema  GI: h/o gerd  MUSCULOSKELETAL: NEGATIVE for significant arthralgias or myalgia      Objective    /79 (BP Location: Left arm, Patient Position: Sitting, Cuff Size: Adult Large)   Pulse 75   Temp 98.5  F (36.9  C) (Oral)   Resp 20   Ht 1.708 m (5' 7.25\")   Wt 84.3 kg (185 lb 14.4 oz)   SpO2 96%   BMI 28.90 kg/m    Body mass index is 28.9 kg/m .  Physical Exam   GENERAL: healthy, alert and no distress  RESP: lungs clear to auscultation - no rales, rhonchi or wheezes  CV: regular rate and rhythm,    MS: no gross musculoskeletal defects noted, no edema  PSYCH: mentation " appears normal, affect normal/bright

## 2021-09-29 NOTE — NURSING NOTE
"/79 (BP Location: Left arm, Patient Position: Sitting, Cuff Size: Adult Large)   Pulse 75   Temp 98.5  F (36.9  C) (Oral)   Resp 20   Ht 1.708 m (5' 7.25\")   Wt 84.3 kg (185 lb 14.4 oz)   SpO2 96%   BMI 28.90 kg/m      "

## 2021-09-30 LAB
ALBUMIN SERPL-MCNC: 3.8 G/DL (ref 3.4–5)
ALP SERPL-CCNC: 139 U/L (ref 40–150)
ALT SERPL W P-5'-P-CCNC: 100 U/L (ref 0–50)
ANION GAP SERPL CALCULATED.3IONS-SCNC: 5 MMOL/L (ref 3–14)
AST SERPL W P-5'-P-CCNC: 73 U/L (ref 0–45)
BILIRUB SERPL-MCNC: 0.5 MG/DL (ref 0.2–1.3)
BUN SERPL-MCNC: 13 MG/DL (ref 7–30)
CALCIUM SERPL-MCNC: 9.1 MG/DL (ref 8.5–10.1)
CHLORIDE BLD-SCNC: 108 MMOL/L (ref 94–109)
CHOLEST SERPL-MCNC: 210 MG/DL
CO2 SERPL-SCNC: 26 MMOL/L (ref 20–32)
CREAT SERPL-MCNC: 0.88 MG/DL (ref 0.52–1.04)
FASTING STATUS PATIENT QL REPORTED: YES
GFR SERPL CREATININE-BSD FRML MDRD: 75 ML/MIN/1.73M2
GLUCOSE BLD-MCNC: 89 MG/DL (ref 70–99)
HDLC SERPL-MCNC: 61 MG/DL
LDLC SERPL CALC-MCNC: 103 MG/DL
NONHDLC SERPL-MCNC: 149 MG/DL
POTASSIUM BLD-SCNC: 4.4 MMOL/L (ref 3.4–5.3)
PROT SERPL-MCNC: 7.6 G/DL (ref 6.8–8.8)
SODIUM SERPL-SCNC: 139 MMOL/L (ref 133–144)
TRIGL SERPL-MCNC: 229 MG/DL

## 2021-10-12 ENCOUNTER — HOSPITAL ENCOUNTER (OUTPATIENT)
Dept: ULTRASOUND IMAGING | Facility: CLINIC | Age: 56
Discharge: HOME | End: 2021-10-12
Attending: INTERNAL MEDICINE | Admitting: INTERNAL MEDICINE
Payer: COMMERCIAL

## 2021-10-12 DIAGNOSIS — R74.8 ELEVATED LIVER ENZYMES: ICD-10-CM

## 2021-10-12 PROCEDURE — 76705 ECHO EXAM OF ABDOMEN: CPT

## 2021-10-24 ENCOUNTER — HEALTH MAINTENANCE LETTER (OUTPATIENT)
Age: 56
End: 2021-10-24

## 2021-11-04 ENCOUNTER — HOSPITAL ENCOUNTER (OUTPATIENT)
Dept: MRI IMAGING | Facility: CLINIC | Age: 56
Discharge: HOME | End: 2021-11-04
Attending: INTERNAL MEDICINE | Admitting: INTERNAL MEDICINE
Payer: COMMERCIAL

## 2021-11-04 DIAGNOSIS — R74.8 ELEVATED LIVER ENZYMES: ICD-10-CM

## 2021-11-04 DIAGNOSIS — K76.89 LIVER NODULE: ICD-10-CM

## 2021-11-04 PROCEDURE — A9585 GADOBUTROL INJECTION: HCPCS | Performed by: INTERNAL MEDICINE

## 2021-11-04 PROCEDURE — 255N000002 HC RX 255 OP 636: Performed by: INTERNAL MEDICINE

## 2021-11-04 PROCEDURE — 74183 MRI ABD W/O CNTR FLWD CNTR: CPT

## 2021-11-04 RX ORDER — GADOBUTROL 604.72 MG/ML
10 INJECTION INTRAVENOUS ONCE
Status: COMPLETED | OUTPATIENT
Start: 2021-11-04 | End: 2021-11-04

## 2021-11-04 RX ADMIN — GADOBUTROL 8 ML: 604.72 INJECTION INTRAVENOUS at 07:12

## 2021-11-07 DIAGNOSIS — K76.89 LIVER NODULE: Primary | ICD-10-CM

## 2021-11-16 ENCOUNTER — LAB (OUTPATIENT)
Dept: LAB | Facility: CLINIC | Age: 56
End: 2021-11-16
Payer: COMMERCIAL

## 2021-11-16 DIAGNOSIS — R74.8 ELEVATED LIVER ENZYMES: ICD-10-CM

## 2021-11-16 LAB
ALBUMIN SERPL-MCNC: 4 G/DL (ref 3.4–5)
ALP SERPL-CCNC: 104 U/L (ref 40–150)
ALT SERPL W P-5'-P-CCNC: 48 U/L (ref 0–50)
AST SERPL W P-5'-P-CCNC: 31 U/L (ref 0–45)
BILIRUB DIRECT SERPL-MCNC: 0.1 MG/DL (ref 0–0.2)
BILIRUB SERPL-MCNC: 0.6 MG/DL (ref 0.2–1.3)
PROT SERPL-MCNC: 8.1 G/DL (ref 6.8–8.8)

## 2021-11-16 PROCEDURE — 80076 HEPATIC FUNCTION PANEL: CPT

## 2021-11-16 PROCEDURE — 36415 COLL VENOUS BLD VENIPUNCTURE: CPT

## 2022-01-04 ENCOUNTER — MYC MEDICAL ADVICE (OUTPATIENT)
Dept: INTERNAL MEDICINE | Facility: CLINIC | Age: 57
End: 2022-01-04
Payer: COMMERCIAL

## 2022-01-04 ENCOUNTER — TRANSFERRED RECORDS (OUTPATIENT)
Dept: HEALTH INFORMATION MANAGEMENT | Facility: CLINIC | Age: 57
End: 2022-01-04
Payer: COMMERCIAL

## 2022-01-04 DIAGNOSIS — L98.9 SKIN DISORDER: Primary | ICD-10-CM

## 2022-01-05 NOTE — TELEPHONE ENCOUNTER
Please see patient's MyChart message and advise. Patient has concerning skin mole and cannot be seen by a [facility] dermatologist until May. Is there a way for her to be seen sooner for this?     Last dermatology referral was on 2/14/2011 to [facility]. Patient last had an office visit with dermatology on 10/26/2017.     Last office visit with Dr. Forrester was on 9/29/2021.     Iram GONZALEZ RN   [facility]

## 2022-02-13 ENCOUNTER — HEALTH MAINTENANCE LETTER (OUTPATIENT)
Age: 57
End: 2022-02-13

## 2022-02-17 PROBLEM — K21.9 GASTROESOPHAGEAL REFLUX DISEASE: Status: ACTIVE | Noted: 2017-09-11

## 2022-04-05 ENCOUNTER — TRANSFERRED RECORDS (OUTPATIENT)
Dept: HEALTH INFORMATION MANAGEMENT | Facility: CLINIC | Age: 57
End: 2022-04-05
Payer: COMMERCIAL

## 2022-04-25 ENCOUNTER — LAB (OUTPATIENT)
Dept: LAB | Facility: CLINIC | Age: 57
End: 2022-04-25
Payer: COMMERCIAL

## 2022-04-25 ENCOUNTER — ANCILLARY PROCEDURE (OUTPATIENT)
Dept: ULTRASOUND IMAGING | Facility: CLINIC | Age: 57
End: 2022-04-25
Attending: OBSTETRICS & GYNECOLOGY
Payer: COMMERCIAL

## 2022-04-25 DIAGNOSIS — N94.9 ADNEXAL CYST: ICD-10-CM

## 2022-04-25 LAB — CANCER AG125 SERPL-ACNC: 7 U/ML (ref 0–30)

## 2022-04-25 PROCEDURE — 76830 TRANSVAGINAL US NON-OB: CPT | Performed by: OBSTETRICS & GYNECOLOGY

## 2022-04-25 PROCEDURE — 76856 US EXAM PELVIC COMPLETE: CPT | Performed by: OBSTETRICS & GYNECOLOGY

## 2022-04-25 PROCEDURE — 86304 IMMUNOASSAY TUMOR CA 125: CPT

## 2022-04-25 PROCEDURE — 36415 COLL VENOUS BLD VENIPUNCTURE: CPT

## 2022-04-29 ENCOUNTER — OFFICE VISIT (OUTPATIENT)
Dept: OBGYN | Facility: CLINIC | Age: 57
End: 2022-04-29
Payer: COMMERCIAL

## 2022-04-29 VITALS
SYSTOLIC BLOOD PRESSURE: 122 MMHG | WEIGHT: 163 LBS | DIASTOLIC BLOOD PRESSURE: 76 MMHG | HEIGHT: 67 IN | HEART RATE: 62 BPM | BODY MASS INDEX: 25.58 KG/M2

## 2022-04-29 DIAGNOSIS — R93.89 THICKENED ENDOMETRIUM: ICD-10-CM

## 2022-04-29 DIAGNOSIS — Z80.3 FAMILY HISTORY OF MALIGNANT NEOPLASM OF BREAST: ICD-10-CM

## 2022-04-29 DIAGNOSIS — N83.202 LEFT OVARIAN CYST: Primary | ICD-10-CM

## 2022-04-29 PROCEDURE — 99214 OFFICE O/P EST MOD 30 MIN: CPT | Performed by: OBSTETRICS & GYNECOLOGY

## 2022-04-29 NOTE — NURSING NOTE
"Chief Complaint   Patient presents with     RECHECK     Follow up to discuss test results.       Initial /76   Pulse 62   Ht 1.708 m (5' 7.25\")   Wt 73.9 kg (163 lb)   Breastfeeding No   BMI 25.34 kg/m   Estimated body mass index is 25.34 kg/m  as calculated from the following:    Height as of this encounter: 1.708 m (5' 7.25\").    Weight as of this encounter: 73.9 kg (163 lb).  BP completed using cuff size: regular    Questioned patient about current smoking habits.  Pt. has never smoked.          The following HM Due: NONE      The following patient reported/Care Every where data was sent to:  P ABSTRACT QUALITY INITIATIVES [[id number]]  Hoa Worrell LPN                "

## 2022-04-29 NOTE — PROGRESS NOTES
SUBJECTIVE:                                                     Shanna Sutton is a 56 year old female  who presents to clinic today for discussion of recent ultrasound confirmation that her previously seen left ovarian simple cyst is increasing in size.  This is asymptomatic.    In , she had a pelvic ultrasound during an evaluation for abnormal uterine bleeding which revealed a simple cyst on the left ovary.  At that time it measured 4.7 cm in largest dimension.  She continued to have abnormal uterine bleeding, and had another ultrasound 2 years later in 2019.  At this time, the left ovarian cyst was noted to have increased in size to 5.9 cm in largest dimension.  At that time the endometrium was prominent, and she underwent endometrial biopsy.  This was benign.  She had an attempted IUD placement on 2 occasions, but unfortunately her bleeding was heavy enough that she expelled the IUD both times.  After that her periods stopped, and she did not return for a recheck.  Recently, she had a follow-up ultrasound.  The left ovarian cyst has increased in size to 6.7 cm in largest dimension.  On my review of the images, there is a very small area inside the smooth wall which appears to be a regular hyperechoic mural wall nodule.  In addition, she has a prominent and heterogeneous endometrial lining measuring 10.3 cm.  She has not had any vaginal bleeding in over a year.        Problem list and histories reviewed & adjusted, as indicated.  Additional history: as documented.    Patient Active Problem List   Diagnosis     Mixed hyperlipidemia     Benign neoplasm of skin     HYPERLIPIDEMIA LDL GOAL <130     Papanicolaou smear of cervix with atypical squamous cells cannot exclude high grade squamous intraepithelial lesion (ASC-H)     Family history of colon cancer     Gastroesophageal reflux disease, esophagitis presence not specified     Mirena IUD insertion 2017 - remove on or before 2022      Intrauterine contraceptive device threads lost, initial encounter     DUB (dysfunctional uterine bleeding)     Past Surgical History:   Procedure Laterality Date     BREAST SURGERY  1/11/2017    prophylactic double mastectomy     Mastectomy Bilateral 01/11/2017    prophylactic double mastectomy: 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, 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      Social History     Tobacco Use     Smoking status: Never Smoker     Smokeless tobacco: Never Used   Substance Use Topics     Alcohol use: Yes     Alcohol/week: 0.0 standard drinks     Comment: Rarely      Problem (# of Occurrences) Relation (Name,Age of Onset)    Breast Cancer (4) Mother: age 65, Sister: diagnosed at age 43, Sister: diagnosed at age 58, Sister (Patricia)    Cancer - colorectal (1) Mother: age 73    Family History Negative (2) Brother: 6-healthy, Sister: 5-healthy    Respiratory (1) Father: COPD    Skin Cancer (1) Father    Thyroid Disease (2) Sister: 3 sisters, Sister (Patricia)            atorvastatin (LIPITOR) 20 MG tablet, TAKE 1 TABLET BY MOUTH EVERY DAY  multivitamin w/minerals (THERA-VIT-M) tablet, Take 1 tablet by mouth daily  omeprazole (PRILOSEC) 40 MG DR capsule, TAKE 1 CAPSULE (40 MG) BY MOUTH DAILY TAKE 30-60 MINUTES BEFORE A MEAL.  Vitamin D3 (CHOLECALCIFEROL) 25 mcg (1000 units) tablet, Take by mouth daily  clobetasol (TEMOVATE) 0.05 % external ointment, Apply topically 2 times daily For 2 weeks, then twice per week for symptoms. (Patient not taking: No sig reported)    No current facility-administered medications on file prior to visit.    Allergies   Allergen Reactions     Amoxicillin Hives       ROS:  Negative other than as noted in HPI    OBJECTIVE:     Exam:  Constitutional:  Appearance: Well nourished, well developed alert, in no acute distress  Neurologic/Psychiatric:  Mental Status:  Oriented X3       In-Clinic Test Results:  No results found for this or any previous visit (from the past 24 hour(s)).    ASSESSMENT/PLAN:                                                         ICD-10-CM    1. Left ovarian cyst  N83.202    2. Thickened endometrium  R93.89    3. Family history of malignant neoplasm of breast  Z80.3          -We discussed the finding of simple left ovarian cyst which has steadily increased in size over the last 5 years.  Its appearance on ultrasound, and the slow growth suggest that it is highly unlikely to represent malignancy, though it may be neoplastic.  There is a small, regular intramural nodule which was not seen on previous scans.  Due to the increase in size, the possibility that it may torse or cause other symptoms, and the fact that surgical removal may be more difficult in the future if it increases in size further lead me to recommend consideration for left salpingo-oophorectomy as a treatment option.    -She has a strong family history of breast cancer, and carries a known genetic mutation that increases the risk for breast cancer.  At the time of her last genetic consultation, this mutation was not known to confer an increased risk of uterine or ovarian cancer.  She is interested in meeting with the genetic counselor at the [facility], where she was first seen for this, in order to make an informed decision about her surgery.  Specifically, she is considering bilateral salpingo-oophorectomy versus unilateral salpingo-oophorectomy, and also total laparoscopic hysterectomy.    -She has a thickened and irregular endometrial stripe on ultrasound.  She has not had any bleeding, and the thickness of the endometrium is just below the measurement that would warrant endometrial sampling in the absence of bleeding.  However, given her history, if she plans more conservative surgery with unilateral bilateral salpingo-oophorectomy, I think hysteroscopy and D&C should be performed at the same time.  If she opts to proceed with total laparoscopic hysterectomy and bilateral salpingo-oophorectomy, then I think she needs to come back for endometrial  sampling prior to going to the operating room, in order to rule out hyperplasia or malignancy.    All of these things were discussed in detail.  We discussed that her Ca1 25 is normal, which is reassuring.  Once she has met with her genetic counselor, she will let me know what surgery she would like to proceed with, and we can schedule a phone visit for follow-up to talk about this in more detail.  We can then get her scheduled for the correct procedure.    Nikole Yarbrough MD  [facility]

## 2022-05-03 NOTE — PROGRESS NOTES
"  SUBJECTIVE:   Shanna Sutton is a 52 year old female who presents to clinic today for the following health issues:    Pt is a 52 year old female who is seen here to day with complains of heartburn since 3-4 months, patient says she has history of on and off heartburn, but has been more constant since the past 3-4 months, complains of pain when she is eating . she feels burning pain when the food touches her esopahagus  . There is no nausea, vomiting, has occasional belching and burping. No abdominal pain, patient has not been taking any over-the-counter medications for this.  caffine intake-none  alcohol-none  smoking-none  endoscopy  in the past- none                               Past Medical History:   Diagnosis Date     ASCUS on Pap smear 8/21/07, 11/20/09, 2/14/11    - HPV, - HPV, -HPV     Benign neoplasm of skin, site unspecified      Family history of colon cancer      Family history of colon cancer      History of colposcopy with cervical biopsy 3/1/11    WNL     Infectious mononucleosis      Mixed hyperlipidemia      Slow transit constipation     irritable bowel syndrome  [facility] [id number]       Current Outpatient Prescriptions   Medication Sig Dispense Refill     omeprazole (PRILOSEC) 40 MG capsule Take 1 capsule (40 mg) by mouth daily Take 30-60 minutes before a meal. 30 capsule 1     NO ACTIVE MEDICATIONS                              ROS-   GENERAL-negative.  ENT;negative  Cvcs;negative  Resp;negative  Gastrointestinal: heartburn       Blood pressure 104/80, temperature 97.7  F (36.5  C), temperature source Oral, height 5' 8\" (1.727 m), weight 168 lb 8 oz (76.4 kg), last menstrual period 07/19/2017, SpO2 97 %, not currently breastfeeding.    PHYSICAL EXAMINATION-  GENERAL:healthy, alert and no distress  HEENT-pupils equal and reactive to light and accommodation, oropharynx clear  NECK: NEGATIVE  RESP: Normal - CTA without rales, rhonchi, or wheezing.  CV: regular rate and rhythm  with normal S1, S2 ; " no murmur, rub or gallops  GI: no organomegaly, bowel sounds normal, soft, non-tender  MS: no edema, no calf tenderness      ASSESSMENT AND PLAN::      (K21.9) Gastroesophageal reflux disease, esophagitis presence not specified  (primary encounter diagnosis)  Plan: Discussed the etiology of GERD with patient.  Encouraged lifestyle changes and the need for a longterm regimen of medications to help reduce symptoms.  Discussed the potential for serious complications if symptoms are not resolved.  Discussed raising the head of the bed 4 to 6 inches; avoiding chocolate, coffee, peppermint, fruit juices, tomatoes, NSAID's ,greasy and spicy foods.  Stared on omeprazole (PRILOSEC) 40 MG capsule as directed.explained clearly about the medication,insructions and side effects. Call or return to clinic prn if these symtoms worsen, fail to improve as anticipated, or if new symptoms develop.                 [Recent Weight Gain (___ Lbs)] : recent [unfilled] ~Ulb weight gain

## 2022-05-06 ENCOUNTER — MYC MEDICAL ADVICE (OUTPATIENT)
Dept: OBGYN | Facility: CLINIC | Age: 57
End: 2022-05-06
Payer: COMMERCIAL

## 2022-05-06 NOTE — TELEPHONE ENCOUNTER
Yes, that is my recommendation: laparoscopic removal of the left tube and ovary and the right tube (this decreases the risk of ovarian cancer in the future and is typically done any time we are doing laparoscopy for someone who is not planning to have more children.) I'll send her info to Chika and she will be contacted soon about scheduling a date.    Chika:    Surgeon: Dr Nikole Yarbrough  Assist:   Dr. Brooks   Location: [facility]  Date/time preference:  per pt    Surgery:  single site LSO, right salpingectomy  Length of Surgery:  1 hour  Diagnosis:  persistent left ovarian cyst  Anesthesia type:  GENERAL    Special instructions / equipment:  gel point mini, Thunderbeat  Am admit or same day: SAME DAY  Bowel prep: No  Pre op: PCP  Office visit with surgeon prior to surgery: No  Schedule Post Op: 1-2 weeks    Nikole Yarbrough MD

## 2022-05-09 ENCOUNTER — MYC MEDICAL ADVICE (OUTPATIENT)
Dept: INTERNAL MEDICINE | Facility: CLINIC | Age: 57
End: 2022-05-09
Payer: COMMERCIAL

## 2022-05-09 DIAGNOSIS — Z01.812 ENCOUNTER FOR PREOPERATIVE SCREENING LABORATORY TESTING FOR COVID-19 VIRUS: Primary | ICD-10-CM

## 2022-05-09 DIAGNOSIS — Z11.52 ENCOUNTER FOR PREOPERATIVE SCREENING LABORATORY TESTING FOR COVID-19 VIRUS: Primary | ICD-10-CM

## 2022-05-09 NOTE — TELEPHONE ENCOUNTER
Type of surgery: single site lso, right salpingectomy  Location of surgery: [facility]  Date and time of surgery: 6/15/22 @ 11:15 am  Surgeon: Dr. Yarbrough  Pre-Op Appt Date: Patient advised to schedule.  Post-Op Appt Date: 6/23/22   Packet sent out: Yes  Pre-cert/Authorization completed:  No  Date: 5/9/22

## 2022-06-07 ENCOUNTER — MYC MEDICAL ADVICE (OUTPATIENT)
Dept: OBGYN | Facility: CLINIC | Age: 57
End: 2022-06-07
Payer: COMMERCIAL

## 2022-06-08 ENCOUNTER — OFFICE VISIT (OUTPATIENT)
Dept: INTERNAL MEDICINE | Facility: CLINIC | Age: 57
End: 2022-06-08
Payer: COMMERCIAL

## 2022-06-08 VITALS
HEART RATE: 68 BPM | OXYGEN SATURATION: 100 % | TEMPERATURE: 97.5 F | HEIGHT: 67 IN | BODY MASS INDEX: 25.9 KG/M2 | RESPIRATION RATE: 24 BRPM | WEIGHT: 165 LBS | DIASTOLIC BLOOD PRESSURE: 70 MMHG | SYSTOLIC BLOOD PRESSURE: 116 MMHG

## 2022-06-08 DIAGNOSIS — N83.202 CYST OF LEFT OVARY: ICD-10-CM

## 2022-06-08 DIAGNOSIS — E78.2 MIXED HYPERLIPIDEMIA: ICD-10-CM

## 2022-06-08 DIAGNOSIS — Z01.818 PREOP GENERAL PHYSICAL EXAM: Primary | ICD-10-CM

## 2022-06-08 LAB — HGB BLD-MCNC: 14.9 G/DL (ref 11.7–15.7)

## 2022-06-08 PROCEDURE — 85018 HEMOGLOBIN: CPT | Performed by: INTERNAL MEDICINE

## 2022-06-08 PROCEDURE — 99214 OFFICE O/P EST MOD 30 MIN: CPT | Performed by: INTERNAL MEDICINE

## 2022-06-08 PROCEDURE — 36415 COLL VENOUS BLD VENIPUNCTURE: CPT | Performed by: INTERNAL MEDICINE

## 2022-06-08 PROCEDURE — 80053 COMPREHEN METABOLIC PANEL: CPT | Performed by: INTERNAL MEDICINE

## 2022-06-08 PROCEDURE — 93000 ELECTROCARDIOGRAM COMPLETE: CPT | Performed by: INTERNAL MEDICINE

## 2022-06-08 PROCEDURE — 80061 LIPID PANEL: CPT | Performed by: INTERNAL MEDICINE

## 2022-06-08 RX ORDER — ATORVASTATIN CALCIUM 20 MG/1
10 TABLET, FILM COATED ORAL DAILY
Qty: 90 TABLET | Refills: 0 | COMMUNITY
Start: 2022-06-08 | End: 2022-10-12

## 2022-06-08 NOTE — NURSING NOTE
"Chief Complaint   Patient presents with     Pre-Op Exam     initial /70   Pulse 68   Temp 97.5  F (36.4  C) (Oral)   Resp 24   Ht 1.708 m (5' 7.25\")   Wt 74.8 kg (165 lb)   SpO2 100%   BMI 25.65 kg/m   Estimated body mass index is 25.65 kg/m  as calculated from the following:    Height as of this encounter: 1.708 m (5' 7.25\").    Weight as of this encounter: 74.8 kg (165 lb)..  bp completed using cuff size large  MAXIM HANNA LPN  "

## 2022-06-08 NOTE — PROGRESS NOTES
[place] [address] [address] [facility] [address]  Phone: [phone]  Primary Provider: Ronaldo Pacheco  Pre-op Performing Provider: RONALDO PACHECO      PREOPERATIVE EVALUATION:  Today's date: 6/8/2022    Shanna Sutton is a 56 year old female who presents for a preoperative evaluation.    Surgical Information:  Surgery/Procedure: unilateral Lt salpingo-oophorectomy  Surgery Location: [facility]  Surgeon: Zenon  Surgery Date: 6-15-22  Time of Surgery: 11:15 am  Where patient plans to recover: At home with family  Fax number for surgical facility: Note does not need to be faxed, will be available electronically in Epic.    Type of Anesthesia Anticipated: to be determined    Assessment & Plan     The proposed surgical procedure is considered LOW risk.    (Z01.818) Preop general physical exam  (primary encounter diagnosis)  Comment: unilateral Lt alpingo-oophorectomy for lt ovarian cyst   Plan: EKG 12-lead complete w/read - Clinics,  Hemoglobin,        Comprehensive metabolic panel             (N83.202) Cyst of left ovary  Plan: unilateral Lt alpingo-oophorectomy for lt ovarian cyst     (E78.2) Mixed hyperlipidemia  Plan: check Lipid panel reflex to direct LDL Fasting, atorvastatin (LIPITOR) 20 MG tablet refilled .explained clearly about the medication,insructions and side effects.        Risks and Recommendations:  The patient has the following additional risks and recommendations for perioperative complications:   - No identified additional risk factors other than previously addressed    Medication Instructions:   - aspirin: Discontinue aspirin 7-  days prior to procedure to reduce bleeding risk.     - Statins: Continue taking on the day of surgery.    - NSAID's: HOLD 3 days before surgery.     RECOMMENDATION:  APPROVAL GIVEN to proceed with proposed procedure, without further diagnostic evaluation.    Review of the result(s) of each unique test - HGB,  CMP,lipids        Subjective     HPI related to upcoming procedure: unilateral Lt alpingo-oophorectomy for lt ovarian cyst     Preop Questions 6/7/2022   1. Have you ever had a heart attack or stroke? No   2. Have you ever had surgery on your heart or blood vessels, such as a stent placement, a coronary artery bypass, or surgery on an artery in your head, neck, heart, or legs? No   3. Do you have chest pain with activity? No   4. Do you have a history of  heart failure? No   5. Do you currently have a cold, bronchitis or symptoms of other infection? No   6. Do you have a cough, shortness of breath, or wheezing? No   7. Do you or anyone in your family have previous history of blood clots? No   8. Do you or does anyone in your family have a serious bleeding problem such as prolonged bleeding following surgeries or cuts? No   9. Have you ever had problems with anemia or been told to take iron pills? No   10. Have you had any abnormal blood loss such as black, tarry or bloody stools, or abnormal vaginal bleeding? No   11. Have you ever had a blood transfusion? No   12. Are you willing to have a blood transfusion if it is medically needed before, during, or after your surgery? Yes   13. Have you or any of your relatives ever had problems with anesthesia? No   14. Do you have sleep apnea, excessive snoring or daytime drowsiness? YES -snoring    14a. Do you have a CPAP machine? No   15. Do you have any artifical heart valves or other implanted medical devices like a pacemaker, defibrillator, or continuous glucose monitor? No   16. Do you have artificial joints? No   17. Are you allergic to latex? No   18. Is there any chance that you may be pregnant? No        Status of Chronic Conditions:  HYPERLIPIDEMIA - Patient has a long history of Hyperlipidemia requiring medication for treatment with recent fair control. Patient reports no problems or side effects with the medication.       Review of Systems  CONSTITUTIONAL:  NEGATIVE for fever, chills, change in weight  EYES: NEGATIVE for vision changes or irritation  ENT/MOUTH: NEGATIVE for ear, mouth and throat problems  RESP: NEGATIVE for significant cough or SOB  CV: NEGATIVE for chest pain, palpitations or peripheral edema  GI: NEGATIVE for nausea, abdominal pain, heartburn, or change in bowel habits  : NEGATIVE for frequency, dysuria, or hematuria  MUSCULOSKELETAL: NEGATIVE for significant arthralgias or myalgia  NEURO: NEGATIVE for weakness, dizziness or paresthesias  ENDOCRINE: NEGATIVE for temperature intolerance, skin/hair changes  HEME: NEGATIVE for bleeding problems  PSYCHIATRIC: NEGATIVE for changes in mood or affect    Patient Active Problem List    Diagnosis Date Noted     Intrauterine contraceptive device threads lost, initial encounter 12/16/2019     Priority: Medium     DUB (dysfunctional uterine bleeding) 12/16/2019     Priority: Medium     Mirena IUD insertion 12/5/2017 - remove on or before 12/5/2022 12/05/2017     Priority: Medium     Gastroesophageal reflux disease, esophagitis presence not specified 09/11/2017     Priority: Medium     IMO Regulatory Load OCT 2020       Family history of colon cancer 11/05/2014     Priority: Medium     HYPERLIPIDEMIA LDL GOAL <130 10/31/2010     Priority: Medium     Papanicolaou smear of cervix with atypical squamous cells cannot exclude high grade squamous intraepithelial lesion (ASC-H) 08/21/2007     Priority: Medium     8/21/07 ASCUS pap - HPV  11/20/09 ASCUUS pap - HPV  2/14/11 ASCUS pap - HPV  3/2/11 WNL colposcopy  4/18/12 NIL dx pap to MD for review.  11/5/14 NIL pap, Neg HPV.  12/16/19 NIL pap, Neg HPV.        Benign neoplasm of skin      Priority: Medium     Problem list name updated by automated process. Provider to review       Mixed hyperlipidemia 08/03/2005     Priority: Medium      Past Medical History:   Diagnosis Date     ASCUS on Pap smear 8/21/07, 11/20/09, 2/14/11    - HPV, - HPV, -HPV     Benign neoplasm of  skin, site unspecified      Family history of colon cancer      Family history of colon cancer      History of colposcopy with cervical biopsy 3/1/11    WNL     Infectious mononucleosis      Mixed hyperlipidemia      Slow transit constipation     irritable bowel syndrome  abstracted [id number]     Past Surgical History:   Procedure Laterality Date     BREAST SURGERY  1/11/2017    prophylactic double mastectomy     Mastectomy Bilateral 01/11/2017    prophylactic double mastectomy: 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, 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 (Patient not taking: No sig reported) 30 g 3     multivitamin w/minerals (THERA-VIT-M) tablet Take 1 tablet by mouth daily       omeprazole (PRILOSEC) 40 MG DR capsule TAKE 1 CAPSULE (40 MG) BY MOUTH DAILY TAKE 30-60 MINUTES BEFORE A MEAL. 90 capsule 1     Vitamin D3 (CHOLECALCIFEROL) 25 mcg (1000 units) tablet Take by mouth daily         Allergies   Allergen Reactions     Amoxicillin Hives        Social History     Tobacco Use     Smoking status: Never Smoker     Smokeless tobacco: Never Used   Substance Use Topics     Alcohol use: Yes     Alcohol/week: 0.0 standard drinks     Comment: Rarely     Family History   Problem Relation Age of Onset     Breast Cancer Mother         age 65     Cancer - colorectal Mother         age 73     Colon Cancer Mother      Respiratory Father         COPD     Skin Cancer Father      Family History Negative Brother         6-healthy     Family History Negative Sister         5-healthy     Breast Cancer Sister      Breast Cancer Sister         diagnosed at age 43     Thyroid Disease Sister         3 sisters     Breast Cancer Sister         diagnosed at age 58     Breast Cancer Sister      Thyroid Disease  "Sister      History   Drug Use No         Objective     /70   Pulse 68   Temp 97.5  F (36.4  C) (Oral)   Resp 24   Ht 1.708 m (5' 7.25\")   Wt 74.8 kg (165 lb)   SpO2 100%   BMI 25.65 kg/m      Physical Exam    GENERAL APPEARANCE: healthy, alert and no distress     EYES: EOMI, PERRL     NECK: no adenopathy, no asymmetry, masses, or scars and thyroid normal to palpation     RESP: lungs clear to auscultation - no rales, rhonchi or wheezes     CV: regular rates and rhythm, normal S1 S2,      ABDOMEN:  soft, nontender, no HSM or masses and bowel sounds normal     MS: no edema, no calf tenderness     NEURO: Normal strength and tone, sensory exam grossly normal, mentation intact and speech normal     PSYCH: mentation appears normal. and affect normal/bright     LYMPHATICS: No cervical adenopathy    Recent Labs   Lab Test 09/29/21  0814      POTASSIUM 4.4   CR 0.88        Diagnostics:  Recent Results (from the past 168 hour(s))   Lipid panel reflex to direct LDL Fasting    Collection Time: 06/08/22 10:28 AM   Result Value Ref Range    Cholesterol 217 (H) <200 mg/dL    Triglycerides 227 (H) <150 mg/dL    Direct Measure HDL 56 >=50 mg/dL    LDL Cholesterol Calculated 116 (H) <=100 mg/dL    Non HDL Cholesterol 161 (H) <130 mg/dL    Patient Fasting > 8hrs? No    Hemoglobin    Collection Time: 06/08/22 10:28 AM   Result Value Ref Range    Hemoglobin 14.9 11.7 - 15.7 g/dL   Comprehensive metabolic panel    Collection Time: 06/08/22 10:28 AM   Result Value Ref Range    Sodium 142 133 - 144 mmol/L    Potassium 4.5 3.4 - 5.3 mmol/L    Chloride 109 94 - 109 mmol/L    Carbon Dioxide (CO2) 32 20 - 32 mmol/L    Anion Gap 1 (L) 3 - 14 mmol/L    Urea Nitrogen 19 7 - 30 mg/dL    Creatinine 0.85 0.52 - 1.04 mg/dL    Calcium 9.8 8.5 - 10.1 mg/dL    Glucose 75 70 - 99 mg/dL    Alkaline Phosphatase 157 (H) 40 - 150 U/L    AST 42 0 - 45 U/L    ALT 86 (H) 0 - 50 U/L    Protein Total 7.7 6.8 - 8.8 g/dL    Albumin 3.8 3.4 - 5.0 " g/dL    Bilirubin Total 0.4 0.2 - 1.3 mg/dL    GFR Estimate 81 >60 mL/min/1.73m2      EKG: Sinus rhythm, no acute ST/T changes c/w ischemia, no LVH by voltage criteria,        Revised Cardiac Risk Index (RCRI):  The patient has the following serious cardiovascular risks for perioperative complications:   - No serious cardiac risks = 0 points          Signed Electronically by: Lexi Forrester MD  Copy of this evaluation report is provided to requesting physician.

## 2022-06-09 LAB
ALBUMIN SERPL-MCNC: 3.8 G/DL (ref 3.4–5)
ALP SERPL-CCNC: 157 U/L (ref 40–150)
ALT SERPL W P-5'-P-CCNC: 86 U/L (ref 0–50)
ANION GAP SERPL CALCULATED.3IONS-SCNC: 1 MMOL/L (ref 3–14)
AST SERPL W P-5'-P-CCNC: 42 U/L (ref 0–45)
BILIRUB SERPL-MCNC: 0.4 MG/DL (ref 0.2–1.3)
BUN SERPL-MCNC: 19 MG/DL (ref 7–30)
CALCIUM SERPL-MCNC: 9.8 MG/DL (ref 8.5–10.1)
CHLORIDE BLD-SCNC: 109 MMOL/L (ref 94–109)
CHOLEST SERPL-MCNC: 217 MG/DL
CO2 SERPL-SCNC: 32 MMOL/L (ref 20–32)
CREAT SERPL-MCNC: 0.85 MG/DL (ref 0.52–1.04)
FASTING STATUS PATIENT QL REPORTED: NO
GFR SERPL CREATININE-BSD FRML MDRD: 81 ML/MIN/1.73M2
GLUCOSE BLD-MCNC: 75 MG/DL (ref 70–99)
HDLC SERPL-MCNC: 56 MG/DL
LDLC SERPL CALC-MCNC: 116 MG/DL
NONHDLC SERPL-MCNC: 161 MG/DL
POTASSIUM BLD-SCNC: 4.5 MMOL/L (ref 3.4–5.3)
PROT SERPL-MCNC: 7.7 G/DL (ref 6.8–8.8)
SODIUM SERPL-SCNC: 142 MMOL/L (ref 133–144)
TRIGL SERPL-MCNC: 227 MG/DL

## 2022-06-15 ENCOUNTER — LAB REQUISITION (OUTPATIENT)
Dept: LAB | Facility: CLINIC | Age: 57
End: 2022-06-15
Payer: COMMERCIAL

## 2022-06-15 ENCOUNTER — TRANSFERRED RECORDS (OUTPATIENT)
Dept: HEALTH INFORMATION MANAGEMENT | Facility: CLINIC | Age: 57
End: 2022-06-15

## 2022-06-15 PROCEDURE — 88305 TISSUE EXAM BY PATHOLOGIST: CPT | Mod: 26 | Performed by: PATHOLOGY

## 2022-06-15 PROCEDURE — 88112 CYTOPATH CELL ENHANCE TECH: CPT | Mod: 26 | Performed by: PATHOLOGY

## 2022-06-15 PROCEDURE — 88112 CYTOPATH CELL ENHANCE TECH: CPT | Mod: TC,ORL | Performed by: OBSTETRICS & GYNECOLOGY

## 2022-06-15 PROCEDURE — 88305 TISSUE EXAM BY PATHOLOGIST: CPT | Mod: TC,ORL | Performed by: OBSTETRICS & GYNECOLOGY

## 2022-06-16 ENCOUNTER — LAB REQUISITION (OUTPATIENT)
Dept: LAB | Facility: CLINIC | Age: 57
End: 2022-06-16
Payer: COMMERCIAL

## 2022-06-17 LAB
PATH REPORT.COMMENTS IMP SPEC: NORMAL
PATH REPORT.FINAL DX SPEC: NORMAL
PATH REPORT.FINAL DX SPEC: NORMAL
PATH REPORT.GROSS SPEC: NORMAL
PATH REPORT.GROSS SPEC: NORMAL
PATH REPORT.MICROSCOPIC SPEC OTHER STN: NORMAL
PATH REPORT.MICROSCOPIC SPEC OTHER STN: NORMAL
PATH REPORT.RELEVANT HX SPEC: NORMAL
PATH REPORT.RELEVANT HX SPEC: NORMAL
PHOTO IMAGE: NORMAL

## 2022-06-23 ENCOUNTER — OFFICE VISIT (OUTPATIENT)
Dept: OBGYN | Facility: CLINIC | Age: 57
End: 2022-06-23
Payer: COMMERCIAL

## 2022-06-23 VITALS — WEIGHT: 165 LBS | DIASTOLIC BLOOD PRESSURE: 68 MMHG | BODY MASS INDEX: 25.65 KG/M2 | SYSTOLIC BLOOD PRESSURE: 110 MMHG

## 2022-06-23 DIAGNOSIS — N90.4 LICHEN SCLEROSUS ET ATROPHICUS OF THE VULVA: Primary | ICD-10-CM

## 2022-06-23 DIAGNOSIS — D27.1 SEROUS CYSTADENOMA OF LEFT OVARY: ICD-10-CM

## 2022-06-23 PROCEDURE — 99214 OFFICE O/P EST MOD 30 MIN: CPT | Performed by: OBSTETRICS & GYNECOLOGY

## 2022-06-23 RX ORDER — CLOBETASOL PROPIONATE 0.5 MG/G
OINTMENT TOPICAL
Qty: 60 G | Refills: 1 | Status: SHIPPED | OUTPATIENT
Start: 2022-06-23

## 2022-06-23 NOTE — NURSING NOTE
"Chief Complaint   Patient presents with     Surgical Followup     Reports feeling well.        Initial /68   Wt 74.8 kg (165 lb)   Breastfeeding No   BMI 25.65 kg/m   Estimated body mass index is 25.65 kg/m  as calculated from the following:    Height as of 22: 1.708 m (5' 7.25\").    Weight as of this encounter: 74.8 kg (165 lb).  BP completed using cuff size: regular    Questioned patient about current smoking habits.  Pt. has never smoked.          The following HM Due: NONE      The following patient reported/Care Every where data was sent to:  P ABSTRACT QUALITY INITIATIVES [[id number]]  Hoa Worrell LPN                "

## 2022-06-24 NOTE — PATIENT INSTRUCTIONS
Understanding Lichen Sclerosus  Lichen sclerosus is a long-term (chronic) skin condition. It causes white patches to form on the body. These can appear anywhere, even in the mouth. They most often affect skin around the genitals. The condition is more common in older women and young girls. It also tends to occur in men who are not circumcised.  How to say it  KELLY-yahaira pottergorew-FTN-sly   What causes lichen sclerosus?  Experts don t yet know exactly what causes lichen sclerosus. It may be an autoimmune disease. That s when the immune system attacks healthy parts of the body, such as the skin. It may also be linked to genetics, hormones, or some infections.  Symptoms of lichen sclerosus  Lichen sclerosus causes white patches on the skin. These patches break down the skin. The skin may become thin, wrinkled, and cracked. It can be itchy and painful. The patches may scar, discolor, and disfigure the skin. These changes can damage the skin. They are often found on the back, shoulders, neck, wrist, thigh, and breast areas. They may also be found on the lips or in the mouth. They can also appear around the vagina and on the penis. That may lead to problems having sex and using the bathroom.  Treatment for lichen sclerosus  Treatment can ease symptoms and prevent scarring. It should be started early to avoid lasting (permanent) damage to the skin. Treatment options include:  Steroids. These medicines are often put on the skin as an ointment. Very strong steroid ointments are used. Your provider may also inject steroids into the white patches in severe cases that don't respond to topical treatments.  Other medicines. An oral medicine (antihistamine) may be given to ease itching. Other creams or ointments are also available if a steroid doesn t work.  General care of the vulva: Chemicals found in toilet tissues, laundry soaps and detergents that come in contact with the vulva can cause irritation.  Avoiding contact with potential  irritants that contain chemicals is important.  Fabric softeners in undergarments, chemicals in deodorant soaps, bubble baths, feminine hygiene spray and panty liners, etc., can all cause irritation to the vulva. At the end of the document are some recommendations for specific measures that can help minimize vulvar irritation.    Possible complications of lichen sclerosus   Skin cancer of the genitals. Your risk for vulvar cancer is slightly increased with this condition, though still low overall. I recommend yearly examination of the vulva by a gynecologist even if your skin condition is under excellent control and you are feeling well.    When to call your healthcare provider  Call your healthcare provider right away if you have any of these:  Fever of 100.4 F (38 C) or higher, or as directed  Redness, swelling, or fluid leaking from your incision that gets worse  Pain that gets worse  Symptoms that don t get better, or get worse  New symptoms        Some Suggested Vulvar Pain & Itching Measures    The vulva is the external genitalia in the female.  The skin of the vulva can be quite sensitive.  Because it is moist and frequently subjected to friction while sitting and moving, this area can be easily injured.  There are various strategies that can be used to prevent irritation and allow the vulva to heal.  Keeping this area dry can accelerate healing.  Chemicals found in toilet tissues, laundry soaps and detergents that come in contact with the vulva can cause irritation.  Avoiding contact with potential irritants that contain chemicals is important.  Fabric softeners in undergarments, chemicals in deodorant soaps, bubble baths, feminine hygiene spray and panty liners, etc., can all cause irritation to the vulva.  The following recommendations are specific measures that can help minimize vulvar irritation.    Wear white 100% cotton underwear and do not wear underwear at night.  Do not wear pantyhose, tights, or  "other close-fitting clothes.  Enclosing this area with synthetic fibers holds both heat and moisture in the skin, conditions which potentiate the development of infections.  Tight-fitting clothes may also increase your symptoms of discomfort.    After washing underwear, put it through at least one whole cycle with water only.  Some women have suffered needlessly from irritants in detergents whose residue was left in clothes by incomplete rinsing.  Rinsing clothes thoroughly is more important than which detergent is used although to be on the safe side, the milder the soap, the better.  Wash new underwear before wearing.  Fabric softeners and dryer sheets should not be used.    Rinse skin off with plain water frequently.  Use tap water, distilled water, sitz baths, squirt bottles, or bidets.  Additionally, hand held showers can be helpful for rinsing the vulva.  After rinsing, pat the skin gently dry. It is okay to then apply a thin layer of plain petroleum jelly (Vasoline) if this helps with discomfort. This is often referred to as the \"soak and seal\" method.    Use very mild soap for bathing.  It is best not to use any soaps on the vulva.  The vulva should be rinsed with warm water.  Bars of soap such as Dove or Basis are gentle to the other skin areas.  If you must use a cleanser on the vulva, I suggest Cetaphil or Cerave sensitive skin facial cleanser. Remember that frequent baths with soaps may increase the irritation.  You cannot wash away your symptoms.    Some patients find the use of cool gel packs on the vulva to be helpful. These can be kept in the refrigerator or the freezer and reused as needed.    Do not use products with benzocaine (ie, Vagisil).  In general, do not use any products over the counter that you haven't discussed with your provider, as many products that are marketed for the vulvar area can actually make things worse.    Consider using 100% cotton menstrual pads and tampons.  Many women " "with vulvar pain experience a significant increase in irritation and pain every month when they use commercial paper pads or tampons.  Some women will use a menstrual cup or washable absorbant underwear when they have have their period. This monthly increase in pain can often be reduced by using 100% washable and reusable cotton menstrual pads.  Pure cotton tampons are available.    Don't sit or remain in a wet bathing suit for prolonged periods. Change underwear shortly after exercising as well.    For everyday relief of irritation or fissures, if recommended:  Rinse skin off with plain water frequently.  Use tap water, distilled water, sitz baths, squirt bottles, or bidets.  Additionally, hand held showers can be helpful for rinsing the vulva.  After rinsing, pat the skin gently dry. It is okay to then apply a thin layer of plain petroleum jelly (Vasoline) if this helps with discomfort. This is often referred to as the \"soak and seal\" method.      Adapted From:  The Vulvar Pain Foundation, \"Natural and Prophylactic Measures Suggested\", Vulvar Pain   Newsletter : Sprin-6  The Interstitial Cystitis Association Vulvar Pain handout    Date Last Reviewed: 2016-2017 [employer]. [address] [place] [address], [place] [address]. All rights reserved. This information is not intended as a substitute for professional medical care. Always follow your healthcare professional's instructions.     Vaginal Atrophy and Sex after Menopause    Vaginal atrophy is a normal part of the aging process and is a reaction to decreased circulating estrogen, but this doesn't mean that there is nothing we can do about it. Symptoms often include vaginal dryness, itching, pain with intercourse, and narrowing of the vagina.     For dryness you can start by trying an over the counter vaginal moisturizer. For vaginal or vulvar moisturizers, I recommend something with hyaluronic acid. Good options are available online " (suppository for inside the vagina: Revaree, https://hellobonafide.com/) or Hyalo-gyn (gel for outside the vagina, https://hyalogyn.com/). These can be used together.     Try a silicone or oil based lubricant for intercourse to make it more comfortable.    Lubricants can be used to relieve vaginal dryness during intercourse.  There are so many options available! You may choose a lubricant that is water or silicone based, or you may choose to use an oil instead. If you use a water based lubricant, use one that does not change the pH of the vagina (a good option is Good Clean Love). You want to use something that does not contain glycerin, which can increase the risk for yeast infections. Lubricants are almost always a good idea for intercourse in women over 40, as they will lead to more enjoyable and less painful intercourse. Some good options are: Good Clean Love brands (water based), UberLube or Silk silicone based lubricants, or inert oils like coconut, olive, etc. You should not use oil based lubricants with condoms, or silicone based lubricants with silicone sex toys.    You can purchase lubricants at [employer], or [employer] if they are hard to find elsewhere. This is a common problem that is receiving more attention, thankfully, and so most drugstores will carry these options now.    Read about sexual issues after menopause at www.Insightpool.Snagsta.     Vaginal estrogen is a medication inserted into the vagina which can decrease dryness, itching, narrowing, and pain with intercourse. It is administered in very low doses and is locally active. The risks associated with vaginal estrogen are far fewer and less significant than those associated with systemic hormone replacement therapy.    You may need to contact your insurance company to determine which vaginal estrogen formulation is covered at the best rate. This is different for each insurance company and for each type of supplemental coverage. Options  include the following:    - Vagifem tablet or Imvexxy insert, inserted vaginally daily for 14 days then 2-3x weekly  - Estring, a ring placed inside the vagina that rests at the top of the vagina for 3 months  - Estrace cream, a cream that is applied daily for 14 days then 2x/week inside the vagina. Apply 0.5g.  - Premarin cream, a cream that is applied daily for 14 days then 2x/week inside the vagina. Apply 0.5g      [facility] can be a great resource for finding ways to cope with changing sexual desire and satisfaction.   - Address: [address] [address]     - Phone: [phone]

## 2022-06-24 NOTE — PROGRESS NOTES
Here for postop exam, status post single site laparoscopic left salpingo-oophorectomy, right salpingectomy, and hysteroscopy with hysteroscopic myomectomy.    Feeling better this week, with less soreness.  Light vaginal bleeding, nothing heavy.  Feels like her energy level is getting back to normal.    We reviewed pathology in detail.  All pathology was benign, and the cyst on the left ovary was a serous cystadenoma.  We reviewed that this type of tumor can grow quite large, but is benign.  There is a small possibility that she could get 1 in the future on the right ovary, which we discussed.  There is no reason that she needs further screening or follow-up for this, but if she develops pain in the future she should let us know.    She notes that in the past, she thinks around 2019 or 2020, she was given a prescription to help with the pain she was having in the posterior fourchette during intercourse.  She wondered if I could renew that for her.  From her past medication list, it looks like this was clobetasol.  Therefore I asked her to get undressed today so that I could examine the vulva to determine why she was prescribed clobetasol.  She ended up not picking up the prescription at all and has not used it, and is noticing that she continues to have pain with intercourse in the same area.    Exam:    /68   Wt 74.8 kg (165 lb)   Breastfeeding No   BMI 25.65 kg/m    Abdomen: Soft, nontender.  Incision is clean, dry, intact and healing well.  Steri-Strips were removed.  Pelvis: EGBUS notable for signs of lichen sclerosis, including thin, white skin, scarring and fissuring of the interlabial sulci, and scarring and fissuring with thin, papery skin on the perineal body.  This last area is the area where she notices pain with intercourse.    Impression:    1.  Normal postop exam.  Reviewed lifting restrictions for an additional week, do not soak the incision in a pool or lake for 1 more week, but then no  restrictions at that point.  2.  Lichen sclerosis: Today we discussed that lichen sclerosus is a chronic, remitting, and relapsing skin condition that affects the vulvar skin but may also affect other areas of the body. It is a relatively common cause of vulvar itching, irritation, and loss of normal architecture. It is also a frequent cause of painful intercourse. The mainstay of therapy is high potency steroids, used frequently for initial treatment and for relapses, and used twice a week chronically for suppression of symptoms. Because the presence of lichen sclerosus increases the risk of vulvar cancer, she needs an exam of the vulva at least once a year, and perhaps more often as her symptoms warrant. Written information about lichen sclerosus was given to her outlining these instructions.    I would like her to use clobetasol ointment in a thin layer applied to the vulva twice daily for 4 weeks and then to see me back. I will likely have her use it for an additional 4 weeks on a daily basis, and then we can discuss decreasing to twice a week for maintenance, with plans to treat any recurrence of symptoms by increasing to twice daily again for 10 days.    Vulvar hygiene measures discussed as well.      Information added to AVS for her.  Try a lubricant as well.  Follow up in 4-6 weeks.    Nikole Yarbrough MD  [facility]

## 2022-08-02 ENCOUNTER — OFFICE VISIT (OUTPATIENT)
Dept: OBGYN | Facility: CLINIC | Age: 57
End: 2022-08-02
Payer: COMMERCIAL

## 2022-08-02 VITALS
WEIGHT: 165 LBS | HEIGHT: 67 IN | BODY MASS INDEX: 25.9 KG/M2 | SYSTOLIC BLOOD PRESSURE: 112 MMHG | DIASTOLIC BLOOD PRESSURE: 76 MMHG

## 2022-08-02 DIAGNOSIS — N90.4 LICHEN SCLEROSUS ET ATROPHICUS OF THE VULVA: Primary | ICD-10-CM

## 2022-08-02 PROCEDURE — 99213 OFFICE O/P EST LOW 20 MIN: CPT | Performed by: OBSTETRICS & GYNECOLOGY

## 2022-08-02 NOTE — PATIENT INSTRUCTIONS
I recommend using the clobetasol ointment DAILY for an additional 4 weeks, and then we will decrease to twice a week to prevent a recurrence of the lichen sclerosus. In either case, if your symptoms return, I would ask you to increase the use of the ointment again to twice a day for 7-10 days, then decrease back to twice a week. Please let us know if this happens, since we will need to see you back if increasing the frequency of using the ointment doesn't help your symptoms.    Some Suggested Vulvar Pain & Itching Measures    The vulva is the external genitalia in the female.  The skin of the vulva can be quite sensitive.  Because it is moist and frequently subjected to friction while sitting and moving, this area can be easily injured.  There are various strategies that can be used to prevent irritation and allow the vulva to heal.  Keeping this area dry can accelerate healing.  Chemicals found in toilet tissues, laundry soaps and detergents that come in contact with the vulva can cause irritation.  Avoiding contact with potential irritants that contain chemicals is important.  Fabric softeners in undergarments, chemicals in deodorant soaps, bubble baths, feminine hygiene spray and panty liners, etc., can all cause irritation to the vulva.  The following recommendations are specific measures that can help minimize vulvar irritation.    Wear white 100% cotton underwear and do not wear underwear at night.  Do not wear pantyhose, tights, or other close-fitting clothes.  Enclosing this area with synthetic fibers holds both heat and moisture in the skin, conditions which potentiate the development of infections.  Tight-fitting clothes may also increase your symptoms of discomfort.    After washing underwear, put it through at least one whole cycle with water only.  Some women have suffered needlessly from irritants in detergents whose residue was left in clothes by incomplete rinsing.  Rinsing clothes thoroughly is more  "important than which detergent is used although to be on the safe side, the milder the soap, the better.  Wash new underwear before wearing.  Fabric softeners and dryer sheets should not be used.    Rinse skin off with plain water frequently.  Use tap water, distilled water, sitz baths, squirt bottles, or bidets.  Additionally, hand held showers can be helpful for rinsing the vulva.  After rinsing, pat the skin gently dry.    Use very mild soap for bathing.  It is best not to use any soaps on the vulva.  The vulva should be rinsed with warm water.  Bars of soap such as Dove or Basis are gentle to the other skin areas.  If you feel that you must use a cleanser on the vulvar skin, I recommend Cetaphil or Cerave sensitive skin facial cleanser. Remember that frequent baths with soaps may increase the irritation.  You cannot wash away your symptoms.    Some patients find the use of cool gel packs on the vulva to be helpful.    Do not use products with benzocaine.    Don't sit or remain in a wet bathing suit for prolonged periods. Change underwear shortly after exercising as well.        Adapted From:  The Vulvar Pain Foundation, \"Natural and Prophylactic Measures Suggested\", Vulvar Pain   Newsletter 1993: Sprin-6  The Interstitial Cystitis Association Vulvar Pain handout       "

## 2022-08-02 NOTE — PROGRESS NOTES
SUBJECTIVE:                                                   Shanna Sutton is a 57 year old female who presents to clinic today to follow up for lichen sclerosus. Please see my last note for complete details.    Currently using clobetasol daily. Symptoms are mostly gone, but not 100%. Feeling overall much better.      Problem list and histories reviewed & adjusted, as indicated.  Additional history: as documented.    Patient Active Problem List   Diagnosis     Mixed hyperlipidemia     Benign neoplasm of skin     HYPERLIPIDEMIA LDL GOAL <130     Papanicolaou smear of cervix with atypical squamous cells cannot exclude high grade squamous intraepithelial lesion (ASC-H)     Family history of colon cancer     Gastroesophageal reflux disease, esophagitis presence not specified     Mirena IUD insertion 12/5/2017 - remove on or before 12/5/2022     Intrauterine contraceptive device threads lost, initial encounter     DUB (dysfunctional uterine bleeding)     Past Surgical History:   Procedure Laterality Date     BREAST SURGERY  1/11/2017    prophylactic double mastectomy     Mastectomy Bilateral 01/11/2017    prophylactic double mastectomy: 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, 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      Social History     Tobacco Use     Smoking status: Never Smoker     Smokeless tobacco: Never Used   Substance Use Topics     Alcohol use: Yes     Comment: Rarely      Problem (# of Occurrences) Relation (Name,Age of Onset)    Breast Cancer (5) Mother (Nadia): age 65, Sister (Alma), Sister (Dai): diagnosed at age 43, Sister (Socorro): diagnosed at age 58, Sister (Patricia)    Cancer - colorectal (1) Mother (Nadia): age 73    Colon Cancer (1) Mother (Nadia)    Family History Negative (2) Brother: 6-healthy, Sister (Alma): 5-healthy    Respiratory (1) Father: COPD    Skin Cancer (1) Father    Thyroid Disease (2) Sister (Erin): 3 sisters, Sister (Patricia)            atorvastatin (LIPITOR) 20 MG tablet, Take 0.5 tablets (10 mg) by mouth  daily  clobetasol (TEMOVATE) 0.05 % external ointment, Apply a thin layer to affected area twice a day for 4 weeks, then twice a week as directed.  multivitamin w/minerals (THERA-VIT-M) tablet, Take 1 tablet by mouth daily  omeprazole (PRILOSEC) 40 MG DR capsule, TAKE 1 CAPSULE (40 MG) BY MOUTH DAILY TAKE 30-60 MINUTES BEFORE A MEAL.  Vitamin D3 (CHOLECALCIFEROL) 25 mcg (1000 units) tablet, Take by mouth daily    No current facility-administered medications on file prior to visit.    Allergies   Allergen Reactions     Amoxicillin Hives       ROS:  Negative except as noted in HPI.    OBJECTIVE:     Mons pubis Normal   Groin Normal   Labia majora Normal   Perineum Normal   Anus Normal   Labia minora Abnormal: attenuated, with fissures (healed) noted in the folds.   Prepuce Abnormal: slight scarring, overall appearance improved   Clitoris Normal   Intralabial folds Abnormal: healed fissures noted. Improved appearance overall.   Vestibule Abnormal: slight erythema.   Urethral meatus Normal       In-Clinic Test Results:  No results found for this or any previous visit (from the past 24 hour(s)).    ASSESSMENT/PLAN:                                                        ICD-10-CM    1. Lichen sclerosus et atrophicus of the vulva  N90.4        -Continue clobetasol daily for 4 weeks, then:    -Decrease clobetasol to once daily on 2 days a week.  -For flares, increase clobetasol ointment to bid for 7 days. Follow up if still experiencing symptoms after this. If better, decrease back down to twice a week for maintenance.  -Discussed the need for maintenance dosing of clobetasol to keep symptoms at bay.  -Needs vulvar exam yearly due to increased risk of SCC of the vulva. Regular use of clobetasol to control the progression of lichen sclerosus decreases this risk.    Nikole Yarbrough MD  [facility]

## 2022-08-02 NOTE — NURSING NOTE
"Chief Complaint   Patient presents with     Follow Up     Medication follow up. Using Clobetasol for Lichen Sclerosis - reports improvement.        Initial /76   Ht 1.708 m (5' 7.25\")   Wt 74.8 kg (165 lb)   Breastfeeding No   BMI 25.65 kg/m   Estimated body mass index is 25.65 kg/m  as calculated from the following:    Height as of this encounter: 1.708 m (5' 7.25\").    Weight as of this encounter: 74.8 kg (165 lb).  BP completed using cuff size: regular    Questioned patient about current smoking habits.  Pt. has never smoked.          The following HM Due: NONE      The following patient reported/Care Every where data was sent to:  P ABSTRACT QUALITY INITIATIVES [[id number]]  Hoa Worrell LPN               "

## 2022-10-09 DIAGNOSIS — E78.2 MIXED HYPERLIPIDEMIA: ICD-10-CM

## 2022-10-10 NOTE — TELEPHONE ENCOUNTER
Pending Prescriptions:                       Disp   Refills    atorvastatin (LIPITOR) 20 MG tablet [Phar*90 tab*3            Sig: TAKE 1 TABLET BY MOUTH EVERY DAY    Routing refill request to provider for review/approval because:  Medication is reported/historical - directions listed 1/2 tab daily in chart but pended medication is one tab daily.    Please advise, thanks.

## 2022-10-11 ENCOUNTER — MYC MEDICAL ADVICE (OUTPATIENT)
Dept: INTERNAL MEDICINE | Facility: CLINIC | Age: 57
End: 2022-10-11

## 2022-10-12 RX ORDER — ATORVASTATIN CALCIUM 20 MG/1
10 TABLET, FILM COATED ORAL DAILY
Qty: 45 TABLET | Refills: 3 | Status: SHIPPED | OUTPATIENT
Start: 2022-10-12 | End: 2023-10-30

## 2022-10-12 NOTE — TELEPHONE ENCOUNTER
Patient returns [employer] message stating they take half a tab. Please advise on Rx. Thank you!

## 2022-10-15 ENCOUNTER — HEALTH MAINTENANCE LETTER (OUTPATIENT)
Age: 57
End: 2022-10-15

## 2022-12-13 ENCOUNTER — TRANSFERRED RECORDS (OUTPATIENT)
Dept: HEALTH INFORMATION MANAGEMENT | Facility: CLINIC | Age: 57
End: 2022-12-13

## 2022-12-16 ENCOUNTER — TRANSFERRED RECORDS (OUTPATIENT)
Dept: HEALTH INFORMATION MANAGEMENT | Facility: CLINIC | Age: 57
End: 2022-12-16

## 2023-03-26 ENCOUNTER — HEALTH MAINTENANCE LETTER (OUTPATIENT)
Age: 58
End: 2023-03-26

## 2023-05-25 ENCOUNTER — NURSE TRIAGE (OUTPATIENT)
Dept: INTERNAL MEDICINE | Facility: CLINIC | Age: 58
End: 2023-05-25

## 2023-05-25 ENCOUNTER — VIRTUAL VISIT (OUTPATIENT)
Dept: URGENT CARE | Facility: CLINIC | Age: 58
End: 2023-05-25
Payer: COMMERCIAL

## 2023-05-25 DIAGNOSIS — U07.1 CLINICAL DIAGNOSIS OF COVID-19: Primary | ICD-10-CM

## 2023-05-25 PROCEDURE — 99213 OFFICE O/P EST LOW 20 MIN: CPT | Mod: 93

## 2023-05-25 NOTE — PROGRESS NOTES
"Shanna is a 57 year old who is being evaluated via a billable phone visit.      Tested + this AM.  Sx started this AM 5/25.  First time getting COVID.  COVID vaccinated.    Assessment & Plan     Clinical diagnosis of COVID-19    - nirmatrelvir and ritonavir (PAXLOVID) 300 mg/100 mg therapy pack; Take 3 tablets by mouth 2 times daily for 5 days (Take 2 Nirmatrelvir tablets and 1 Ritonavir tablet twice daily for 5 days)      COVID-19 positive patient.  Encounter for consideration of medication intervention. Patient does qualify for a prescription. Full discussion with patient including medication options, risks and benefits. Potential drug interactions reviewed with patient.     Treatment Planned Paxlovid RX sent to [facility]    Temporary change to home medications:   Hold atorvastatin x 7 days while completing Paxlovid.  May resume day #8.    Estimated body mass index is 25.65 kg/m  as calculated from the following:    Height as of 8/2/22: 1.708 m (5' 7.25\").    Weight as of 8/2/22: 74.8 kg (165 lb).  GFR Estimate   Date Value Ref Range Status   06/08/2022 81 >60 mL/min/1.73m2 Final     Comment:     Effective December 21, 2021 eGFRcr in adults is calculated using the 2021 CKD-EPI creatinine equation which includes age and gender (Raciel et al., NEJ, DOI: 10.1056/GDTKmo6804908)   03/29/2019 82 >60 mL/min/[1.73_m2] Final     Comment:     Non  GFR Calc  Starting 12/18/2018, serum creatinine based estimated GFR (eGFR) will be   calculated using the Chronic Kidney Disease Epidemiology Collaboration   (CKD-EPI) equation.       Laurel Lawson MD  Virtual Urgent Care  [facility] VIRTUAL URGENT CARE    Subjective   Shanna is a 57 year old, presenting for the following health issues:  No chief complaint on file.         View : No data to display.              HPI     Tested + this AM.  Sx started this AM 5/25.  First time getting COVID.  COVID vaccinated.  Hx of fatty liver disease        Review " of Systems   Constitutional, HEENT, cardiovascular, pulmonary, GI, , musculoskeletal, neuro, skin, endocrine and psych systems are negative, except as otherwise noted.      Objective           Vitals:  No vitals were obtained today due to virtual visit.    Physical Exam   GENERAL: Healthy, alert and no distress  PSYCH: mentation appears normal and affect normal/bright    Phone call duration # 9 minutes.

## 2023-05-25 NOTE — TELEPHONE ENCOUNTER
RN COVID TREATMENT VISIT  05/25/23      The patient has been triaged and does not require a higher level of care.    Shanna Sutton  57 year old  Current weight? 163 lbs    Has the patient been seen by a primary care provider at an [facility] or [facility] within the past two years? Yes.   Have you been in close proximity to/do you have a known exposure to a person with a confirmed case of influenza? No.     General treatment eligibility:  Date of positive COVID test (PCR or at home)?  5/25/23    Are you or have you been hospitalized for this COVID-19 infection? No.   Have you received monoclonal antibodies or antiviral treatment for COVID-19 since this positive test? No.   Do you have any of the following conditions that place you at risk of being very sick from COVID-19?   Yes, patient has at least one high risk condition as noted above.     Current COVID symptoms:   Yes. Patient has at least one symptom as selected.     How many days since symptoms started? 5 days or less. Established patient, 12 years or older weighing at least 88.2 lbs, who has symptoms that started in the past 5 days, has not been hospitalized nor received treatment already, and is at risk for being very sick from COVID-19.     Treatment eligibility by RN:    Are you currently pregnant or nursing? No    Do you have a clinically significant hypersensitivity to nirmatrelvir or ritonavir, or toxic epidermal necrolysis (TEN) or Carlton-Edward Syndrome? No    Do you have a history of hepatitis, any hepatic impairment on the Problem List (such as Child-Joseph Class C, cirrhosis, fatty liver disease, alcoholic liver disease), or was the last liver lab (hepatic panel, ALT, AST, ALK Phos, bilirubin) elevated in the past 6 months? YES    Do you have any history of severe renal impairment (eGFR < 30mL/min)? No    Is patient eligible to continue? No, patient does not meet all eligibility requirements for the RN COVID treatment  (as denoted by yes response(s) above). Patient informed they will need a virtual provider visit to assess treatment options.  Patient will be transferred to a  at the end of this call.   Appointments in Next Year    May 25, 2023  9:00 AM  Covid Treatment Visit with  VIRTUAL CARE PROVIDER  [facility] ([facility]  ) [phone]        Seth Babcock RN                Additional Information    Negative: SEVERE difficulty breathing (e.g., struggling for each breath, speaks in single words)    Negative: Difficult to awaken or acting confused (e.g., disoriented, slurred speech)    Negative: Bluish (or gray) lips or face now    Negative: Shock suspected (e.g., cold/pale/clammy skin, too weak to stand, low BP, rapid pulse)    Negative: Sounds like a life-threatening emergency to the triager    Negative: [1] Diagnosed or suspected COVID-19 AND [2] symptoms lasting 3 or more weeks    Negative: [1] COVID-19 exposure AND [2] no symptoms    Negative: COVID-19 vaccine reaction suspected (e.g., fever, headache, muscle aches) occurring 1 to 3 days after getting vaccine    Negative: COVID-19 vaccine, questions about    Negative: [1] Lives with someone known to have influenza (flu test positive) AND [2] flu-like symptoms (e.g., cough, runny nose, sore throat, SOB; with or without fever)    Negative: [1] Adult with possible COVID-19 symptoms AND [2] triager concerned about severity of symptoms or other causes    Negative: COVID-19 and breastfeeding, questions about    Negative: SEVERE or constant chest pain or pressure  (Exception: Mild central chest pain, present only when coughing.)    Negative: MODERATE difficulty breathing (e.g., speaks in phrases, SOB even at rest, pulse 100-120)    Negative: Headache and stiff neck (can't touch chin to chest)    Negative: Oxygen level (e.g., pulse oximetry) 90 percent or lower    Answer Assessment - Initial Assessment  "Questions  1. COVID-19 DIAGNOSIS: \"Who made your COVID-19 diagnosis?\" \"Was it confirmed by a positive lab test or self-test?\" If not diagnosed by a doctor (or NP/PA), ask \"Are there lots of cases (community spread) where you live?\" Note: See [facility] health department website, if unsure.      This morning at home 5/25/23  2. COVID-19 EXPOSURE: \"Was there any known exposure to COVID before the symptoms began?\" CDC Definition of close contact: within 6 feet (2 meters) for a total of 15 minutes or more over a 24-hour period.       Mother in law was diagnosed on 5/23/23  3. ONSET: \"When did the COVID-19 symptoms start?\"       5/25/23 3 am  4. WORST SYMPTOM: \"What is your worst symptom?\" (e.g., cough, fever, shortness of breath, muscle aches)      congestion  5. COUGH: \"Do you have a cough?\" If Yes, ask: \"How bad is the cough?\"        no  6. FEVER: \"Do you have a fever?\" If Yes, ask: \"What is your temperature, how was it measured, and when did it start?\"      no  7. RESPIRATORY STATUS: \"Describe your breathing?\" (e.g., shortness of breath, wheezing, unable to speak)       no  8. BETTER-SAME-WORSE: \"Are you getting better, staying the same or getting worse compared to yesterday?\"  If getting worse, ask, \"In what way?\"      Worse today as its the first day  9. HIGH RISK DISEASE: \"Do you have any chronic medical problems?\" (e.g., asthma, heart or lung disease, weak immune system, obesity, etc.)      no  10. VACCINE: \"Have you had the COVID-19 vaccine?\" If Yes, ask: \"Which one, how many shots, when did you get it?\"        Yes, and boosted 4 shots pfizer  11. BOOSTER: \"Have you received your COVID-19 booster?\" If Yes, ask: \"Which one and when did you get it?\"        pfizer  12. PREGNANCY: \"Is there any chance you are pregnant?\" \"When was your last menstrual period?\"        no  13. OTHER SYMPTOMS: \"Do you have any other symptoms?\"  (e.g., chills, fatigue, headache, loss of smell or taste, muscle pain, sore throat)        Chills, " "sore throat,   14. O2 SATURATION MONITOR:  \"Do you use an oxygen saturation monitor (pulse oximeter) at home?\" If Yes, ask \"What is your reading (oxygen level) today?\" \"What is your usual oxygen saturation reading?\" (e.g., 95%)    Protocols used: CORONAVIRUS (COVID-19) DIAGNOSED OR [id number]      "

## 2023-10-29 DIAGNOSIS — E78.2 MIXED HYPERLIPIDEMIA: ICD-10-CM

## 2023-10-30 RX ORDER — ATORVASTATIN CALCIUM 20 MG/1
10 TABLET, FILM COATED ORAL DAILY
Qty: 15 TABLET | Refills: 0 | Status: SHIPPED | OUTPATIENT
Start: 2023-10-30 | End: 2023-12-07

## 2023-10-30 NOTE — TELEPHONE ENCOUNTER
I have not seen this patient since June 2022, patient is overdue for physical, overdue for labs, please advise patient to schedule appointment to see me in clinic, can use my same-day or approval only spot

## 2023-12-28 NOTE — MR AVS SNAPSHOT
After Visit Summary   10/22/2018    Shanna Sutton    MRN: [id number]           Patient Information     Date Of Birth          1965        Visit Information        Provider Department      10/22/2018 4:20 PM Lexi Forrester MD [facility]        Today's Diagnoses     External hemorrhoids    -  1    Trochanteric bursitis of left hip           Follow-ups after your visit        Additional Services     COLORECTAL SURGERY REFERRAL       Your provider has referred you to: [facility]: [facility] [phone]   [email or web address]    Referral Reason(s): Hemorrhoids  Special Concerns: None    It is OK to leave a message on patient's voicemail.    Please be aware that coverage of these services is subject to the terms and limitations of your health insurance plan.  Call member services at your health plan with any benefit or coverage questions.      Please bring the following with you to your appointment:    (1) Any X-Rays, CTs or MRIs which have been performed.  Contact the facility where they were done to arrange for  prior to your scheduled appointment.    (2) List of current medications  (3) This referral request   (4) Any documents/labs given to you for this referral                  Who to contact     If you have questions or need follow up information about today's clinic visit or your schedule please contact [facility] directly at [phone].  Normal or non-critical lab and imaging results will be communicated to you by MyChart, letter or phone within 4 business days after the clinic has received the results. If you do not hear from us within 7 days, please contact the clinic through MyChart or phone. If you have a critical or abnormal lab result, we will notify you by phone as soon as possible.  Submit refill requests through [employer] or call your pharmacy and they will forward the refill request to us.  Please allow 3 business days for your refill to be completed.          Additional Information About Your Visit        MyChart Information     [employer] gives you secure access to your electronic health record. If you see a primary care provider, you can also send messages to your care team and make appointments. If you have questions, please call your primary care clinic.  If you do not have a primary care provider, please call [phone] and they will assist you.        Care EveryWhere ID     This is your Care EveryWhere ID. This could be used by other organizations to access your [place] medical records  [id number]        Your Vitals Were     Pulse Temperature Respirations Pulse Oximetry BMI (Body Mass Index)       89 98.1  F (36.7  C) (Oral) 12 99% 25.32 kg/m2        Blood Pressure from Last 3 Encounters:   10/22/18 104/70   01/11/18 118/72   12/05/17 122/76    Weight from Last 3 Encounters:   10/22/18 166 lb 8 oz (75.5 kg)   01/11/18 173 lb (78.5 kg)   12/05/17 173 lb (78.5 kg)              We Performed the Following     COLORECTAL SURGERY REFERRAL        Primary Care Provider Office Phone # Fax #    Krishnakumari G MD Mitzy [phone]       [address] [facility] [address]        Equal Access to Services     KAIN SOFIA AH: Hadii aad ku hadasho Soomaali, waaxda luqadaha, qaybta kaalmada adeegyada, waxay idiin hayaan anil mcbride . So [facility] [phone].    ATENCIÓN: Si habla español, tiene a hill disposición servicios gratuitos de asistencia lingüística. Llame al [phone].    We comply with applicable federal civil rights laws and [place] laws. We do not discriminate on the basis of race, color, national origin, age, disability, sex, sexual orientation, or gender identity.            Thank you!     Thank you for choosing [facility]  for your care. Our goal is always to provide you with excellent care. Hearing back from our patients is one way we can  continue to improve our services. Please take a few minutes to complete the written survey that you may receive in the mail after your visit with us. Thank you!             Your Updated Medication List - Protect others around you: Learn how to safely use, store and throw away your medicines at www.disposemymeds.org.          This list is accurate as of 10/22/18  9:31 PM.  Always use your most recent med list.                   Brand Name Dispense Instructions for use Diagnosis    omeprazole 40 MG capsule    priLOSEC    90 capsule    TAKE 1 CAPSULE (40 MG) BY MOUTH DAILY TAKE 30-60 MINUTES BEFORE A MEAL.    Gastroesophageal reflux disease, esophagitis presence not specified          Cardiac

## 2024-01-23 ASSESSMENT — ENCOUNTER SYMPTOMS
HEMATURIA: 0
FEVER: 0
DIARRHEA: 0
PALPITATIONS: 0
HEADACHES: 0
JOINT SWELLING: 0
CHILLS: 0
MYALGIAS: 0
DYSURIA: 0
SHORTNESS OF BREATH: 0
EYE PAIN: 0
FREQUENCY: 0
CONSTIPATION: 0
NERVOUS/ANXIOUS: 0
SORE THROAT: 0
ARTHRALGIAS: 0
WEAKNESS: 0
BREAST MASS: 0
ABDOMINAL PAIN: 0
HEMATOCHEZIA: 0
COUGH: 0
HEARTBURN: 0
PARESTHESIAS: 0
DIZZINESS: 0
NAUSEA: 0

## 2024-01-24 ENCOUNTER — PATIENT OUTREACH (OUTPATIENT)
Dept: ONCOLOGY | Facility: CLINIC | Age: 59
End: 2024-01-24

## 2024-01-24 ENCOUNTER — OFFICE VISIT (OUTPATIENT)
Dept: INTERNAL MEDICINE | Facility: CLINIC | Age: 59
End: 2024-01-24
Payer: COMMERCIAL

## 2024-01-24 VITALS
OXYGEN SATURATION: 97 % | HEIGHT: 67 IN | HEART RATE: 95 BPM | WEIGHT: 171.4 LBS | BODY MASS INDEX: 26.9 KG/M2 | TEMPERATURE: 97.5 F | SYSTOLIC BLOOD PRESSURE: 112 MMHG | RESPIRATION RATE: 13 BRPM | DIASTOLIC BLOOD PRESSURE: 78 MMHG

## 2024-01-24 DIAGNOSIS — E78.2 MIXED HYPERLIPIDEMIA: ICD-10-CM

## 2024-01-24 DIAGNOSIS — Z84.81 FAMILY HISTORY OF CARRIER OF GENETIC DISEASE: ICD-10-CM

## 2024-01-24 DIAGNOSIS — Z00.00 ROUTINE HISTORY AND PHYSICAL EXAMINATION OF ADULT: Primary | ICD-10-CM

## 2024-01-24 DIAGNOSIS — Z80.0 FAMILY HISTORY OF COLON CANCER: ICD-10-CM

## 2024-01-24 DIAGNOSIS — R06.83 SNORING: ICD-10-CM

## 2024-01-24 DIAGNOSIS — Z80.0 FAMILY HISTORY OF PANCREATIC CANCER: ICD-10-CM

## 2024-01-24 PROBLEM — Z15.89 BIALLELIC MUTATION OF PALB2 GENE: Status: RESOLVED | Noted: 2024-01-24 | Resolved: 2024-01-24

## 2024-01-24 PROBLEM — T83.32XA INTRAUTERINE CONTRACEPTIVE DEVICE THREADS LOST, INITIAL ENCOUNTER: Status: RESOLVED | Noted: 2019-12-16 | Resolved: 2024-01-24

## 2024-01-24 PROBLEM — Z15.01 BIALLELIC MUTATION OF PALB2 GENE: Status: ACTIVE | Noted: 2024-01-24

## 2024-01-24 PROBLEM — Z15.01 BIALLELIC MUTATION OF PALB2 GENE: Status: RESOLVED | Noted: 2024-01-24 | Resolved: 2024-01-24

## 2024-01-24 PROBLEM — Z15.09 BIALLELIC MUTATION OF PALB2 GENE: Status: ACTIVE | Noted: 2024-01-24

## 2024-01-24 PROBLEM — Z15.09 BIALLELIC MUTATION OF PALB2 GENE: Status: RESOLVED | Noted: 2024-01-24 | Resolved: 2024-01-24

## 2024-01-24 PROBLEM — Z15.89 BIALLELIC MUTATION OF PALB2 GENE: Status: ACTIVE | Noted: 2024-01-24

## 2024-01-24 LAB
ALBUMIN SERPL BCG-MCNC: 4.5 G/DL (ref 3.5–5.2)
ALP SERPL-CCNC: 133 U/L (ref 40–150)
ALT SERPL W P-5'-P-CCNC: 66 U/L (ref 0–50)
ANION GAP SERPL CALCULATED.3IONS-SCNC: 12 MMOL/L (ref 7–15)
AST SERPL W P-5'-P-CCNC: 53 U/L (ref 0–45)
BILIRUB SERPL-MCNC: 0.4 MG/DL
BUN SERPL-MCNC: 17.2 MG/DL (ref 6–20)
CALCIUM SERPL-MCNC: 10 MG/DL (ref 8.6–10)
CHLORIDE SERPL-SCNC: 105 MMOL/L (ref 98–107)
CHOLEST SERPL-MCNC: 246 MG/DL
CREAT SERPL-MCNC: 0.8 MG/DL (ref 0.51–0.95)
DEPRECATED HCO3 PLAS-SCNC: 24 MMOL/L (ref 22–29)
EGFRCR SERPLBLD CKD-EPI 2021: 86 ML/MIN/1.73M2
ERYTHROCYTE [DISTWIDTH] IN BLOOD BY AUTOMATED COUNT: 12.1 % (ref 10–15)
FASTING STATUS PATIENT QL REPORTED: YES
GLUCOSE SERPL-MCNC: 104 MG/DL (ref 70–99)
HCT VFR BLD AUTO: 42.3 % (ref 35–47)
HDLC SERPL-MCNC: 58 MG/DL
HGB BLD-MCNC: 14.9 G/DL (ref 11.7–15.7)
LDLC SERPL CALC-MCNC: 152 MG/DL
MCH RBC QN AUTO: 30.2 PG (ref 26.5–33)
MCHC RBC AUTO-ENTMCNC: 35.2 G/DL (ref 31.5–36.5)
MCV RBC AUTO: 86 FL (ref 78–100)
NONHDLC SERPL-MCNC: 188 MG/DL
PLATELET # BLD AUTO: 278 10E3/UL (ref 150–450)
POTASSIUM SERPL-SCNC: 4.2 MMOL/L (ref 3.4–5.3)
PROT SERPL-MCNC: 7.4 G/DL (ref 6.4–8.3)
RBC # BLD AUTO: 4.94 10E6/UL (ref 3.8–5.2)
SODIUM SERPL-SCNC: 141 MMOL/L (ref 135–145)
TRIGL SERPL-MCNC: 180 MG/DL
TSH SERPL DL<=0.005 MIU/L-ACNC: 2.53 UIU/ML (ref 0.3–4.2)
WBC # BLD AUTO: 6.6 10E3/UL (ref 4–11)

## 2024-01-24 PROCEDURE — 84443 ASSAY THYROID STIM HORMONE: CPT | Performed by: INTERNAL MEDICINE

## 2024-01-24 PROCEDURE — 36415 COLL VENOUS BLD VENIPUNCTURE: CPT | Performed by: INTERNAL MEDICINE

## 2024-01-24 PROCEDURE — 80053 COMPREHEN METABOLIC PANEL: CPT | Performed by: INTERNAL MEDICINE

## 2024-01-24 PROCEDURE — 80061 LIPID PANEL: CPT | Performed by: INTERNAL MEDICINE

## 2024-01-24 PROCEDURE — 99214 OFFICE O/P EST MOD 30 MIN: CPT | Mod: 25 | Performed by: INTERNAL MEDICINE

## 2024-01-24 PROCEDURE — 99396 PREV VISIT EST AGE 40-64: CPT | Performed by: INTERNAL MEDICINE

## 2024-01-24 PROCEDURE — 85027 COMPLETE CBC AUTOMATED: CPT | Performed by: INTERNAL MEDICINE

## 2024-01-24 ASSESSMENT — ENCOUNTER SYMPTOMS
FREQUENCY: 0
WEAKNESS: 0
HEADACHES: 0
JOINT SWELLING: 0
EYE PAIN: 0
ABDOMINAL PAIN: 0
COUGH: 0
PALPITATIONS: 0
SHORTNESS OF BREATH: 0
HEMATURIA: 0
CONSTIPATION: 0
NERVOUS/ANXIOUS: 0
DIZZINESS: 0
MYALGIAS: 0
DYSURIA: 0
SORE THROAT: 0
FEVER: 0
ARTHRALGIAS: 0
DIARRHEA: 0
NAUSEA: 0
CHILLS: 0

## 2024-01-24 NOTE — PROGRESS NOTES
Writer received referral to Cancer Risk Management/Genetic Counseling.    Referred for: Hx of PALB2 mutation, sister was diagnosed with stage IV pancreatic cancer,  family history of colon cancer   Patient was previously seen at [facility] for genetic counseling last in 2017.    Referral reviewed for appropriate plan, and sent to New Patient Scheduling ([phone]) for completion.    Rosa Buckley, RN, BSN  Oncology New Patient Nurse Navigator   [facility]  [phone]

## 2024-01-24 NOTE — NURSING NOTE
"/78   Pulse 95   Temp 97.5  F (36.4  C) (Tympanic)   Resp 13   Ht 1.708 m (5' 7.25\")   Wt 77.7 kg (171 lb 6.4 oz)   SpO2 97%   BMI 26.65 kg/m      "

## 2024-01-24 NOTE — PROGRESS NOTES
Preventive Care Visit  [facility]  Lexi Forrester MD, Internal Medicine  Jan 24, 2024       SUBJECTIVE:   Shanna is a 58 year old, presenting for the following:  Physical        1/24/2024     7:41 AM   Additional Questions   Roomed by tawanda   Accompanied by self         1/24/2024     7:41 AM   Patient Reported Additional Medications   Patient reports taking the following new medications none     Healthy Habits:     Getting at least 3 servings of Calcium per day:  Yes    Bi-annual eye exam:  Yes    Dental care twice a year:  NO    Sleep apnea or symptoms of sleep apnea:  Excessive snoring    Diet:  Regular (no restrictions)    Frequency of exercise:  2-3 days/week    Duration of exercise:  15-30 minutes    Taking medications regularly:  Yes    Additional concerns today:  Yes      Today's PHQ-2 Score:       1/23/2024    11:54 AM   PHQ-2 ( 1999 [employer])   Q1: Little interest or pleasure in doing things 1   Q2: Feeling down, depressed or hopeless 1   PHQ-2 Score 2   Q1: Little interest or pleasure in doing things Several days   Q2: Feeling down, depressed or hopeless Several days   PHQ-2 Score 2       Patient has questions on PAL B2 mutation and its linkage to breast cancer/ovarian cancer/pancreatic cancer.  Patient had a left salpingo-oophorectomy and would like to know if she needs a right oophorectomy also.    Patient also states that her sister was diagnosed with stage IV pancreatic cancer and would like to know what is the screening test for pancreatic cancer.  Patient has seen [place] gastroenterologist for elevated liver enzymes and had MRI liver and also CT scan of her abdomen.  Last CT 2022 and patient has not seen GI since then.    Patient also has family history of colon cancer and last colonoscopy was 2019    Patient also complains of excessive snoring and feels like taking naps during the daytime, has not had a sleep study    Hyperlipidemia Follow-Up    Are you  regularly taking any medication or supplement to lower your cholesterol?   Yes- lipitor 20 mg half a tablet  3x wk  Are you having muscle aches or other side effects that you think could be caused by your cholesterol lowering medication?  No    Gastroesophageal reflux disease: Stable, takes omeprazole only as needed    Patient follows up with OB/GYN for Pap smears and is due for Pap 12/24      Have you ever done Advance Care Planning? (For example, a Health Directive, POLST, or a discussion with a medical provider or your loved ones about your wishes): No, advance care planning information given to patient to review.  Advanced care planning was discussed at today's visit.      Past Medical History:   Diagnosis Date    ASCUS on Pap smear 08/21/2007    - HPV, - HPV, -HPV    Benign neoplasm of skin, site unspecified     Family history of colon cancer     Family history of colon cancer     History of colposcopy with cervical biopsy 03/01/2011    WNL    Infectious mononucleosis     Mixed hyperlipidemia     Slow transit constipation     irritable bowel syndrome  abstracted [id number]       Past Surgical History:   Procedure Laterality Date    BREAST SURGERY  1/11/2017    prophylactic double mastectomy    Mastectomy Bilateral 01/11/2017    prophylactic double mastectomy: 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, 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       Current Outpatient Medications   Medication Sig Dispense Refill    atorvastatin (LIPITOR) 20 MG tablet TAKE 0.5 TABLETS (10 MG) BY MOUTH DAILY NEEDS APPOINTMENT AND LABS FOR FURTHER REFILLS 30 tablet 0    multivitamin w/minerals (THERA-VIT-M) tablet Take 1 tablet by mouth daily      omeprazole (PRILOSEC) 40 MG DR capsule TAKE 1 CAPSULE BY MOUTH EVERY DAY 90 capsule 2    Vitamin D3 (CHOLECALCIFEROL) 25 mcg (1000 units) tablet Take by mouth daily      clobetasol (TEMOVATE) 0.05 % external ointment Apply a thin layer to affected area twice a day for 4 weeks, then twice a week as directed. (Patient not taking: Reported on  1/24/2024) 60 g 1       Family History   Problem Relation Age of Onset    Breast Cancer Mother         age 65    Cancer - colorectal Mother         age 73    Colon Cancer Mother     Respiratory Father         COPD    Skin Cancer Father     Pancreatic Cancer Sister     Family History Negative Sister         5-healthy    Breast Cancer Sister     Breast Cancer Sister         diagnosed at age 43    Thyroid Disease Sister         3 sisters    Breast Cancer Sister         diagnosed at age 58    Breast Cancer Sister     Thyroid Disease Sister     Family History Negative Brother         6-healthy       Social History     Tobacco Use    Smoking status: Never    Smokeless tobacco: Never   Substance Use Topics    Alcohol use: Yes     Comment: Rarely           1/23/2024    11:53 AM   Alcohol Use   Prescreen: >3 drinks/day or >7 drinks/week? No       Reviewed orders with patient.  Reviewed health maintenance and updated orders accordingly - Yes  Lab work is in process    Breast Cancer Screening:    [facility]-7:       6/7/2022     9:43 AM 1/24/2024     7:41 AM   Breast CA Risk Assessment ([facility]-7)   Did any of your first-degree relatives have breast or ovarian cancer? Yes Yes   Did any of your relatives have bilateral breast cancer? No No   Did any man in your family have breast cancer? No    Did any woman in your family have breast and ovarian cancer? No    Did any woman in your family have breast cancer before age 50 y? Yes    Do you have 2 or more relatives with breast and/or ovarian cancer? Yes    Do you have 2 or more relatives with breast and/or bowel cancer? No         Pertinent mammograms are reviewed under the imaging tab.    History of abnormal Pap smear: YES - updated in Problem List and Health Maintenance accordingly      Latest Ref Rng & Units 12/16/2019     1:30 PM 12/16/2019     1:15 PM 11/5/2014    12:00 AM   PAP / HPV   PAP (Historical)   NIL  NIL    HPV 16 DNA NEG^Negative Negative      HPV 18 DNA NEG^Negative Negative   "    Other HR HPV NEG^Negative Negative        Reviewed and updated as needed this visit by clinical staff   Tobacco                Reviewed and updated as needed this visit by Provider                    Review of Systems   Constitutional:  Negative for chills and fever.   HENT:  Negative for congestion, ear pain, hearing loss and sore throat.    Eyes:  Negative for pain and visual disturbance.   Respiratory:  Negative for cough and shortness of breath.    Cardiovascular:  Negative for chest pain and palpitations.   Gastrointestinal:  Negative for abdominal pain, constipation, diarrhea and nausea.   Genitourinary:  Negative for dysuria, frequency, genital sores, hematuria, pelvic pain, urgency, vaginal bleeding and vaginal discharge.   Musculoskeletal:  Negative for arthralgias, joint swelling and myalgias.   Neurological:  Negative for dizziness, weakness and headaches.   Psychiatric/Behavioral:  The patient is not nervous/anxious.        OBJECTIVE:   /78   Pulse 95   Temp 97.5  F (36.4  C) (Tympanic)   Resp 13   Ht 1.708 m (5' 7.25\")   Wt 77.7 kg (171 lb 6.4 oz)   SpO2 97%   BMI 26.65 kg/m     Estimated body mass index is 25.65 kg/m  as calculated from the following:    Height as of this encounter: 1.708 m (5' 7.25\").    Weight as of 8/2/22: 74.8 kg (165 lb).  Physical Exam  GENERAL: alert and no distress  EYES: Eyes grossly normal to inspection, PERRL and conjunctivae and sclerae normal  HENT: ear canals and TM's normal, nose and mouth without ulcers or lesions  RESP: lungs clear to auscultation - no rales, rhonchi or wheezes  BREAST: Bilateral breast implants , no palpable axillary masses or adenopathy  CV: regular rate and rhythm, normal S1 S2,    ABDOMEN: soft, nontender,   and bowel sounds normal  MS: no gross musculoskeletal defects noted, no edema  NEURO: Normal strength and tone, mentation intact and speech normal  PSYCH: mentation appears normal, affect normal/bright    ASSESSMENT/PLAN: "       (Z00.00) Routine history and physical examination of adult  (primary encounter diagnosis)  Plan: Colonoscopy Screening  Referral, TSH         with free T4 reflex, Comprehensive metabolic         panel, CBC with platelets           (E78.2) Mixed hyperlipidemia  Plan: On Lipitor 20 mg half a tablet 3 times a week, check lipid panel reflex to direct LDL Fasting and adjust Lipitor dose after results            (Z84.81) Family history of carrier of genetic disease  Comment: PALB2 mutation  Plan: Adult Genetics & Metabolism Referral            (Z80.0) Family history of pancreatic cancer  Plan: Patient had a CT abdomen 6 months ago, has seen [facility],  patient was advised to follow-up with pancreatic specialist at [facility] to discuss monitoring for pancreatic cancer    (Z80.0) Family history of colon cancer  Plan: Last colonoscopy 2019, ordered colonoscopy Screening  Referral,     (R06.83) Snoring  Plan: Adult Sleep Eval & Management          Referral         Patient has been advised of split billing requirements and indicates understanding: Yes      Counseling  Reviewed preventive health counseling, as reflected in patient instructions       Regular exercise       Healthy diet/nutrition       Immunizations  Discussed Shingrix vaccine , pt will check with her insurance            She reports that she has never smoked. She has never used smokeless tobacco.          Signed Electronically by: Lexi Forrester MD  Answers submitted by the patient for this visit:  Annual Preventive Visit (Submitted on 1/23/2024)  Chief Complaint: Annual Exam:  Blood in stool: No  heartburn: No  peripheral edema: No  mood changes: No  Skin sensation changes: No  tenderness: No  breast mass: No  breast discharge: No

## 2024-02-05 DIAGNOSIS — K21.9 GASTROESOPHAGEAL REFLUX DISEASE, UNSPECIFIED WHETHER ESOPHAGITIS PRESENT: ICD-10-CM

## 2024-02-06 RX ORDER — OMEPRAZOLE 40 MG/1
CAPSULE, DELAYED RELEASE ORAL
Qty: 90 CAPSULE | Refills: 2 | Status: SHIPPED | OUTPATIENT
Start: 2024-02-06

## 2024-02-13 ENCOUNTER — TELEPHONE (OUTPATIENT)
Dept: GASTROENTEROLOGY | Facility: CLINIC | Age: 59
End: 2024-02-13
Payer: COMMERCIAL

## 2024-04-20 ASSESSMENT — SLEEP AND FATIGUE QUESTIONNAIRES
HOW LIKELY ARE YOU TO NOD OFF OR FALL ASLEEP WHILE LYING DOWN TO REST IN THE AFTERNOON WHEN CIRCUMSTANCES PERMIT: SLIGHT CHANCE OF DOZING
HOW LIKELY ARE YOU TO NOD OFF OR FALL ASLEEP WHILE SITTING AND TALKING TO SOMEONE: WOULD NEVER DOZE
HOW LIKELY ARE YOU TO NOD OFF OR FALL ASLEEP IN A CAR, WHILE STOPPED FOR A FEW MINUTES IN TRAFFIC: WOULD NEVER DOZE
HOW LIKELY ARE YOU TO NOD OFF OR FALL ASLEEP WHILE SITTING INACTIVE IN A PUBLIC PLACE: WOULD NEVER DOZE
HOW LIKELY ARE YOU TO NOD OFF OR FALL ASLEEP WHILE SITTING QUIETLY AFTER LUNCH WITHOUT ALCOHOL: SLIGHT CHANCE OF DOZING
HOW LIKELY ARE YOU TO NOD OFF OR FALL ASLEEP WHILE WATCHING TV: SLIGHT CHANCE OF DOZING
HOW LIKELY ARE YOU TO NOD OFF OR FALL ASLEEP WHEN YOU ARE A PASSENGER IN A CAR FOR AN HOUR WITHOUT A BREAK: SLIGHT CHANCE OF DOZING
HOW LIKELY ARE YOU TO NOD OFF OR FALL ASLEEP WHILE SITTING AND READING: SLIGHT CHANCE OF DOZING

## 2024-04-25 ENCOUNTER — VIRTUAL VISIT (OUTPATIENT)
Dept: ONCOLOGY | Facility: CLINIC | Age: 59
End: 2024-04-25
Attending: INTERNAL MEDICINE
Payer: COMMERCIAL

## 2024-04-25 DIAGNOSIS — Z15.89 PALB2 GENE MUTATION POSITIVE: Primary | ICD-10-CM

## 2024-04-25 DIAGNOSIS — Z80.3 FAMILY HISTORY OF MALIGNANT NEOPLASM OF BREAST: ICD-10-CM

## 2024-04-25 DIAGNOSIS — Z84.81 FAMILY HISTORY OF CARRIER OF GENETIC DISEASE: ICD-10-CM

## 2024-04-25 DIAGNOSIS — Z80.0 FAMILY HISTORY OF COLON CANCER: ICD-10-CM

## 2024-04-25 DIAGNOSIS — Z80.0 FAMILY HISTORY OF PANCREATIC CANCER: ICD-10-CM

## 2024-04-25 PROCEDURE — 96040 HC GENETIC COUNSELING, EACH 30 MINUTES: CPT | Mod: GT,95 | Performed by: GENETIC COUNSELOR, MS

## 2024-04-25 NOTE — NURSING NOTE
Is the patient currently in the state of [place]? YES    Visit mode:VIDEO    If the visit is dropped, the patient can be reconnected by: VIDEO VISIT: Send to e-mail at: [email or web address]    Will anyone else be joining the visit? NO  (If patient encounters technical issues they should call [phone])    How would you like to obtain your AVS? MyChart    Are changes needed to the allergy or medication list? N/A    Reason for visit: Consult      Renee GAGE

## 2024-04-25 NOTE — PROGRESS NOTES
"Virtual Visit Details    Type of service:  Video Visit     Originating Location (pt. Location): {video visit patient location:544606::\"Home\"}  {PROVIDER LOCATION On-site should be selected for visits conducted from your clinic location or adjoining [facility] hospital, academic office, or other nearby [facility] building. Off-site should be selected for all other provider locations, including home:[id number]}  Distant Location (provider location):  {virtual location provider:093546}  Platform used for Video Visit: {Virtual Visit Platforms:753956::\"[employer]\"}    Unable to edit existing provider note, new note created    "

## 2024-04-25 NOTE — PROGRESS NOTES
"Virtual Visit Details    Type of service:  Video Visit     Originating Location (pt. Location): Home  Distant Location (provider location):  Off-site  Platform used for Video Visit: Yamilex  Time spent over video: 47 minutes    4/25/2024    Referring Provider: Lexi Forrester MD    Presenting Information:   I spoke with Shanna Sutton over video today for genetic counseling to discuss her family history of cancer and her previous genetic testing results. This appointment was conducted virtually due to COVID-19 precautions. We talked today to review this history, cancer screening recommendations, and available genetic testing options.    Personal History:  Shanna is a 58 year old female. She does not have any personal history of cancer.     She was seen for genetic counseling and testing at the [facility] in 2015. At that time she elected to have single site analysis for the familial PALB2 mutation, performed at [facility], July 2015. Results were positive for a PALB2 mutation called [id number] (also known as [id number]). Please see outside genetic counseling notes dated 7/17/15, 7/24/15, 10/6/16, 11/1/16, and 4/18/17 through [facility] for complete details. Please see genetic test results scanned into the Laboratory tab dated 7/17/15 and called \"GENETIC LAB RESULT - HIM SCAN\".     Shanna underwent prophylactic bilateral mastectomy on 1/11/17. She has a history of two left breast biopsies on 3/7/11 that were negative.    She had her first menstrual period at age 15, her first child at age 30, and is postmenopausal. 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 Her other ovary and uterus are in place. She " reports that she has not used hormone replacement therapy. She has used oral contraceptives. Her first colonoscopy on 4/24/19 detected one hyperplastic polyp and follow-up was recommended in 5 years. She has her next one scheduled for May. She reports that she has skin exams on a semi-regular basis. Shanna reported no history of tobacco use and occasional social alcohol use.    Family History: (Please see scanned pedigree for detailed family history information)  Children:    She has one son (age 28) and one daughter (age 25), both with no known history of cancer. They have not yet had genetic testing.   Siblings:    Her brother is 73 years old with no known history of cancer.     His daughter (Shanna's niece) is 43 years old and was diagnosed with breast cancer at age 33. She had panel genetic testing at that time and tested positive for a PALB2 mutation. She tested negative for BRCA1 and BRCA2 mutations. Shanna reports that she was the first individual in the family to test positive for the PALB2 mutation.    Her sister, Alma, is 75 years old and has a history of breast cancer at age 70. She has reportedly tested negative for the familial PALB2 mutation (only had testing of this gene).     Another sister, Jamal, is 74 years old and was diagnosed with pancreatic cancer at age 72. She is currently having end of life care due to this. She has tested positive for the familial PALB2 mutation (only had testing of this gene). 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 She has a history of smoking when she was younger.     A third sister, Patricia, was diagnosed with breast cancer at age 58 and passed away at age 62. She had tested positive for the PALB2 mutation. She also had negative BRCA1/2 testing.     Her fourth sister, Socorro, is in her 60s and was diagnosed with triple negative breast cancer at age 56. She has tested positive for the familial PALB2 mutation and  negative for BRCA1/2 mutations.     Her fifth sister, Dai, is in her 60s and was diagnosed with breast cancer at age 44. She has tested positive for the PALB2 mutation (only tested for this gene).     Her sixth sister, Alida, is 60 years old with no known history of cancer. She has tested negative for the familial PALB2 mutation.   Maternal:    Her mother was diagnosed with breast cancer at age 64 and then with colon cancer at age 72. She passed away at age 92. She never had any genetic testing.     She had three uncles and three aunts who she reports all passed away at older ages with no known history of cancer.     One cousin (daughter of one of her uncles) was diagnosed with pancreatic cancer in her late 60s in 2018. Shanna is not sure if she is still alive. She has limited information about her.   Paternal:    Her father passed away between age 74-76 with no known history of cancer.     Her uncle passed away at a younger age due to lung and esophageal cancer. He had a history of heavy smoking and also asbestos exposure.     His daughter (Shanna's cousin) was diagnosed with breast cancer in 2004. This was metastatic to her brain in 2016. She passed away in her early 60s.    His son passed away due to lung cancer.     Another son has a history of a carcinoid tumor in his colon in 2010. He now has another cancer type, but Shanna has no further information about this.     Another cousin (daughter of one of her aunts) has a history of breast cancer diagnosed in 2015.    Another cousin (daughter of one of her uncles) was diagnosed with breast cancer at age 55. She has reportedly tested negative for the familial PALB2 mutation.    Another cousin (son of one of her uncles) has a history of hodgkin's lymphoma in his early 20s.     Her maternal ethnicity is Scandinavian, Mong[place]ian, [place]ym[place]. Her paternal ethnicity is [place]. There is no known Ashkenazi Rastafari ancestry on either side of her family.      Discussion:    Shanna has known about her PALB2 mutation since 2015, and is well informed about this gene. She would like to review updated risks and screening information for the PALB2 gene today.     PALB2 Cancer Risks:    Individuals with a PALB2 mutation are at increased risk of certain cancers. These risks may be influenced by family history:   The lifetime breast cancer risk for women is approximately 41-60%, compared to the general population risk of 12.8%. The risk of male breast cancer is approximately 0.9%. These risks may be influenced by the number of family members with breast cancer, ages of onset, and relationship to these individuals.    The lifetime ovarian cancer risk for women is approximately 3-5%, compared to the general population risk of 1.2%  We also discussed the association of PALB2 mutations with increased risk for pancreatic cancer; however, these risks are currently not well defined. Current lifetime risk is estimated to be 2-5%.  Though no exact lifetime risks are available at this time, there may be increased risks for other cancers, as well.    Recommendations for Screening and Management:   We discussed the following screening recommendations for individuals who carry one PALB2 mutation:  Women with PALB2 mutations qualify for high risk breast screening, per the National Comprehensive Cancer Network (NCCN).  High risk breast cancer screening recommendations include annual mammograms and annual breast MRIs, alternating every 6 months. It is recommended that this screening begin at age 30.  Prophylactic mastectomy is a surgical option, which reduces breast cancer risk by 90%. Women with a PALB2 can consider risk-reducing mastectomy.     Screening for pancreatic cancer can be considered based on family history. Per current NCCN guidelines, individuals who carry a harmful mutation in the PALB2 gene and have a close (first or second degree) relative diagnosed with pancreatic cancer  related to the PALB2 mutation can consider pancreatic cancer screening beginning at age 50 (or 10 years younger than the earliest pancreatic cancer diagnosis in the family, whichever is earlier). This screening typically involves endoscopic ultrasonography (EUS) and/or MRI/magnetic resonance cholangiopancreatography (Will et al, Gut 2013. 62: 339-347; Zeinab LUIS, et al., Am J Gastroenterol 2015. 110:223-262).   We discussed the limitations of available ovarian cancer screening. Risk reduction, including salpingo-oophorectomy, may be considered starting at age 45-50. The risks and benefits of this surgery should be discussed in detail with an individual's managing provider.    At this time, no management recommendations have been made for PALB2 carriers regarding other cancers, as there is currently insufficient evidence regarding these risks.     Other screening based on Shanna's personal and family history:  We discussed her mother's history of colon cancer. Per National Comprehensive Cancer Network (NCCN) guidelines, individuals with a first degree relative with colorectal cancer diagnosed at any age should begin colonoscopy at age 40, or 10 years before the earliest diagnosis of colorectal cancer, whichever is first. Colonoscopy should be repeated every 5 years, or based on colonoscopy findings. This would apply to Shanna and her siblings. Shanna is already doing this. These recommendations may change based on personal and family history as well as personal preference, and should be discussed with an individual's physician.  Other population cancer screening options, such as those recommended by the American Cancer Society and the National Comprehensive Cancer Network (NCCN), are also appropriate for Shanna and her family. These screening recommendations may change if there are changes to Shanna's personal and/or family history of cancer. Final screening recommendations should be made by each individual's  primary care provider.         We discussed that Shanna could participate in our Cancer Risk Management Program in which our nursing specialist provides an individual screening plan and assists with medical management. As noted above, Shanna has already had a prophylactic bilateral mastectomy. She is planning to speak with her OB/Gyn who did her gynecologic surgery in 2022 regarding possible removal of her remaining ovary. I will place a referral for her to meet with one of our gastroenterologists who does pancreatic cancer screening. She declined a referral to the Cancer Risk Management Program at this time.     Of note, the above information is based on our current understanding of Shanna's genetic findings. Shanna is encouraged to reach out to me regularly regarding any pertinent updates to her personal and/or family history of cancer, as our understanding of the genetic findings in her family may change over time.     Implications for Family Members:  We reviewed that mutations in the PALB2 gene are inherited in an autosomal dominant pattern. This means that each of Shanna's children have a 50% chance of inheriting the same mutation. Likewise, each of her siblings has a 50% chance of having the same mutation. Extended relatives may also carry this mutation, and she is encouraged to share this information with her family members on both sides of the family. I am happy to help her relatives connect with a genetic counselor in their area if they would like to discuss testing.    Of note, Shanna shared that her  was diagnosed with prostate cancer at age 61 and that he has a family history of a BRCA1 gene mutation. She reports that he is planning to have testing for the familial BRCA1 mutation soon. Neither of her children have had testing for the familial PALB2 mutation yet, and we discussed that they should consider waiting until their father's testing has been completed to determine whether they are  also at risk to have inherited a BRCA1 mutation from him. They could be tested for both of these genes at the same time if necessary.     We discussed the reproductive implications of having a PALB2 mutation. In rare situations in which both parents have a mutation in the PALB2 gene, their children each have a 25% risk for Fanconi anemia. Fanconi anemia is a rare condition with onset in childhood that often results in physical abnormalities, growth retardation, bone marrow failure, and increased risk for cancer. For individuals of childbearing age with PALB2 mutations, genetic counseling and genetic testing may be advised for their partners.     We also discussed that in situations in which both parents have a mutation in the BRCA1 gene, their children each have a 25% risk for Fanconi anemia. Therefore, if either of her children are found to carry the PALB2 mutation or the BRCA1 mutation (if their father tests positive for it), this information may be helpful for them for family planning purposes in addition to cancer screening options.     Even though Shanna's genetic testing result was positive, other relatives may carry a different gene mutation associated with an increased risk for cancer. Genetic counseling is recommended for her children, siblings that have not yet tested, and extended maternal and paternal relatives to discuss genetic testing options. If any of her relatives do pursue genetic testing, Shanna is encouraged to contact me so that we may review the impact of their test results on her.    Additional Testing Considerations:  We also discussed that since Shanna's testing in 2015 only analyzed for the familial PALB2 mutation and since multiple of her siblings have also only tested for the familial mutation or have only had BRCA1 and BRCA2 testing in addition to PALB2 testing, it is possible Shanna does carry another gene mutation that increases her risk for cancer. We discussed that there are  additional genes now known to cause increased risk for breast, pancreatic, colon, and other cancers. As many of these genes present with overlapping features in a family and accurate cancer risk cannot always be established based upon the pedigree analysis alone, it would be reasonable for Shanna to consider updated panel genetic testing to analyze multiple genes at once. Shanna declined additional testing at this time as she states that it was very difficult for her to find out about her PALB2 mutation. She also notes that as her sister is currently at the end of her life due to her pancreatic cancer, Shanna feels that finding out about a new gene mutation would be too much for her to handle at this time. She is encouraged to contact me if she wishes to readdress these larger gene panel options in the future.    Support Resources:  [employer]    www.Plurality.org  [employer] is the only national non-profit organization focused on prevention and early detection of breast and ovarian cancer in young women.  [employer] aims to reach the 52 million young women in the [place] between the ages of 18 and 45 with innovative, life-saving breast and ovarian health programs, thereby empowering this and future generations of women to live healthier, happier, and longer lives.  [employer]: Facing Our Risk of Cancer Empowered  www.facingourrisk.org  FOR[employer] s mission is to improve the lives of individuals and families affected by hereditary breast, ovarian, and related cancers by creating awareness, supplying information and support to the community, advocating for and supporting research, and working with the research and medical communities.     Helpline: 1-122.633.8593    Message boards    Peer Navigation and local support groups  [facility]: [place] [facility] [place] http://mnovarian.org/  [employer] was started in 1999 by a small group of ovarian cancer survivors who came together to fund ovarian cancer research, raise  awareness of the disease, and provide support to women with ovarian cancer and their families.  [employer] Sister[employer]   www.fireflysisterhood.org  Based in the [facility], the [employer] Sister[employer] connects women diagnosed with breast cancer with trained breast cancer survivors to offer support, guidance and hope.  [employer] [place] www.amysclubtwincities.org  [employer] [place] is a 501(c)3 nonprofit and the local affiliate of the Cancer Support Community, a network of more than 54 supportive, free and welcoming  clubhouses  where everyone living with cancer can come for social, emotional and psychological support. Clubs are healing environments where individuals learn from each other with guidance from licensed professionals.  National Society of Genetic Counselors https://www.nsgc.org  The National Society of Genetic Counselors advances the various roles of genetic counselors in health care by fostering education, research, and public policy to ensure the availability of quality genetic services.  A number of resources are available through this website for providers and patients seeking additional information about genetic counseling services.     Plan:  1.  Shanna plans to follow up with her OB/Gyn and other physicians.  2.  A referral was placed for Shanna to meet with gastroenterology to discuss pancreatic cancer screening.    If Shanna has additional questions, I encouraged her to contact me directly at [phone].     Tiny Fernández MS, [facility] [place]  Licensed, Certified Genetic Counselor  Office: [phone]  Email: [email or web address]

## 2024-04-25 NOTE — LETTER
"    4/25/2024         RE: Shanna Sutton  [address] [facility] [id number]        Dear Colleague,    Thank you for referring your patient, Shanna Sutton, to the [facility]. Please see a copy of my visit note below.    Virtual Visit Details    Type of service:  Video Visit     Originating Location (pt. Location): Home  Distant Location (provider location):  Off-site  Platform used for Video Visit: [employer]  Time spent over video: 47 minutes    4/25/2024    Referring Provider: Lexi Forrester MD    Presenting Information:   I spoke with Shanna Sutton over video today for genetic counseling to discuss her family history of cancer and her previous genetic testing results. This appointment was conducted virtually due to COVID-19 precautions. We talked today to review this history, cancer screening recommendations, and available genetic testing options.    Personal History:  Shanna is a 58 year old female. She does not have any personal history of cancer.     She was seen for genetic counseling and testing at the [facility] in 2015. At that time she elected to have single site analysis for the familial PALB2 mutation, performed at [facility], July 2015. Results were positive for a PALB2 mutation called [id number] (also known as [id number]). Please see outside genetic counseling notes dated 7/17/15, 7/24/15, 10/6/16, 11/1/16, and 4/18/17 through Care Everywhere for complete details. Please see genetic test results scanned into the Laboratory tab dated 7/17/15 and called \"GENETIC LAB RESULT - HIM SCAN\".     Shanna underwent prophylactic bilateral mastectomy on 1/11/17. She has a history of two left breast biopsies on 3/7/11 that were negative.    She had her first menstrual period at age 15, her first child at age 30, and is postmenopausal. 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 Her other ovary and uterus are in place. She reports that she has not used hormone replacement therapy. She has used oral contraceptives. Her first colonoscopy on 4/24/19 detected one hyperplastic polyp and follow-up was recommended in 5 years. She has her next one scheduled for May. She reports that she has skin exams on a semi-regular basis. Shanna reported no history of tobacco use and occasional social alcohol use.    Family History: (Please see scanned pedigree for detailed family history information)  Children:  She has one son (age 28) and one daughter (age 25), both with no known history of cancer. They have not yet had genetic testing.   Siblings:  Her brother is 73 years old with no known history of cancer.   His daughter (Shanna's niece) is 43 years old and was diagnosed with breast cancer at age 33. She had panel genetic testing at that time and tested positive for a PALB2 mutation. She tested negative for BRCA1 and BRCA2 mutations. Shanna reports that she was the first individual in the family to test positive for the PALB2 mutation.  Her sister, Alma, is 75 years old and has a history of breast cancer at age 70. She has reportedly tested negative for the familial PALB2 mutation (only had testing of this gene).   Another sister, Jamal, is 74 years old and was diagnosed with pancreatic cancer at age 72. She is currently having end of life care due to this. She has tested positive for the familial PALB2 mutation (only had testing of this gene). 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 She has a history of smoking when she was younger.   A third sister, Patricia, was diagnosed with breast cancer at age 58 and passed away at age 62. She had tested  positive for the PALB2 mutation. She also had negative BRCA1/2 testing.   Her fourth sister, Socorro, is in her 60s and was diagnosed with triple negative breast cancer at age 56. She has tested positive for the familial PALB2 mutation and negative for BRCA1/2 mutations.   Her fifth sister, Dai, is in her 60s and was diagnosed with breast cancer at age 44. She has tested positive for the PALB2 mutation (only tested for this gene).   Her sixth sister, Alida, is 60 years old with no known history of cancer. She has tested negative for the familial PALB2 mutation.   Maternal:  Her mother was diagnosed with breast cancer at age 64 and then with colon cancer at age 72. She passed away at age 92. She never had any genetic testing.   She had three uncles and three aunts who she reports all passed away at older ages with no known history of cancer.   One cousin (daughter of one of her uncles) was diagnosed with pancreatic cancer in her late 60s in 2018. Shanna is not sure if she is still alive. She has limited information about her.   Paternal:  Her father passed away between age 74-76 with no known history of cancer.   Her uncle passed away at a younger age due to lung and esophageal cancer. He had a history of heavy smoking and also asbestos exposure.   His daughter (Shanna's cousin) was diagnosed with breast cancer in 2004. This was metastatic to her brain in 2016. She passed away in her early 60s.  His son passed away due to lung cancer.   Another son has a history of a carcinoid tumor in his colon in 2010. He now has another cancer type, but Shanna has no further information about this.   Another cousin (daughter of one of her aunts) has a history of breast cancer diagnosed in 2015.  Another cousin (daughter of one of her uncles) was diagnosed with breast cancer at age 55. She has reportedly tested negative for the familial PALB2 mutation.  Another cousin (son of one of her uncles) has a history of hodgkin's  lymphoma in his early 20s.     Her maternal ethnicity is Scandinavian, Kos[place], Citizen of [place]. Her paternal ethnicity is English. There is no known Ashkenazi Latter-day ancestry on either side of her family.     Discussion:  Shanna has known about her PALB2 mutation since 2015, and is well informed about this gene. She would like to review updated risks and screening information for the PALB2 gene today.     PALB2 Cancer Risks:    Individuals with a PALB2 mutation are at increased risk of certain cancers. These risks may be influenced by family history:   The lifetime breast cancer risk for women is approximately 41-60%, compared to the general population risk of 12.8%. The risk of male breast cancer is approximately 0.9%. These risks may be influenced by the number of family members with breast cancer, ages of onset, and relationship to these individuals.    The lifetime ovarian cancer risk for women is approximately 3-5%, compared to the general population risk of 1.2%  We also discussed the association of PALB2 mutations with increased risk for pancreatic cancer; however, these risks are currently not well defined. Current lifetime risk is estimated to be 2-5%.  Though no exact lifetime risks are available at this time, there may be increased risks for other cancers, as well.    Recommendations for Screening and Management:   We discussed the following screening recommendations for individuals who carry one PALB2 mutation:  Women with PALB2 mutations qualify for high risk breast screening, per the National Comprehensive Cancer Network (NCCN).  High risk breast cancer screening recommendations include annual mammograms and annual breast MRIs, alternating every 6 months. It is recommended that this screening begin at age 30.  Prophylactic mastectomy is a surgical option, which reduces breast cancer risk by 90%. Women with a PALB2 can consider risk-reducing mastectomy.     Screening for pancreatic cancer can be considered  based on family history. Per current NCCN guidelines, individuals who carry a harmful mutation in the PALB2 gene and have a close (first or second degree) relative diagnosed with pancreatic cancer related to the PALB2 mutation can consider pancreatic cancer screening beginning at age 50 (or 10 years younger than the earliest pancreatic cancer diagnosis in the family, whichever is earlier). This screening typically involves endoscopic ultrasonography (EUS) and/or MRI/magnetic resonance cholangiopancreatography (Will et al, Gut 2013. 62: 339-347; Zeinab S, et al., Am J Gastroenterol 2015. 110:223-262).   We discussed the limitations of available ovarian cancer screening. Risk reduction, including salpingo-oophorectomy, may be considered starting at age 45-50. The risks and benefits of this surgery should be discussed in detail with an individual's managing provider.    At this time, no management recommendations have been made for PALB2 carriers regarding other cancers, as there is currently insufficient evidence regarding these risks.     Other screening based on Shanna's personal and family history:  We discussed her mother's history of colon cancer. Per National Comprehensive Cancer Network (NCCN) guidelines, individuals with a first degree relative with colorectal cancer diagnosed at any age should begin colonoscopy at age 40, or 10 years before the earliest diagnosis of colorectal cancer, whichever is first. Colonoscopy should be repeated every 5 years, or based on colonoscopy findings. This would apply to Shanna and her siblings. Shanna is already doing this. These recommendations may change based on personal and family history as well as personal preference, and should be discussed with an individual's physician.  Other population cancer screening options, such as those recommended by the American Cancer Society and the National Comprehensive Cancer Network (NCCN), are also appropriate for Shanna and her  family. These screening recommendations may change if there are changes to Shanna's personal and/or family history of cancer. Final screening recommendations should be made by each individual's primary care provider.         We discussed that Shanna could participate in our Cancer Risk Management Program in which our nursing specialist provides an individual screening plan and assists with medical management. As noted above, Shanna has already had a prophylactic bilateral mastectomy. She is planning to speak with her OB/Gyn who did her gynecologic surgery in 2022 regarding possible removal of her remaining ovary. I will place a referral for her to meet with one of our gastroenterologists who does pancreatic cancer screening. She declined a referral to the Cancer Risk Management Program at this time.     Of note, the above information is based on our current understanding of Shanna's genetic findings. Shanna is encouraged to reach out to me regularly regarding any pertinent updates to her personal and/or family history of cancer, as our understanding of the genetic findings in her family may change over time.     Implications for Family Members:  We reviewed that mutations in the PALB2 gene are inherited in an autosomal dominant pattern. This means that each of Shanna's children have a 50% chance of inheriting the same mutation. Likewise, each of her siblings has a 50% chance of having the same mutation. Extended relatives may also carry this mutation, and she is encouraged to share this information with her family members on both sides of the family. I am happy to help her relatives connect with a genetic counselor in their area if they would like to discuss testing.    Of note, Shanna shared that her  was diagnosed with prostate cancer at age 59 and that he has a family history of a BRCA1 gene mutation. She reports that he is planning to have testing for the familial BRCA1 mutation soon. Neither of  her children have had testing for the familial PALB2 mutation yet, and we discussed that they should consider waiting until their father's testing has been completed to determine whether they are also at risk to have inherited a BRCA1 mutation from him. They could be tested for both of these genes at the same time if necessary.     We discussed the reproductive implications of having a PALB2 mutation. In rare situations in which both parents have a mutation in the PALB2 gene, their children each have a 25% risk for Fanconi anemia. Fanconi anemia is a rare condition with onset in childhood that often results in physical abnormalities, growth retardation, bone marrow failure, and increased risk for cancer. For individuals of childbearing age with PALB2 mutations, genetic counseling and genetic testing may be advised for their partners.     We also discussed that in situations in which both parents have a mutation in the BRCA1 gene, their children each have a 25% risk for Fanconi anemia. Therefore, if either of her children are found to carry the PALB2 mutation or the BRCA1 mutation (if their father tests positive for it), this information may be helpful for them for family planning purposes in addition to cancer screening options.     Even though Shanna's genetic testing result was positive, other relatives may carry a different gene mutation associated with an increased risk for cancer. Genetic counseling is recommended for her children, siblings that have not yet tested, and extended maternal and paternal relatives to discuss genetic testing options. If any of her relatives do pursue genetic testing, Shanna is encouraged to contact me so that we may review the impact of their test results on her.    Additional Testing Considerations:  We also discussed that since Shanna's testing in 2015 only analyzed for the familial PALB2 mutation and since multiple of her siblings have also only tested for the familial  mutation or have only had BRCA1 and BRCA2 testing in addition to PALB2 testing, it is possible Shanna does carry another gene mutation that increases her risk for cancer. We discussed that there are additional genes now known to cause increased risk for breast, pancreatic, colon, and other cancers. As many of these genes present with overlapping features in a family and accurate cancer risk cannot always be established based upon the pedigree analysis alone, it would be reasonable for Shanna to consider updated panel genetic testing to analyze multiple genes at once. Shanna declined additional testing at this time as she states that it was very difficult for her to find out about her PALB2 mutation. She also notes that as her sister is currently at the end of her life due to her pancreatic cancer, Shanna feels that finding out about a new gene mutation would be too much for her to handle at this time. She is encouraged to contact me if she wishes to readdress these larger gene panel options in the future.    Support Resources:  [employer]    www.YOOWALK.org  [employer] is the only national non-profit organization focused on prevention and early detection of breast and ovarian cancer in young women.  [employer] aims to reach the 52 million young women in the [place] between the ages of 18 and 45 with innovative, life-saving breast and ovarian health programs, thereby empowering this and future generations of women to live healthier, happier, and longer lives.  [employer]: Facing Our Risk of Cancer Empowered  www.facingourConstruction Software Technologiesk.org  FORCE s mission is to improve the lives of individuals and families affected by hereditary breast, ovarian, and related cancers by creating awareness, supplying information and support to the community, advocating for and supporting research, and working with the research and medical communities.   Helpline: 1-861.533.8717  Message boards  Peer Navigation and local support  groups  MOCA: [place] [facility] http://mnovarian.org/  MOCA was started in 1999 by a small group of ovarian cancer survivors who came together to fund ovarian cancer research, raise awareness of the disease, and provide support to women with ovarian cancer and their families.  Firefly Sisterhood   www.fireflysisterhood.org  Based in the [facility], the Firefly Sisterhood connects women diagnosed with breast cancer with trained breast cancer survivors to offer support, guidance and hope.  [employer] [place] www.ULURUlubtwincities.org  [facility] [facility] [employer] [place] is a 501(c)3 nonprofit and the local affiliate of the Cancer Support Community, a network of more than 54 supportive, free and welcoming  clubhouses  where everyone living with cancer can come for social, emotional and psychological support. Clubs are healing environments where individuals learn from each other with guidance from licensed professionals.  National Society of Genetic Counselors https://www.nsgc.org  The National Society of Genetic Counselors advances the various roles of genetic counselors in health care by fostering education, research, and public policy to ensure the availability of quality genetic services.  A number of resources are available through this website for providers and patients seeking additional information about genetic counseling services.     Plan:  1.  Shanna plans to follow up with her OB/Gyn and other physicians.  2.  A referral was placed for Shanna to meet with gastroenterology to discuss pancreatic cancer screening.    If Shanna has additional questions, I encouraged her to contact me directly at [phone].     Tiny Fernández MS, [facility]  Licensed, Certified Genetic Counselor  Office: [phone]  Email: [email or web address]

## 2024-04-26 ENCOUNTER — TELEPHONE (OUTPATIENT)
Dept: GASTROENTEROLOGY | Facility: CLINIC | Age: 59
End: 2024-04-26

## 2024-04-26 ENCOUNTER — VIRTUAL VISIT (OUTPATIENT)
Dept: SLEEP MEDICINE | Facility: CLINIC | Age: 59
End: 2024-04-26
Attending: INTERNAL MEDICINE
Payer: COMMERCIAL

## 2024-04-26 VITALS — WEIGHT: 170 LBS | HEIGHT: 68 IN | BODY MASS INDEX: 25.76 KG/M2

## 2024-04-26 DIAGNOSIS — G47.8 NON-RESTORATIVE SLEEP: ICD-10-CM

## 2024-04-26 DIAGNOSIS — R06.83 SNORING: ICD-10-CM

## 2024-04-26 DIAGNOSIS — R29.818 SUSPECTED SLEEP APNEA: Primary | ICD-10-CM

## 2024-04-26 DIAGNOSIS — G47.00 INSOMNIA, UNSPECIFIED TYPE: ICD-10-CM

## 2024-04-26 PROCEDURE — 99203 OFFICE O/P NEW LOW 30 MIN: CPT | Mod: 95 | Performed by: INTERNAL MEDICINE

## 2024-04-26 ASSESSMENT — PAIN SCALES - GENERAL: PAINLEVEL: NO PAIN (0)

## 2024-04-26 NOTE — TELEPHONE ENCOUNTER
Pre assessment completed for upcoming procedure.   (Please see previous telephone encounter notes for complete details)      Procedure details:    Arrival time and facility location reviewed.    Pre op exam needed? N/A    Designated  policy reviewed. Instructed to have someone stay 6  hours post procedure.       Medication review:    Medications reviewed. Please see supporting documentation below. Holding recommendations discussed (if applicable).       Prep for procedure:     Procedure prep instructions not reviewed.  Patient had gone through the prep instructions and asked writer any questions they had.      Any additional information needed:  N/A      Patient  verbalized understanding and had no questions or concerns at this time.      Kavitha Mohan RN  Endoscopy Procedure Pre Assessment RN  [phone] option 4

## 2024-04-26 NOTE — TELEPHONE ENCOUNTER
Pre visit planning completed.      Procedure details:    Patient scheduled for Colonoscopy  on 5/9/24.     Arrival time: 1245. Procedure time 1330    Facility location: [facility]; [address] [place] [address]. Check in location: Main entrance at . Come through the roundabout underneath the awning (not the ER entrance).    Sedation type: Conscious sedation     Pre op exam needed? N/A    Indication for procedure: screening       Chart review:     Electronic implanted devices? No    Recent diagnosis of diverticulitis within the last 6 weeks? No    Diabetic? No      Medication review:    Anticoagulants? No    NSAIDS? No    Other medication HOLDING recommendations:  N/A      Prep for procedure:     Bowel prep recommendation: Standard Miralax  Due to: standard bowel prep.    Prep instructions sent via [employer]         Kavitha Mohan RN  Endoscopy Procedure Pre Assessment RN  [phone] option 4

## 2024-04-26 NOTE — NURSING NOTE
Is the patient currently in the state of [place]? YES    Visit mode:VIDEO    If the visit is dropped, the patient can be reconnected by: VIDEO VISIT: Send to e-mail at: [email or web address]    Will anyone else be joining the visit? NO  (If patient encounters technical issues they should call [phone]    How would you like to obtain your AVS? MyChart    Are changes needed to the allergy or medication list? Pt stated no changes to allergies and Pt stated no med changes    Are refills needed on medications prescribed by this physician? NO  Has patient had flu shot for current/most recent flu season? If so, when? Yes: 12/09/2023    Reason for visit: Consult    Italia GAGE

## 2024-04-26 NOTE — PROGRESS NOTES
Virtual Visit Details    Type of service:  Video Visit     Originating Location (pt. Location): Home    Distant Location (provider location):  On-site  Platform used for Video Visit: [facility]    Sleep Consultation:    Date on this visit: 4/26/2024    Shanna Sutton  is referred by Lexi Forrester for a sleep consultation.     Primary Physician: Lexi Forrester     Shanna Sutton reports nightly snoring and poor quality of sleep for many years.    Her medical history significant for hyperlipidemia.    Shanna does snore every night. Patient's  does complain of snoring, snorting, and poor quality of sleep. She does not have witnessed apneas.They never sleep separately.  Patient sleeps on her side. She denies morning dry mouth, morning headaches, and restless legs.     Shanna goes to sleep at 10:00 PM. She wakes up at 7:30 AM. She falls asleep in 15 minutes.  Shanna denies difficulty falling asleep.  She wakes up 3-4 times a night due to hot flashes or an overactive mind.  If she wakes up around 4 AM, she is unable to return to sleep.  At other times during the night, she returns to sleep in 30-60 minutes.      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    Shanna denies any sleep walking, sleep talking, dream enactment, sleep paralysis, cataplexy, and hypnogogic/hypnopompic hallucinations.    Patient's [place] Sleepiness score 5/24 consistent with no daytime sleepiness.      Shanna naps 0-1 times per week for 10-20 minutes, feels refreshed after naps. She takes some inadvertant naps.  She denies closing eyes, dozing, and falling asleep while driving. Patient was counseled on the importance of driving while alert, to pull over if drowsy, or nap before getting into the vehicle if sleepy.      She uses 0-1 cups/day of tea. Last caffeine intake is usually before  "noon.    Problem List:  Patient Active Problem List    Diagnosis Date Noted    Family history of pancreatic cancer 01/24/2024     Priority: Medium    Family history of carrier of genetic disease 01/24/2024     Priority: Medium     PALB2 mutation      DUB (dysfunctional uterine bleeding) 12/16/2019     Priority: Medium    Mirena IUD insertion 12/5/2017 - remove on or before 12/5/2022 12/05/2017     Priority: Medium    Gastroesophageal reflux disease, esophagitis presence not specified 09/11/2017     Priority: Medium     IMO Regulatory Load OCT 2020      Family history of colon cancer 11/05/2014     Priority: Medium    HYPERLIPIDEMIA LDL GOAL <130 10/31/2010     Priority: Medium    Papanicolaou smear of cervix with atypical squamous cells cannot exclude high grade squamous intraepithelial lesion (ASC-H) 08/21/2007     Priority: Medium     8/21/07 ASCUS pap - HPV  11/20/09 ASCUUS pap - HPV  2/14/11 ASCUS pap - HPV  3/2/11 WNL colposcopy  4/18/12 NIL dx pap to MD for review.  11/5/14 NIL pap, Neg HPV.  12/16/19 NIL pap, Neg HPV.       Benign neoplasm of skin      Priority: Medium     Problem list name updated by automated process. Provider to review      Mixed hyperlipidemia 08/03/2005     Priority: Medium        Past Medical/Surgical History:  Past Medical History:   Diagnosis Date    ASCUS on Pap smear 08/21/2007    - HPV, - HPV, -HPV    Benign neoplasm of skin, site unspecified     Family history of colon cancer     Family history of colon cancer     History of colposcopy with cervical biopsy 03/01/2011    WNL    Infectious mononucleosis     Mixed hyperlipidemia     Slow transit constipation     irritable bowel syndrome  abstracted [id number]     Social History     Tobacco Use    Smoking status: Never    Smokeless tobacco: Never   Substance Use Topics    Alcohol use: Yes     Comment: Rarely    Drug use: No     Physical Examination:  Vitals: Ht 1.727 m (5' 8\")   Wt 77.1 kg (170 lb)   BMI 25.85 kg/m    BMI= Body mass " index is 25.85 kg/m .  GENERAL APPEARANCE: healthy, alert, and no distress  NEURO: mentation intact and speech normal  PSYCH: euthymic mood    Impression/Plan:    To rule out obstructive sleep apnea  Insomnia    Patient is a 58 years old female, with medical history significant for hyperlipidemia, who presents with a history of frequent loud snoring, sleep maintenance difficulty and poor sleep quality.  There is an intermediate risk for obstructive sleep apnea, and an overnight sleep study is recommended for further evaluation.    Plan:     Home sleep apnea testing       She will follow up with me in approximately two weeks after her sleep study has been competed to review the results and discuss plan of care.       Polysomnography & HST reviewed.  Limitations of HST reviewed.   Obstructive sleep apnea reviewed.  Complications of untreated sleep apnea were reviewed.    I spent a total of 30 minutes for this appointment on this date of service which include time spent before, during and after the visit for chart review, patient care, counseling and coordination of care.      Dr. Ruperto Bhandari       CC: Lexi Forrester

## 2024-04-30 ENCOUNTER — TELEPHONE (OUTPATIENT)
Dept: GASTROENTEROLOGY | Facility: CLINIC | Age: 59
End: 2024-04-30
Payer: COMMERCIAL

## 2024-04-30 NOTE — TELEPHONE ENCOUNTER
Caller: Shanna    Reason for Reschedule/Cancellation   (please be detailed, any staff messages or encounters to note?): Death in family      Prior to reschedule please review:  Ordering Provider: RONALDO PACHECO   Sedation Determined: Moderate  Does patient have any ASC Exclusions, please identify?: No      Notes on Cancelled Procedure:  Procedure: Lower Endoscopy [Colonoscopy]   Date: 05/09/2024  Location: [facility]; [address] [place] [address]  Surgeon: ALEXANDRA      Rescheduled: Yes,   Procedure: Lower Endoscopy [Colonoscopy]    Date: 07/11/2024   Location: [facility]; [address] [place] [address]   Surgeon: ALEXANDRA   Sedation Level Scheduled  Moderate ,  Reason for Sedation Level Per Order   Instructions updated and sent: Yes, Davidt     Does patient need PAC or Pre -Op Rescheduled? : No       Did you cancel or rescheduled an EUS procedure? No.

## 2024-05-30 DIAGNOSIS — E78.2 MIXED HYPERLIPIDEMIA: ICD-10-CM

## 2024-05-30 RX ORDER — ATORVASTATIN CALCIUM 20 MG/1
20 TABLET, FILM COATED ORAL DAILY
Qty: 90 TABLET | Refills: 1 | Status: SHIPPED | OUTPATIENT
Start: 2024-05-30

## 2024-06-27 ENCOUNTER — PATIENT OUTREACH (OUTPATIENT)
Dept: INTERNAL MEDICINE | Facility: CLINIC | Age: 59
End: 2024-06-27
Payer: COMMERCIAL

## 2024-06-27 NOTE — TELEPHONE ENCOUNTER
Patient Quality Outreach    Patient is due for the following:   Breast Cancer Screening - Mammogram      Topic Date Due    Hepatitis B Vaccine (1 of 3 - 19+ 3-dose series) Never done    Zoster (Shingles) Vaccine (1 of 2) Never done       Next Steps:   Patient saw Dr. Forrester on 1/24/24 for a physical. Mammogram was discontinued  per [facility].    Type of outreach:    Chart review performed, no outreach needed.      Questions for provider review:    None           Petra Hoyos MA  Chart routed to closed.

## 2024-06-30 NOTE — TELEPHONE ENCOUNTER
Rescheduled Colonoscopy     Pre visit planning completed.      Procedure details:    Patient scheduled for Colonoscopy  on 07.11.2024.     Arrival time: 1300. Procedure time 1345    Facility location: [facility]; [address] [place] [address]. Check in location: Main entrance, door #1 on the North side of the building under roundabout awning. DO NOT GO TO SURGERY/ED ENTRANCE.     Sedation type: Conscious sedation     Pre op exam needed? N/A    Indication for procedure:     Screening         Chart review:     Electronic implanted devices? No    Recent diagnosis of diverticulitis within the last 6 weeks? No    Diabetic? No      Medication review:    Anticoagulants? No    NSAIDS? No    Other medication HOLDING recommendations:  N/A      Prep for procedure:     Bowel prep recommendation: Standard Miralax  Due to: standard bowel prep.    Prep instructions sent via [employer]         Hoa Powell RN  Endoscopy Procedure Pre Assessment RN  [phone] option 4

## 2024-07-01 NOTE — TELEPHONE ENCOUNTER
Pre assessment completed for upcoming procedure.   (Please see previous telephone encounter notes for complete details)      Procedure details:    Arrival time and facility location reviewed.    Pre op exam needed? N/A    Designated  policy reviewed. Instructed to have someone stay 6  hours post procedure.       Medication review:    Medications reviewed. Please see supporting documentation below. Holding recommendations discussed (if applicable).   N/A      Prep for procedure:     Procedure prep instructions reviewed.        Any additional information needed:  N/A      Patient  verbalized understanding and had no questions or concerns at this time.      Hoa Powell RN  Endoscopy Procedure Pre Assessment   [phone] option 4

## 2024-07-10 ENCOUNTER — TELEPHONE (OUTPATIENT)
Dept: GASTROENTEROLOGY | Facility: CLINIC | Age: 59
End: 2024-07-10
Payer: COMMERCIAL

## 2024-07-10 NOTE — TELEPHONE ENCOUNTER
Caller: Shanna Sutton      Reason for Reschedule/Cancellation   (please be detailed, any staff messages or encounters to note?): FAMILY EMERGENCY      Prior to reschedule please review:  Ordering Provider:     RONALDO PACHECO     Sedation Determined: MODERATE  Does patient have any ASC Exclusions, please identify?: NO      Notes on Cancelled Procedure:  Procedure: Lower Endoscopy [Colonoscopy]   Date: 07/11/2024  Location: [facility]; [address] [place] [address]  Surgeon: ALEXANDRA      Rescheduled: Yes,   Procedure: Lower Endoscopy [Colonoscopy]    Date: 09/11/2024   Location: [facility]; [address] [place] [address]   Surgeon: ALEXANDRA   Sedation Level Scheduled  MODERATE ,  Reason for Sedation Level PER ORDER   Instructions updated and sent: VIA [employer]     Does patient need PAC or Pre -Op Rescheduled? : NO       Did you cancel or rescheduled an EUS procedure? No.

## 2024-08-12 ENCOUNTER — MYC MEDICAL ADVICE (OUTPATIENT)
Dept: INTERNAL MEDICINE | Facility: CLINIC | Age: 59
End: 2024-08-12
Payer: COMMERCIAL

## 2024-08-13 ENCOUNTER — LAB (OUTPATIENT)
Dept: LAB | Facility: CLINIC | Age: 59
End: 2024-08-13
Payer: COMMERCIAL

## 2024-08-13 DIAGNOSIS — E78.2 MIXED HYPERLIPIDEMIA: ICD-10-CM

## 2024-08-13 LAB
ALBUMIN SERPL BCG-MCNC: 4.6 G/DL (ref 3.5–5.2)
ALP SERPL-CCNC: 112 U/L (ref 40–150)
ALT SERPL W P-5'-P-CCNC: 34 U/L (ref 0–50)
AST SERPL W P-5'-P-CCNC: 34 U/L (ref 0–45)
BILIRUB DIRECT SERPL-MCNC: <0.2 MG/DL (ref 0–0.3)
BILIRUB SERPL-MCNC: 0.5 MG/DL
CHOLEST SERPL-MCNC: 233 MG/DL
FASTING STATUS PATIENT QL REPORTED: YES
HDLC SERPL-MCNC: 57 MG/DL
LDLC SERPL CALC-MCNC: 117 MG/DL
NONHDLC SERPL-MCNC: 176 MG/DL
PROT SERPL-MCNC: 7.7 G/DL (ref 6.4–8.3)
TRIGL SERPL-MCNC: 295 MG/DL

## 2024-08-13 PROCEDURE — 36415 COLL VENOUS BLD VENIPUNCTURE: CPT

## 2024-08-13 PROCEDURE — 80061 LIPID PANEL: CPT

## 2024-08-13 PROCEDURE — 80076 HEPATIC FUNCTION PANEL: CPT

## 2024-08-15 ENCOUNTER — TRANSFERRED RECORDS (OUTPATIENT)
Dept: HEALTH INFORMATION MANAGEMENT | Facility: CLINIC | Age: 59
End: 2024-08-15
Payer: COMMERCIAL

## 2024-09-04 NOTE — TELEPHONE ENCOUNTER
Rescheduled Colonoscopy     Pre visit planning completed.      Procedure details:    Patient scheduled for Colonoscopy on 09.11.2024.     Arrival time: 0800. Procedure time 0845    Facility location: [facility]; [place] [address]. Check in location: Main entrance, door #1 on the North side of the building under roundabout awning. DO NOT GO TO SURGERY/ED ENTRANCE.     Sedation type: Conscious sedation     Pre op exam needed? No.    Indication for procedure:   Routine history and physical examination of adult            Chart review:     Electronic implanted devices? No    Recent diagnosis of diverticulitis within the last 6 weeks? No      Medication review:    Diabetic? No    Anticoagulants? No    Weight loss medication/injectable? No GLP-1 medication per patient's medication list.  RN will verify with pre-assessment call.    NSAIDS? No NSAID medications per patient's medication list.  RN will verify with pre-assessment call.    Other medication HOLDING recommendations:  N/A      Prep for procedure:     Bowel prep recommendation: Standard Miralax  Due to: standard bowel prep.    Prep instructions sent via [employer]         Hoa Powell RN  Endoscopy Procedure Pre Assessment RN  [phone] option 4

## 2024-09-05 ENCOUNTER — HOSPITAL ENCOUNTER (OUTPATIENT)
Dept: MRI IMAGING | Facility: CLINIC | Age: 59
Discharge: HOME | End: 2024-09-05
Attending: INTERNAL MEDICINE | Admitting: INTERNAL MEDICINE
Payer: COMMERCIAL

## 2024-09-05 DIAGNOSIS — Z15.01 MONOALLELIC MUTATION OF PALB2 GENE: ICD-10-CM

## 2024-09-05 DIAGNOSIS — Z15.89 MONOALLELIC MUTATION OF PALB2 GENE: ICD-10-CM

## 2024-09-05 DIAGNOSIS — Z15.09 MONOALLELIC MUTATION OF PALB2 GENE: ICD-10-CM

## 2024-09-05 PROCEDURE — 255N000002 HC RX 255 OP 636: Performed by: INTERNAL MEDICINE

## 2024-09-05 PROCEDURE — A9585 GADOBUTROL INJECTION: HCPCS | Performed by: INTERNAL MEDICINE

## 2024-09-05 PROCEDURE — 74183 MRI ABD W/O CNTR FLWD CNTR: CPT

## 2024-09-05 RX ORDER — GADOBUTROL 604.72 MG/ML
7.5 INJECTION INTRAVENOUS ONCE
Status: COMPLETED | OUTPATIENT
Start: 2024-09-05 | End: 2024-09-05

## 2024-09-05 RX ADMIN — GADOBUTROL 7.5 ML: 604.72 INJECTION INTRAVENOUS at 12:26

## 2024-09-05 NOTE — TELEPHONE ENCOUNTER
Pre assessment completed for upcoming procedure.   (Please see previous telephone encounter notes for complete details)    Procedure details:    Arrival time and facility location reviewed.    Pre op exam needed? No.    Designated  policy reviewed. Instructed to have someone stay 6  hours post procedure.       Medication review:    Medications reviewed. Please see supporting documentation below. Holding recommendations discussed (if applicable).       Prep for procedure:     Procedure prep instructions reviewed.        Any additional information needed:  N/A      Patient  verbalized understanding and had no questions or concerns at this time.      Hoa Justin RN  Endoscopy Procedure Pre Assessment   [phone] option 4

## 2024-09-09 ENCOUNTER — TRANSFERRED RECORDS (OUTPATIENT)
Dept: HEALTH INFORMATION MANAGEMENT | Facility: CLINIC | Age: 59
End: 2024-09-09
Payer: COMMERCIAL

## 2024-09-11 ENCOUNTER — HOSPITAL ENCOUNTER (OUTPATIENT)
Facility: CLINIC | Age: 59
Discharge: HOME | End: 2024-09-11
Attending: INTERNAL MEDICINE | Admitting: INTERNAL MEDICINE
Payer: COMMERCIAL

## 2024-09-11 VITALS
OXYGEN SATURATION: 95 % | HEIGHT: 68 IN | WEIGHT: 170 LBS | HEART RATE: 78 BPM | SYSTOLIC BLOOD PRESSURE: 120 MMHG | DIASTOLIC BLOOD PRESSURE: 90 MMHG | BODY MASS INDEX: 25.76 KG/M2 | RESPIRATION RATE: 12 BRPM

## 2024-09-11 LAB — COLONOSCOPY: NORMAL

## 2024-09-11 PROCEDURE — G0500 MOD SEDAT ENDO SERVICE >5YRS: HCPCS | Performed by: INTERNAL MEDICINE

## 2024-09-11 PROCEDURE — 45378 DIAGNOSTIC COLONOSCOPY: CPT | Performed by: INTERNAL MEDICINE

## 2024-09-11 PROCEDURE — G0105 COLORECTAL SCRN; HI RISK IND: HCPCS | Performed by: INTERNAL MEDICINE

## 2024-09-11 PROCEDURE — 250N000011 HC RX IP 250 OP 636: Performed by: INTERNAL MEDICINE

## 2024-09-11 RX ORDER — DIPHENHYDRAMINE HYDROCHLORIDE 50 MG/ML
25-50 INJECTION INTRAMUSCULAR; INTRAVENOUS
Status: DISCONTINUED | OUTPATIENT
Start: 2024-09-11 | End: 2024-09-11 | Stop reason: HOSPADM

## 2024-09-11 RX ORDER — FLUMAZENIL 0.1 MG/ML
0.2 INJECTION, SOLUTION INTRAVENOUS
Status: DISCONTINUED | OUTPATIENT
Start: 2024-09-11 | End: 2024-09-11 | Stop reason: HOSPADM

## 2024-09-11 RX ORDER — SIMETHICONE 40MG/0.6ML
133 SUSPENSION, DROPS(FINAL DOSAGE FORM)(ML) ORAL
Status: DISCONTINUED | OUTPATIENT
Start: 2024-09-11 | End: 2024-09-11 | Stop reason: HOSPADM

## 2024-09-11 RX ORDER — NALOXONE HYDROCHLORIDE 0.4 MG/ML
0.4 INJECTION, SOLUTION INTRAMUSCULAR; INTRAVENOUS; SUBCUTANEOUS
Status: DISCONTINUED | OUTPATIENT
Start: 2024-09-11 | End: 2024-09-11 | Stop reason: HOSPADM

## 2024-09-11 RX ORDER — ONDANSETRON 2 MG/ML
4 INJECTION INTRAMUSCULAR; INTRAVENOUS
Status: DISCONTINUED | OUTPATIENT
Start: 2024-09-11 | End: 2024-09-11 | Stop reason: HOSPADM

## 2024-09-11 RX ORDER — ONDANSETRON 2 MG/ML
4 INJECTION INTRAMUSCULAR; INTRAVENOUS EVERY 6 HOURS PRN
Status: DISCONTINUED | OUTPATIENT
Start: 2024-09-11 | End: 2024-09-11 | Stop reason: HOSPADM

## 2024-09-11 RX ORDER — EPINEPHRINE 1 MG/ML
0.1 INJECTION, SOLUTION INTRAMUSCULAR; SUBCUTANEOUS
Status: DISCONTINUED | OUTPATIENT
Start: 2024-09-11 | End: 2024-09-11 | Stop reason: HOSPADM

## 2024-09-11 RX ORDER — NALOXONE HYDROCHLORIDE 0.4 MG/ML
0.2 INJECTION, SOLUTION INTRAMUSCULAR; INTRAVENOUS; SUBCUTANEOUS
Status: DISCONTINUED | OUTPATIENT
Start: 2024-09-11 | End: 2024-09-11 | Stop reason: HOSPADM

## 2024-09-11 RX ORDER — ONDANSETRON 4 MG/1
4 TABLET, ORALLY DISINTEGRATING ORAL EVERY 6 HOURS PRN
Status: DISCONTINUED | OUTPATIENT
Start: 2024-09-11 | End: 2024-09-11 | Stop reason: HOSPADM

## 2024-09-11 RX ORDER — ATROPINE SULFATE 0.1 MG/ML
1 INJECTION INTRAVENOUS
Status: DISCONTINUED | OUTPATIENT
Start: 2024-09-11 | End: 2024-09-11 | Stop reason: HOSPADM

## 2024-09-11 RX ORDER — LIDOCAINE 40 MG/G
CREAM TOPICAL
Status: DISCONTINUED | OUTPATIENT
Start: 2024-09-11 | End: 2024-09-11 | Stop reason: HOSPADM

## 2024-09-11 RX ORDER — PROCHLORPERAZINE MALEATE 10 MG
10 TABLET ORAL EVERY 6 HOURS PRN
Status: DISCONTINUED | OUTPATIENT
Start: 2024-09-11 | End: 2024-09-11 | Stop reason: HOSPADM

## 2024-09-11 RX ORDER — FENTANYL CITRATE 50 UG/ML
50-100 INJECTION, SOLUTION INTRAMUSCULAR; INTRAVENOUS EVERY 5 MIN PRN
Status: DISCONTINUED | OUTPATIENT
Start: 2024-09-11 | End: 2024-09-11 | Stop reason: HOSPADM

## 2024-09-11 RX ADMIN — FENTANYL CITRATE 100 MCG: 50 INJECTION, SOLUTION INTRAMUSCULAR; INTRAVENOUS at 08:47

## 2024-09-11 RX ADMIN — MIDAZOLAM 2 MG: 1 INJECTION INTRAMUSCULAR; INTRAVENOUS at 08:47

## 2024-09-11 RX ADMIN — FENTANYL CITRATE 50 MCG: 50 INJECTION, SOLUTION INTRAMUSCULAR; INTRAVENOUS at 08:53

## 2024-09-11 RX ADMIN — MIDAZOLAM 1 MG: 1 INJECTION INTRAMUSCULAR; INTRAVENOUS at 08:52

## 2024-09-11 RX ADMIN — MIDAZOLAM 1 MG: 1 INJECTION INTRAMUSCULAR; INTRAVENOUS at 08:54

## 2024-09-11 RX ADMIN — FENTANYL CITRATE 50 MCG: 50 INJECTION, SOLUTION INTRAMUSCULAR; INTRAVENOUS at 08:54

## 2024-09-11 ASSESSMENT — ACTIVITIES OF DAILY LIVING (ADL)
ADLS_ACUITY_SCORE: 35
ADLS_ACUITY_SCORE: 35

## 2024-09-11 NOTE — H&P
Pre-Endoscopy History and Physical     Shanna Sutton MRN# [id number]   YOB: 1965 Age: 59 year old     Date of Procedure: 9/11/2024  Primary care provider: Lexi Forrester  Type of Endoscopy: Colonoscopy with possible biopsy, possible polypectomy  Reason for Procedure: polyp  Type of Anesthesia Anticipated: Conscious Sedation    HPI:    Shanna is a 59 year old female who will be undergoing the above procedure.      A history and physical has been performed. The patient's medications and allergies have been reviewed. The risks and benefits of the procedure and the sedation options and risks were discussed with the patient.  All questions were answered and informed consent was obtained.      She denies a personal or family history of anesthesia complications or bleeding disorders.     Patient Active Problem List   Diagnosis    Mixed hyperlipidemia    Benign neoplasm of skin    HYPERLIPIDEMIA LDL GOAL <130    Papanicolaou smear of cervix with atypical squamous cells cannot exclude high grade squamous intraepithelial lesion (ASC-H)    Family history of colon cancer    Gastroesophageal reflux disease, esophagitis presence not specified    Mirena IUD insertion 12/5/2017 - remove on or before 12/5/2022    DUB (dysfunctional uterine bleeding)    Family history of pancreatic cancer    Family history of carrier of genetic disease        Past Medical History:   Diagnosis Date    ASCUS on Pap smear 08/21/2007    - HPV, - HPV, -HPV    Benign neoplasm of skin, site unspecified     Family history of colon cancer     Family history of colon cancer     History of colposcopy with cervical biopsy 03/01/2011    WNL    Infectious mononucleosis     Mixed hyperlipidemia     Slow transit constipation     irritable bowel syndrome  abstracted [id number]        Past Surgical History:   Procedure Laterality Date    BREAST SURGERY  1/11/2017    prophylactic double mastectomy    Mastectomy Bilateral 01/11/2017     "prophylactic double mastectomy: 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, 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       Social History     Tobacco Use    Smoking status: Never    Smokeless tobacco: Never   Substance Use Topics    Alcohol use: Yes     Comment: Rarely       Family History   Problem Relation Age of Onset    Breast Cancer Mother         age 65    Cancer - colorectal Mother         age 73    Colon Cancer Mother     Respiratory Father         COPD    Skin Cancer Father     Pancreatic Cancer Sister     Family History Negative Sister         5-healthy    Breast Cancer Sister     Breast Cancer Sister         diagnosed at age 43    Thyroid Disease Sister         3 sisters    Breast Cancer Sister         diagnosed at age 58    Breast Cancer Sister     Thyroid Disease Sister     Family History Negative Brother         6-healthy       Prior to Admission medications    Medication Sig Start Date End Date Taking? Authorizing Provider   atorvastatin (LIPITOR) 20 MG tablet Take 1 tablet (20 mg) by mouth daily 5/30/24  Yes Lexi Forrester MD   multivitamin w/minerals (THERA-VIT-M) tablet Take 1 tablet by mouth daily   Yes Reported, Patient   omeprazole (PRILOSEC) 40 MG DR capsule TAKE 1 CAPSULE BY MOUTH EVERY DAY 2/6/24  Yes Lexi Forrester MD   Vitamin D3 (CHOLECALCIFEROL) 25 mcg (1000 units) tablet Take by mouth daily   Yes Reported, Patient   clobetasol (TEMOVATE) 0.05 % external ointment Apply a thin layer to affected area twice a day for 4 weeks, then twice a week as directed.  Patient not taking: Reported on 1/24/2024 6/23/22   Nikole Yarbrough MD       Allergies   Allergen Reactions    Amoxicillin Hives        REVIEW OF SYSTEMS:   5 point ROS negative except as noted above in HPI, including Gen., Resp., CV, GI &  system review.    PHYSICAL EXAM:   There were no vitals taken for this visit. Estimated body mass index is 25.85 kg/m  as calculated from the following:    Height as of 4/26/24: 1.727 m (5' 8\").    Weight as of 4/26/24: 77.1 " kg (170 lb).   GENERAL APPEARANCE: alert, and oriented  MENTAL STATUS: alert  AIRWAY EXAM: Mallampatti Class I (visualization of the soft palate, fauces, uvula, anterior and posterior pillars)  RESP: lungs clear to auscultation - no rales, rhonchi or wheezes  CV: regular rates and rhythm  DIAGNOSTICS:    Not indicated    IMPRESSION   ASA Class 2 - Mild systemic disease    PLAN:   Plan for Colonoscopy with possible biopsy, possible polypectomy. We discussed the risks, benefits and alternatives and the patient wished to proceed.    The above has been forwarded to the consulting provider.      Signed Electronically by: Hank Swift MD  September 11, 2024

## 2024-12-26 ENCOUNTER — PATIENT OUTREACH (OUTPATIENT)
Dept: CARE COORDINATION | Facility: CLINIC | Age: 59
End: 2024-12-26
Payer: COMMERCIAL

## 2024-12-31 ENCOUNTER — TELEPHONE (OUTPATIENT)
Dept: INTERNAL MEDICINE | Facility: CLINIC | Age: 59
End: 2024-12-31
Payer: COMMERCIAL

## 2024-12-31 NOTE — TELEPHONE ENCOUNTER
Patient Quality Outreach    Patient is due for the following:   Mammogram has been DISCONTINUED    Action(s) Taken:   No follow up needed at this time.   mammogram has been DISCONTINUED    Type of outreach:    NONE    Questions for provider review:    None           Laura Staples MA  Chart routed to none.

## 2025-01-09 ENCOUNTER — PATIENT OUTREACH (OUTPATIENT)
Dept: CARE COORDINATION | Facility: CLINIC | Age: 60
End: 2025-01-09
Payer: COMMERCIAL

## 2025-02-06 ENCOUNTER — APPOINTMENT (OUTPATIENT)
Dept: MRI IMAGING | Facility: CLINIC | Age: 60
End: 2025-02-06
Attending: PSYCHIATRY & NEUROLOGY
Payer: COMMERCIAL

## 2025-02-06 ENCOUNTER — APPOINTMENT (OUTPATIENT)
Dept: CT IMAGING | Facility: CLINIC | Age: 60
End: 2025-02-06
Attending: EMERGENCY MEDICINE
Payer: COMMERCIAL

## 2025-02-06 ENCOUNTER — HOSPITAL ENCOUNTER (INPATIENT)
Facility: CLINIC | Age: 60
End: 2025-02-06
Attending: EMERGENCY MEDICINE | Admitting: STUDENT IN AN ORGANIZED HEALTH CARE EDUCATION/TRAINING PROGRAM
Payer: COMMERCIAL

## 2025-02-06 DIAGNOSIS — R20.2 PARESTHESIA OF LEFT UPPER AND LOWER EXTREMITY: ICD-10-CM

## 2025-02-06 DIAGNOSIS — R29.898 LEFT LEG WEAKNESS: ICD-10-CM

## 2025-02-06 DIAGNOSIS — I63.9 ACUTE CVA (CEREBROVASCULAR ACCIDENT) (H): ICD-10-CM

## 2025-02-06 LAB
ANION GAP SERPL CALCULATED.3IONS-SCNC: 12 MMOL/L (ref 7–15)
APTT PPP: 27 SECONDS (ref 22–38)
ATRIAL RATE - MUSE: 90 BPM
BASOPHILS # BLD AUTO: 0.1 10E3/UL (ref 0–0.2)
BASOPHILS NFR BLD AUTO: 1 %
BUN SERPL-MCNC: 14.4 MG/DL (ref 6–20)
CALCIUM SERPL-MCNC: 10.3 MG/DL (ref 8.8–10.4)
CHLORIDE SERPL-SCNC: 101 MMOL/L (ref 98–107)
CREAT SERPL-MCNC: 0.76 MG/DL (ref 0.51–0.95)
DIASTOLIC BLOOD PRESSURE - MUSE: NORMAL MMHG
EGFRCR SERPLBLD CKD-EPI 2021: >90 ML/MIN/1.73M2
EOSINOPHIL # BLD AUTO: 0.2 10E3/UL (ref 0–0.7)
EOSINOPHIL NFR BLD AUTO: 2 %
ERYTHROCYTE [DISTWIDTH] IN BLOOD BY AUTOMATED COUNT: 11.8 % (ref 10–15)
EST. AVERAGE GLUCOSE BLD GHB EST-MCNC: 114 MG/DL
GLUCOSE BLDC GLUCOMTR-MCNC: 77 MG/DL (ref 70–99)
GLUCOSE SERPL-MCNC: 92 MG/DL (ref 70–99)
HBA1C MFR BLD: 5.6 %
HCO3 SERPL-SCNC: 27 MMOL/L (ref 22–29)
HCT VFR BLD AUTO: 44.9 % (ref 35–47)
HGB BLD-MCNC: 15.3 G/DL (ref 11.7–15.7)
IMM GRANULOCYTES # BLD: 0.1 10E3/UL
IMM GRANULOCYTES NFR BLD: 1 %
INR PPP: 0.94 (ref 0.85–1.15)
INTERPRETATION ECG - MUSE: NORMAL
LYMPHOCYTES # BLD AUTO: 2.4 10E3/UL (ref 0.8–5.3)
LYMPHOCYTES NFR BLD AUTO: 23 %
MCH RBC QN AUTO: 29.9 PG (ref 26.5–33)
MCHC RBC AUTO-ENTMCNC: 34.1 G/DL (ref 31.5–36.5)
MCV RBC AUTO: 88 FL (ref 78–100)
MONOCYTES # BLD AUTO: 0.6 10E3/UL (ref 0–1.3)
MONOCYTES NFR BLD AUTO: 6 %
NEUTROPHILS # BLD AUTO: 6.9 10E3/UL (ref 1.6–8.3)
NEUTROPHILS NFR BLD AUTO: 68 %
NRBC # BLD AUTO: 0 10E3/UL
NRBC BLD AUTO-RTO: 0 /100
P AXIS - MUSE: NORMAL DEGREES
PLATELET # BLD AUTO: 313 10E3/UL (ref 150–450)
POTASSIUM SERPL-SCNC: 4.3 MMOL/L (ref 3.4–5.3)
PR INTERVAL - MUSE: 186 MS
QRS DURATION - MUSE: 78 MS
QT - MUSE: 360 MS
QTC - MUSE: 440 MS
R AXIS - MUSE: -2 DEGREES
RBC # BLD AUTO: 5.12 10E6/UL (ref 3.8–5.2)
SODIUM SERPL-SCNC: 140 MMOL/L (ref 135–145)
SYSTOLIC BLOOD PRESSURE - MUSE: NORMAL MMHG
T AXIS - MUSE: 141 DEGREES
TROPONIN T SERPL HS-MCNC: <6 NG/L
VENTRICULAR RATE- MUSE: 90 BPM
WBC # BLD AUTO: 10.1 10E3/UL (ref 4–11)

## 2025-02-06 PROCEDURE — 93005 ELECTROCARDIOGRAM TRACING: CPT

## 2025-02-06 PROCEDURE — 96374 THER/PROPH/DIAG INJ IV PUSH: CPT | Mod: 59

## 2025-02-06 PROCEDURE — 85610 PROTHROMBIN TIME: CPT | Performed by: EMERGENCY MEDICINE

## 2025-02-06 PROCEDURE — 99291 CRITICAL CARE FIRST HOUR: CPT | Mod: GC | Performed by: PSYCHIATRY & NEUROLOGY

## 2025-02-06 PROCEDURE — 99291 CRITICAL CARE FIRST HOUR: CPT | Mod: 25

## 2025-02-06 PROCEDURE — 36415 COLL VENOUS BLD VENIPUNCTURE: CPT | Performed by: EMERGENCY MEDICINE

## 2025-02-06 PROCEDURE — 82374 ASSAY BLOOD CARBON DIOXIDE: CPT | Performed by: EMERGENCY MEDICINE

## 2025-02-06 PROCEDURE — 70450 CT HEAD/BRAIN W/O DYE: CPT

## 2025-02-06 PROCEDURE — 250N000009 HC RX 250: Performed by: EMERGENCY MEDICINE

## 2025-02-06 PROCEDURE — A9585 GADOBUTROL INJECTION: HCPCS | Performed by: PSYCHIATRY & NEUROLOGY

## 2025-02-06 PROCEDURE — 70553 MRI BRAIN STEM W/O & W/DYE: CPT

## 2025-02-06 PROCEDURE — 85041 AUTOMATED RBC COUNT: CPT | Performed by: EMERGENCY MEDICINE

## 2025-02-06 PROCEDURE — 85018 HEMOGLOBIN: CPT | Performed by: EMERGENCY MEDICINE

## 2025-02-06 PROCEDURE — 120N000013 HC R&B IMCU

## 2025-02-06 PROCEDURE — 85730 THROMBOPLASTIN TIME PARTIAL: CPT | Performed by: EMERGENCY MEDICINE

## 2025-02-06 PROCEDURE — 255N000002 HC RX 255 OP 636: Performed by: PSYCHIATRY & NEUROLOGY

## 2025-02-06 PROCEDURE — 250N000009 HC RX 250: Performed by: PSYCHIATRY & NEUROLOGY

## 2025-02-06 PROCEDURE — 70496 CT ANGIOGRAPHY HEAD: CPT

## 2025-02-06 PROCEDURE — 82962 GLUCOSE BLOOD TEST: CPT

## 2025-02-06 PROCEDURE — 83036 HEMOGLOBIN GLYCOSYLATED A1C: CPT | Performed by: STUDENT IN AN ORGANIZED HEALTH CARE EDUCATION/TRAINING PROGRAM

## 2025-02-06 PROCEDURE — 37195 THROMBOLYTIC THERAPY STROKE: CPT

## 2025-02-06 PROCEDURE — 80048 BASIC METABOLIC PNL TOTAL CA: CPT | Performed by: EMERGENCY MEDICINE

## 2025-02-06 PROCEDURE — 85004 AUTOMATED DIFF WBC COUNT: CPT | Performed by: EMERGENCY MEDICINE

## 2025-02-06 PROCEDURE — 84484 ASSAY OF TROPONIN QUANT: CPT | Performed by: EMERGENCY MEDICINE

## 2025-02-06 PROCEDURE — 250N000011 HC RX IP 250 OP 636: Performed by: EMERGENCY MEDICINE

## 2025-02-06 PROCEDURE — 99222 1ST HOSP IP/OBS MODERATE 55: CPT | Performed by: STUDENT IN AN ORGANIZED HEALTH CARE EDUCATION/TRAINING PROGRAM

## 2025-02-06 PROCEDURE — 250N000011 HC RX IP 250 OP 636: Mod: JZ | Performed by: PSYCHIATRY & NEUROLOGY

## 2025-02-06 RX ORDER — ATORVASTATIN CALCIUM 20 MG/1
20 TABLET, FILM COATED ORAL DAILY
Status: DISCONTINUED | OUTPATIENT
Start: 2025-02-07 | End: 2025-02-07 | Stop reason: HOSPADM

## 2025-02-06 RX ORDER — GADOBUTROL 604.72 MG/ML
7 INJECTION INTRAVENOUS ONCE
Status: COMPLETED | OUTPATIENT
Start: 2025-02-06 | End: 2025-02-06

## 2025-02-06 RX ORDER — LIDOCAINE 40 MG/G
CREAM TOPICAL
Status: DISCONTINUED | OUTPATIENT
Start: 2025-02-06 | End: 2025-02-07 | Stop reason: HOSPADM

## 2025-02-06 RX ORDER — LABETALOL HYDROCHLORIDE 5 MG/ML
10 INJECTION, SOLUTION INTRAVENOUS EVERY 10 MIN PRN
Status: DISCONTINUED | OUTPATIENT
Start: 2025-02-06 | End: 2025-02-07 | Stop reason: HOSPADM

## 2025-02-06 RX ORDER — LABETALOL HYDROCHLORIDE 5 MG/ML
10 INJECTION, SOLUTION INTRAVENOUS EVERY 10 MIN PRN
Status: DISCONTINUED | OUTPATIENT
Start: 2025-02-06 | End: 2025-02-06

## 2025-02-06 RX ORDER — PANTOPRAZOLE SODIUM 40 MG/1
40 TABLET, DELAYED RELEASE ORAL
Status: DISCONTINUED | OUTPATIENT
Start: 2025-02-07 | End: 2025-02-07 | Stop reason: HOSPADM

## 2025-02-06 RX ORDER — HYDRALAZINE HYDROCHLORIDE 20 MG/ML
10 INJECTION INTRAMUSCULAR; INTRAVENOUS EVERY 30 MIN PRN
Status: DISCONTINUED | OUTPATIENT
Start: 2025-02-06 | End: 2025-02-07 | Stop reason: HOSPADM

## 2025-02-06 RX ORDER — SODIUM CHLORIDE 9 MG/ML
INJECTION, SOLUTION INTRAVENOUS CONTINUOUS PRN
Status: DISCONTINUED | OUTPATIENT
Start: 2025-02-06 | End: 2025-02-07

## 2025-02-06 RX ORDER — HYDRALAZINE HYDROCHLORIDE 20 MG/ML
10 INJECTION INTRAMUSCULAR; INTRAVENOUS EVERY 10 MIN PRN
Status: DISCONTINUED | OUTPATIENT
Start: 2025-02-06 | End: 2025-02-06

## 2025-02-06 RX ORDER — IOPAMIDOL 755 MG/ML
67 INJECTION, SOLUTION INTRAVASCULAR ONCE
Status: COMPLETED | OUTPATIENT
Start: 2025-02-06 | End: 2025-02-06

## 2025-02-06 RX ADMIN — IOPAMIDOL 67 ML: 755 INJECTION, SOLUTION INTRAVENOUS at 13:17

## 2025-02-06 RX ADMIN — GADOBUTROL 7 ML: 604.72 INJECTION INTRAVENOUS at 17:46

## 2025-02-06 RX ADMIN — Medication 18.5 MG: at 14:03

## 2025-02-06 RX ADMIN — SODIUM CHLORIDE 100 ML: 9 INJECTION, SOLUTION INTRAVENOUS at 13:17

## 2025-02-06 ASSESSMENT — ACTIVITIES OF DAILY LIVING (ADL)
ADLS_ACUITY_SCORE: 16
ADLS_ACUITY_SCORE: 41
ADLS_ACUITY_SCORE: 15
ADLS_ACUITY_SCORE: 41
ADLS_ACUITY_SCORE: 16
ADLS_ACUITY_SCORE: 41
ADLS_ACUITY_SCORE: 15

## 2025-02-06 ASSESSMENT — COLUMBIA-SUICIDE SEVERITY RATING SCALE - C-SSRS
1. IN THE PAST MONTH, HAVE YOU WISHED YOU WERE DEAD OR WISHED YOU COULD GO TO SLEEP AND NOT WAKE UP?: NO
6. HAVE YOU EVER DONE ANYTHING, STARTED TO DO ANYTHING, OR PREPARED TO DO ANYTHING TO END YOUR LIFE?: NO
2. HAVE YOU ACTUALLY HAD ANY THOUGHTS OF KILLING YOURSELF IN THE PAST MONTH?: NO

## 2025-02-06 NOTE — H&P
"[facility]    History and Physical - Hospitalist Service       Date of Admission:  2/6/2025    Assessment & Plan      Shanna Sutton is a 59 year old female admitted on 2/6/2025 as a stroke rule out with L sided weakness and numbness.    L sided weakness and L sided numbness   Facial numbness  Developed left arm and leg weakness around 9am. She continued to work and mostly ignored her symptoms however around 11am she decided to go to urgent care. Enroute she developed L facial numbness and full L sided numbness. Presented to the ER ~1pm as code stroke. CTA head and neck negative for large vessel occlusion or CVA. Neurology evaluated and recommended TNK which was pushed at 1403.    - admit to [facility] [place] [facility] via post TNK pathway, per neurology recommendations  - q2h neuro checks, follow on tele  - permissive HTN with PRNs available  - MRI brain tomorrow  - A1C now and fasting lipid profile in AM  - TTE  - NPO until beside eval  - SLP, OT and PT consults  - hold asa or other antiplatelets  - resume home statin for now    Elevated HR  Patient's iwatch alerted her while she was resting during her stroke symptoms that her heart rate was 110-140s. She denies feeling any palpitation but felt \"not right\"  - NSR here  - monitor on tele  - check troponin  - follow TTE    HLD  - statin therapy as above            Diet: NPO for Medical/Clinical Reasons Except for: No Exceptions    DVT Prophylaxis: Pneumatic Compression Devices  Matute Catheter: Not present  Lines: None     Cardiac Monitoring: None  Code Status:  FULL    Clinically Significant Risk Factors Present on Admission                                        Disposition Plan     Medically Ready for Discharge: Anticipated in 2-4 Days 1-2 days after stroke work up           Siomara Roque DO  Hospitalist Service  [facility]  Securely message with apomio (more info)  Text page via R[employer] Paging/Directory " "    ______________________________________________________________________    Chief Complaint   L sided weakness    History is obtained from the patient    History of Present Illness   Shanna Sutton is a 59 year old female admitted on 2/6/2025 as a stroke rule out with L sided weakness and numbness.    Patient reports she was at work when about 9AM she developed some L leg and arm weakness. Nothing to make her fall but noticeable. She mostly ignored it and even drove and went grocery shopping. She laid down to rest and her watch alerted her to a fast heart rate that she did not notice. She did feel just \"not right\" thought. No headaches. No blurry vision. She called her  who took her to urgent care enroute she developed more concerning facial numbness and L sided numbness and was brought to the ER. No prior history of DM or HTN she is aware of. She illness or change in meds. Planning to travel on vacation tonight      Past Medical History    Past Medical History:   Diagnosis Date    ASCUS on Pap smear 08/21/2007    - HPV, - HPV, -HPV    Benign neoplasm of skin, site unspecified     Family history of colon cancer     Family history of colon cancer     History of colposcopy with cervical biopsy 03/01/2011    WNL    Infectious mononucleosis     Mixed hyperlipidemia     Slow transit constipation     irritable bowel syndrome  abstracted [id number]       Past Surgical History   Past Surgical History:   Procedure Laterality Date    BREAST SURGERY  1/11/2017    prophylactic double mastectomy    COLONOSCOPY N/A 9/11/2024    Procedure: Colonoscopy;  Surgeon: Hank Swift MD;  Location: [facility]    Mastectomy Bilateral 01/11/2017    prophylactic double mastectomy: 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, 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 Taking?   Vitamin D3 (CHOLECALCIFEROL) 25 mcg (1000 units) tablet 2/6/2025  Yes Yes   Sig: Take by " mouth daily   atorvastatin (LIPITOR) 20 MG tablet 2/6/2025  No Yes   Sig: TAKE 1 TABLET BY MOUTH EVERY DAY   multivitamin w/minerals (THERA-VIT-M) tablet 2/6/2025  Yes Yes   Sig: Take 1 tablet by mouth daily   omeprazole (PRILOSEC) 40 MG DR capsule 2/6/2025  No Yes   Sig: TAKE 1 CAPSULE BY MOUTH EVERY DAY      Facility-Administered Medications: None        Review of Systems    The 10 point Review of Systems is negative other than noted in the HPI or here.      Physical Exam   Vital Signs: Temp: 97  F (36.1  C) Temp src: Temporal BP: 137/85 Pulse: 83   Resp: 26 SpO2: 98 %      Weight: 162 lbs 11.19 oz    Constitutional: Awake, alert, cooperative, no apparent distress.  Eyes: Conjunctiva and pupils examined and normal.  HEENT: Moist mucous membranes, normal dentition.slight facial droop present on L  Respiratory: Clear to auscultation bilaterally, no crackles or wheezing.  Cardiovascular: Regular rate and rhythm, normal S1 and S2, and no murmur noted.  GI: Soft, non-distended, non-tender, normal bowel sounds.  Lymph/Hematologic: No anterior cervical or supraclavicular adenopathy.  Skin: No rashes, no cyanosis, no edema.  Musculoskeletal: No joint swelling, erythema or tenderness.  Neurologic: 4/5 weakness throughout L side, slight L facial droop but CN2-12 grossly intact otherwise  Psychiatric: Alert, oriented to person, place and time, no obvious anxiety or depression.     Medical Decision Making       65 MINUTES SPENT BY ME on the date of service doing chart review, history, exam, documentation & further activities per the note.      Data     I have personally reviewed the following data over the past 24 hrs:    10.1  \   15.3   / 313     140 101 14.4 /  77   4.3 27 0.76 \     INR:  0.94 PTT:  27   D-dimer:  N/A Fibrinogen:  N/A       Imaging results reviewed over the past 24 hrs:   Recent Results (from the past 24 hours)   CT Head w/o Contrast    Narrative    EXAM: CT HEAD W/O CONTRAST  LOCATION: [facility]  DATE: 2/6/2025    INDICATION: Code Stroke to evaluate for potential thrombolysis and thrombectomy. PLEASE READ IMMEDIATELY.  COMPARISON: None.  TECHNIQUE: Routine CT Head without IV contrast. Multiplanar reformats. Dose reduction techniques were used.    FINDINGS:  INTRACRANIAL CONTENTS: No evidence of acute intracranial hemorrhage or mass effect. Brain attenuation and morphology are normal. The ventricles and sulci are normal for age. Normal gray-white matter differentiation. The basilar cisterns are patent.    VISUALIZED ORBITS/SINUSES/MASTOIDS: The globes are unremarkable. The partially imaged paranasal sinuses, mastoid air cells and middle ear cavities are unremarkable.     BONES/SOFT TISSUES: The visualized skull base and calvarium are unremarkable.      Impression    IMPRESSION:    1.  No evidence of acute intracranial hemorrhage or mass effect.   CTA Head Neck with Contrast    Narrative    EXAM: CTA HEAD NECK W CONTRAST  LOCATION: [facility]  DATE: 2/6/2025    INDICATION: Code Stroke to evaluate for potential thrombolysis and thrombectomy. PLEASE READ IMMEDIATELY.  COMPARISON: None.  CONTRAST: 67mL Isovue 370  TECHNIQUE: Head and neck CT angiogram with IV contrast. Noncontrast head CT followed by axial helical CT images of the head and neck vessels obtained during the arterial phase of intravenous contrast administration. Axial 2D reconstructed images and   multiplanar 3D MIP reconstructed images of the head and neck vessels were performed by the technologist. Dose reduction techniques were used. All stenosis measurements made according to NASCET criteria unless otherwise specified.    FINDINGS:   HEAD CTA:  Examination of the anterior circulation demonstrates flow within the bilateral internal carotid and proximal aspects of the anterior middle cerebral arteries. The anterior communicating artery is demonstrated.    Examination of the posterior circulation  demonstrates flow within the distal vertebral, basilar, and proximal aspects of the posterior cerebral arteries. The right and left posterior communicating arteries are not demonstrated with certainty.    No evidence of pathologic vascular occlusion or saccular aneurysm within the visualized intracranial circulation by CT angiography.    NECK CTA:  The aortic arch has normal branching configuration. There is flow within the brachiocephalic, common carotid, proximal aspects of the subclavian arteries.    The right common carotid artery is patent. Examination of the right carotid bulb demonstrates no evidence of hemodynamically American stenosis within the right internal carotid artery within the neck.    The left common carotid artery is patent. Examination of the left carotid bulb demonstrates no evidence of hemodynamically significant stenosis within the left internal carotid artery within the neck.    The right and left vertebral arteries are patent.    No evidence of arterial dissection.    The parotid, submandibular and thyroid glands are unremarkable.    No evidence of cervical lymphadenopathy by size or morphologic criteria.    The partially imaged lung apices are unremarkable. No suspicious osseous lesions.      Impression    IMPRESSION:   HEAD CTA:   1.  No evidence of pathologic vascular occlusion or saccular aneurysm within the visualized intracranial circulation by CT angiography.    NECK CTA:  1.  No evidence of hemodynamically significant stenosis within either internal carotid artery within the neck.    Dr. Baez discussed the results with Dr. Hernandez on 2/6/2025 1:41 PM CST by telephone.

## 2025-02-06 NOTE — CONSULTS
"[facility]     Stroke Code Note          History of Present Illness     Chief Complaint: Palpitations and Generalized Weakness      Shanna Sutton is a 59 year old female who presents with left upper and lower extremity numbness and weakness. Reports feeling some lightheadedness that began 0930 this morning. She went to work and felt fine, but felt unilateral numbness and tingling that began in her face at 1130. Last known well before focal neurological deficits was 11:30. This then progressed to involve her left upper extremity and left lower extremity, she had associated diminished sensation to these areas. She felt like her heart was racing at this time as well. Sought evaluation at urgent care, who subsequently sent to the Emergency Department for evaluation.  She is not on any antiplatelet or anticoagulation at home. Denies any recent surgeries    On admission, /87, labs with BS 77, CBC and CMP unremarkable, PTT/INR within normal limits, CT head was negative for any acute hemorrhage, CTA head and neck was negative for any large vessel occlusion or any hemodynamic stenosis, discussed treatment options including TNK versus dual antiplatelet therapy with patient, patient elected for TNK, TNK was administered at 1403.          Past Medical History     Stroke risk factors: HLD    Preadmission antithrombotic regimen: none    Modified [place] Score (Pre-morbid)  0-No deficits                 Assessment and Plan       1. Possible ischemic stroke   2. L sided numbness/weakness        Intravenous Thrombolysis  Risks (including potential for bleeding and death), benefits, and alternatives to thrombolytic therapy were discussed with Patient and Family. Tenecteplase (TNK) administered without delay.     Endovascular Treatment  Not initiated due to absence of proximal vessel occlusion     Plan:  - Use orderset: \"Ischemic Stroke Post-Thrombolytics/Thrombectomy ICU Admission\"  - Neurochecks " "and vital signs per post-thrombolytic orders and monitor closely for any evidence of CNS hemorrhage, bleeding, or orolingual angioedema  - Goal BP  <180/105  - Hold all antithrombotic and anticoagulant medications for 24 hrs post-thrombolytic  - Hold pharmacologic VTE prophylaxis for 24 hrs post-thrombolytic  - Statin: Continue home statin  - Repeat Head CT 24 hrs post-thrombolytic  - MRI Brain with and without contrast  - Telemetry, EKG  - Bedside Glucose Monitoring  - A1c, Lipid Panel, Troponin x 3  - PT/OT/SLP  - Stroke Education  - Euthermia, Euglycemia     ___________________________________________________________________    The Stroke Fellow is Dr. Agee. The Stroke Staff is Dr. Aragon.    Haroon Cornelius  Medical Student  To page a member of the stroke/neurocritical care service, click here:  [facility]   Choose \"On Call\" tab at top, then search dropdown box for \"Neurology Adult\", select location, press Enter, then look for stroke/neuro ICU/telestroke.  ___________________________________________________________________        Imaging/Labs   (personally reviewed)    CT head: No evidence of acute intracranial hemorrhage or mass effect  CTA head/neck: No evidence of pathologic vascular occlusion or saccular aneurysm within the visualized intracranial circulation by CT angiography. No evidence of hemodynamically significant stenosis within either internal carotid artery within the neck.         Physical Examination     BP: 137/85   Pulse: 83   Resp: 26   Temp: 97  F (36.1  C)   Temp src: Temporal   SpO2: 98 %       Weight: 73.8 kg (162 lb 11.2 oz)    Wt Readings from Last 2 Encounters:   02/06/25 73.8 kg (162 lb 11.2 oz)   09/11/24 77.1 kg (170 lb)     General Exam  General:  patient lying in bed without any acute distress    HEENT:  normocephalic/atraumatic  Pulmonary:  Not in respiratory distress, no increased work of breathing.    Neuro Exam  Mental Status:  alert, oriented to person, place, and time. follows " commands, speech clear and fluent, naming and repetition normal  Cranial Nerves:  visual fields intact, EOMI with normal smooth pursuit, decreased to light touch on left V1 V2 and V3, facial movements symmetric, hearing not formally tested but intact to conversation, no dysarthria, shoulder shrug equal bilaterally, tongue protrusion midline  Motor:  no abnormal movements, able to move all limbs antigravity spontaneously, full in right upper and lower extremities, 5 -/5 in left upper extremity and left leg weakness 4+/5  Reflexes:  deferred.  Sensory: Decreased to light touch in left upper and lower extremities  Coordination:  normal finger-to-nose and heel-to-shin bilaterally without dysmetria  Station/Gait:   Deferred        Stroke Scales       NIHSS  1a. Level of Consciousness  0   1b. LOC Questions  0   1c. LOC Commands  0   2.   Best Gaze  0   3.   Visual  0   4.   Facial Palsy  0   5a. Motor Arm, Left  0   5b. Motor Arm, Right  0   6a. Motor Leg, Left  1   6b. Motor Leg, right  0   7.   Limb Ataxia  0   8.   Sensory  1   9.   Best Language  0   10. Dysarthria  0   11. Extinction and Inattention   0   Total (2)          Labs     CBC  Lab Results   Component Value Date    HGB 15.3 02/06/2025    HCT 44.9 02/06/2025    WBC 10.1 02/06/2025     02/06/2025       BMP  Lab Results   Component Value Date     02/06/2025    POTASSIUM 4.3 02/06/2025    CHLORIDE 101 02/06/2025    CO2 27 02/06/2025    BUN 14.4 02/06/2025    CR 0.76 02/06/2025    GLC 77 02/06/2025    ALICIA 10.3 02/06/2025       INR  INR   Date Value Ref Range Status   02/06/2025 0.94 0.85 - 1.15 Final       Data   Stroke Code Data  (for stroke code without tele)  Stroke code activated 02/06/25  1316   First stroke provider response 02/06/25  1316   Last known normal 02/06/25  1130   Time of discovery (or onset of symptoms) 02/06/25  1130   Head CT read by Stroke Neuro Provider 02/06/25  1330   Was stroke code de-escalated? No              Clinically  Significant Risk Factors Present on Admission                                         Time Spent on this Encounter

## 2025-02-06 NOTE — PHARMACY-ADMISSION MEDICATION HISTORY
Pharmacist Admission Medication History    Admission medication history is complete. The information provided in this note is only as accurate as the sources available at the time of the update.    Information Source(s): Patient and CareEverywhere/SureScripts via in-person    Pertinent Information: None    Changes made to PTA medication list:  Added: None  Deleted: Temovate  Changed: None    Allergies reviewed with patient and updates made in EHR: yes    Medication History Completed By: Mily Vazquez RP 2/6/2025 2:20 PM    PTA Med List   Medication Sig Last Dose/Taking    atorvastatin (LIPITOR) 20 MG tablet TAKE 1 TABLET BY MOUTH EVERY DAY 2/6/2025    multivitamin w/minerals (THERA-VIT-M) tablet Take 1 tablet by mouth daily 2/6/2025    omeprazole (PRILOSEC) 40 MG DR capsule TAKE 1 CAPSULE BY MOUTH EVERY DAY 2/6/2025    Vitamin D3 (CHOLECALCIFEROL) 25 mcg (1000 units) tablet Take by mouth daily 2/6/2025

## 2025-02-06 NOTE — ED PROVIDER NOTES
"  Emergency Department Note      History of Present Illness     Chief Complaint   Palpitations and Generalized Weakness    HPI   Shanna Sutton is a 59 year old female with a history of hyperlipidemia presenting to the ED for the evaluation of numbness, tingling, and left-sided weakness. The patient states that she began experiencing the weakness at 0915 this morning which was soon after joined by the numbness and tingling sensation. Shanna also reports that the outside of her eyes feel heavy and droopy, though she denies evident facial drooping. Unrelated to these symptoms, she also mentions rhinorrhea occurring earlier today. She denies headache, abdominal pain, nausea, vomiting, diarrhea, cough, chest pain or pressure, difficulty hearing, confusion, or slurred speech. She also denies history of stroke, MI, blood thinner use, or recent falls or head injuries. Regarding medications, Shanna is on atorvastatin and omeprazole.    Independent Historian   None    Review of External Notes   I read her urgent care visit from earlier today.    Past Medical History     Medical History and Problem List   ASCUS on pap smear  Benign skin neoplasm  Dysfunctional uterine bleeding  Infectious mononucleosis  High risk for breast cancer  Hyperlipidemia  GERD  Gene mutation    Medications   Atorvastatin  Methylprednisolone  Omeprazole    Surgical History   Double mastectomy  Colonoscopy    Physical Exam     Patient Vitals for the past 24 hrs:   BP Temp Temp src Pulse Resp SpO2 Height Weight   02/06/25 1403 137/85 -- -- -- -- -- -- --   02/06/25 1401 137/85 -- -- 83 -- 98 % -- --   02/06/25 1354 -- -- -- -- -- -- -- 73.8 kg (162 lb 11.2 oz)   02/06/25 1345 (!) 139/97 -- -- 101 26 -- -- --   02/06/25 1256 (!) 143/78 97  F (36.1  C) Temporal 90 16 98 % 1.727 m (5' 8\") 73.9 kg (163 lb)     Physical Exam  Constitutional: Vital signs reviewed as above  General: Alert, pleasant  HEENT: Moist mucous membranes  Eyes: Pupils are equal, " round, and reactive to light.   Neck: Normal range of motion  Cardiovascular: normal rate, Regular rhythm and normal heart sounds.  Mo MRG  Pulmonary/Chest: Effort normal and breath sounds normal. No respiratory distress. Patient has no wheezes. Patient has no rales.   Gastrointestinal: Soft. Positive bowel sounds. No MRG.  Musculoskeletal/Extremities: Full ROM.  Endo: No pitting edema  Neurological: A/O x 3. CN-II-XII intact bilaterally. No pronator drift. Normal strength and sensation in right arm and right leg. Diminished sensation to the left arm and left leg. 5 out of 5 strength on bilateral upper extremities and right lower extremity. 4.5 out of 5 strength in the left lower extremity.  Skin: Skin is warm and dry.   Psychiatric: Pleasant     Diagnostics     Lab Results   Labs Ordered and Resulted from Time of ED Arrival to Time of ED Departure   BASIC METABOLIC PANEL - Normal       Result Value    Sodium 140      Potassium 4.3      Chloride 101      Carbon Dioxide (CO2) 27      Anion Gap 12      Urea Nitrogen 14.4      Creatinine 0.76      GFR Estimate >90      Calcium 10.3      Glucose 92     INR - Normal    INR 0.94     PARTIAL THROMBOPLASTIN TIME - Normal    aPTT 27     GLUCOSE BY METER - Normal    GLUCOSE BY METER POCT 77     GLUCOSE MONITOR NURSING POCT   CBC WITH PLATELETS AND DIFFERENTIAL    WBC Count 10.1      RBC Count 5.12      Hemoglobin 15.3      Hematocrit 44.9      MCV 88      MCH 29.9      MCHC 34.1      RDW 11.8      Platelet Count 313      % Neutrophils 68      % Lymphocytes 23      % Monocytes 6      % Eosinophils 2      % Basophils 1      % Immature Granulocytes 1      NRBCs per 100 WBC 0      Absolute Neutrophils 6.9      Absolute Lymphocytes 2.4      Absolute Monocytes 0.6      Absolute Eosinophils 0.2      Absolute Basophils 0.1      Absolute Immature Granulocytes 0.1      Absolute NRBCs 0.0     COMPREHENSIVE METABOLIC PANEL   TROPONIN T, HIGH SENSITIVITY   CBC WITH PLATELETS AND  DIFFERENTIAL       Imaging   CTA Head Neck with Contrast   Final Result   IMPRESSION:    HEAD CTA:    1.  No evidence of pathologic vascular occlusion or saccular aneurysm within the visualized intracranial circulation by CT angiography.      NECK CTA:   1.  No evidence of hemodynamically significant stenosis within either internal carotid artery within the neck.      Dr. Baez discussed the results with Dr. Hernandez on 2/6/2025 1:41 PM CST by telephone.      CT Head w/o Contrast   Final Result   IMPRESSION:     1.  No evidence of acute intracranial hemorrhage or mass effect.          EKG   ECG results from 02/06/25   EKG 12-lead, tracing only     Value    Systolic Blood Pressure     Diastolic Blood Pressure     Ventricular Rate 90    Atrial Rate 90    NV Interval 186    QRS Duration 78        QTc 440    P Axis     R AXIS -2    T Axis 141    Interpretation ECG      Normal sinus rhythm  Low voltage QRS  Nonspecific T wave abnormality  Abnormal ECG  No significant change as compared to previous ECG from 6/2/22.    ECG performed at 1334, interpreted by me at 1349.     Independent Interpretation   CT Head: No evidence of acute intracranial hemorrhage or mass effect.  EKG shows sinus rhythm, rate 90, no acute concerning ST or T wave changes, QTc 440  ED Course      Medications Administered   Medications   sodium chloride 0.9 % infusion (has no administration in time range)   labetalol (NORMODYNE/TRANDATE) injection 10 mg (has no administration in time range)     Or   hydrALAZINE (APRESOLINE) injection 10 mg (has no administration in time range)   niCARdipine 40 mg in 200 mL NS (CARDENE) infusion (has no administration in time range)   iopamidol (ISOVUE-370) solution 67 mL (67 mLs Intravenous $Given 2/6/25 1317)     And   sodium chloride 0.9 % bag for CT scan flush use (100 mLs As instructed $Given 2/6/25 1317)   tenecteplase (TNKase) injection 18.5 mg (18.5 mg Intravenous $Given 2/6/25 1843)       Procedures    Procedures     Discussion of Management   Stroke Neurology, Dr. Agee  Hospitalist, Dr. Farias    ED Course   ED Course as of 02/06/25 1523   u Feb 06, 2025   1312 I obtained the history and examined the patient as noted above.      1317 I spoke with Dr. Agee from stroke neurology regarding the patient.       Additional Documentation  None    Medical Decision Making / Diagnosis     CMS Diagnoses: The patient has stroke symptoms:         ED Stroke specific documentation           NIHSS PDF     Patient last known well time: 0915  ED Provider first to bedside at: 1311  CT Results received at: 1341    Thrombolytics:   Risks (including potential for bleeding and death), benefits, and alternatives to thrombolytic therapy were discussed with Patient and Family. Tenecteplase (TNK) administered without delay.    If treating with thrombolytics: Ensure SBP<180 and DBP<105 prior to treatment with thrombolytics.  Administering thrombolytics after treatment with IV labetalol, hydralazine, or nicardipine is reasonable once BP control is established.    Endovascular Retrieval:  Not initiated due to absence of proximal vessel occlusion    [employer] Stroke Scale (Baseline)  Time Performed: 1312     Score    Level of consciousness: (0)   Alert, keenly responsive    LOC questions: (0)   Answers both questions correctly    LOC commands: (0)   Performs both tasks correctly    Best gaze: (0)   Normal    Visual: (0)   No visual loss    Facial palsy: (0)   Normal symmetrical movements    Motor arm (left): (0)   No drift    Motor arm (right): (0)   No drift    Motor leg (left): (1)   Drift    Motor leg (right): (0)   No drift    Limb ataxia: (0)   Absent    Sensory: (1)   Mild to moderate sensory loss    Best language: (0)   Normal- no aphasia    Dysarthria: (0)   Normal    Extinction and inattention: (0)   No abnormality        Total Score:  2          MIPS       None    [facility]   Shanna Sutton is a 59 year old  female who presents with the concerns as detailed above.  On arrival to the room she was approximately 4 hours into the symptoms however it sounds like initially was generalized fatigue in the paresthesias and left leg weakness started closer to 2 and half hours ago.  Tier 1 stroke was called.  CT CTA were unremarkable.  Stroke neurology evaluate the patient and TNK was administered.  Patient has remained normotensive.  She is not having a headache or chest pain at this time.  Basic labs as above essentially unremarkable.  EKG is nondiagnostic.  On reevaluation she is feeling slightly better.  The paresthesias seem to be mainly in the left side of her face now.  She continues to deny headache and has been no neurologic symptoms noted.  Given that she does not need vasoactive medications to keep her blood pressure and check show he brought to the [facility].  I discussed the case with hospital service who agreed.  Family was updated the bedside and also agree.    Critical care time exclusive of procedures was 35 minutes    Disposition   The patient was admitted to the hospital.     Diagnosis     ICD-10-CM    1. Acute CVA (cerebrovascular accident) (H)  I63.9       2. Paresthesia of left upper and lower extremity  R20.2       3. Left leg weakness  R29.898            Discharge Medications   New Prescriptions    No medications on file         Scribe Disclosure:  Winnie GORDILLO, am serving as a scribe at 1:23 PM on 2/6/2025 to document services personally performed by Perfecto Stevens MD based on my observations and the provider's statements to me.        Perfecto Stevens MD  02/06/25 [id number]

## 2025-02-06 NOTE — PLAN OF CARE
Reason for Admission: New left sided tingling/numbness --> TNK received at 1403 today    Cognitive/Mentation: A/Ox 4  Neuros/CMS: Intact ex slight decreased sensation to left side, moreso in leg and face. Slight left-sided weakness to LLE; improving per pt. No notable edema.  VS: VSS.   Tele: NSR.  /GI: Continent. Voided upon arrival to unit floor.   Pulmonary: LS clear. Denies SOB  Pain: Denies.     Drains/Lines: Two PIV in LUE. Saline locked  Skin: WNL  Activity: SBA with GB.  Diet: Passed dysphagia screen; regular with thin liquids. Takes pills whole with water per pt; no oral medications given this shift.     Therapies recs: TBD  Discharge: Pending workup    Aggression Stoplight Tool: Green    End of shift summary: MRI completed this shift; results appear negative for stroke; neuro to round on patient tomorrow.  Hourly neuro checks concluding at shift end and will transition to q2h x 8 hours.

## 2025-02-06 NOTE — PROGRESS NOTES
RECEIVING UNIT ED HANDOFF REVIEW    ED [id number]-Report was given to: Elysia Painting RN on February 6, 2025 at 3:35 PM

## 2025-02-06 NOTE — ED TRIAGE NOTES
Pt got lightheaded and dizzy at work and so went home   Pt states it started at 915 today    Pt then started to having left sided tingling and palpitations

## 2025-02-07 ENCOUNTER — ORDERS ONLY (AUTO-RELEASED) (OUTPATIENT)
Dept: MEDSURG UNIT | Facility: CLINIC | Age: 60
End: 2025-02-07

## 2025-02-07 ENCOUNTER — APPOINTMENT (OUTPATIENT)
Dept: CARDIOLOGY | Facility: CLINIC | Age: 60
End: 2025-02-07
Attending: STUDENT IN AN ORGANIZED HEALTH CARE EDUCATION/TRAINING PROGRAM
Payer: COMMERCIAL

## 2025-02-07 ENCOUNTER — APPOINTMENT (OUTPATIENT)
Dept: CT IMAGING | Facility: CLINIC | Age: 60
End: 2025-02-07
Attending: PSYCHIATRY & NEUROLOGY
Payer: COMMERCIAL

## 2025-02-07 ENCOUNTER — APPOINTMENT (OUTPATIENT)
Dept: PHYSICAL THERAPY | Facility: CLINIC | Age: 60
End: 2025-02-07
Attending: STUDENT IN AN ORGANIZED HEALTH CARE EDUCATION/TRAINING PROGRAM
Payer: COMMERCIAL

## 2025-02-07 VITALS
HEART RATE: 73 BPM | BODY MASS INDEX: 24.66 KG/M2 | OXYGEN SATURATION: 96 % | SYSTOLIC BLOOD PRESSURE: 98 MMHG | RESPIRATION RATE: 16 BRPM | DIASTOLIC BLOOD PRESSURE: 70 MMHG | TEMPERATURE: 97.7 F | WEIGHT: 162.7 LBS | HEIGHT: 68 IN

## 2025-02-07 VITALS
RESPIRATION RATE: 16 BRPM | SYSTOLIC BLOOD PRESSURE: 119 MMHG | TEMPERATURE: 97.8 F | HEIGHT: 68 IN | HEART RATE: 81 BPM | BODY MASS INDEX: 24.32 KG/M2 | DIASTOLIC BLOOD PRESSURE: 80 MMHG | OXYGEN SATURATION: 97 % | WEIGHT: 160.5 LBS

## 2025-02-07 DIAGNOSIS — R20.2 PARESTHESIA OF LEFT UPPER AND LOWER EXTREMITY: ICD-10-CM

## 2025-02-07 DIAGNOSIS — R29.898 LEFT LEG WEAKNESS: ICD-10-CM

## 2025-02-07 LAB
ANION GAP SERPL CALCULATED.3IONS-SCNC: 11 MMOL/L (ref 7–15)
BUN SERPL-MCNC: 11.6 MG/DL (ref 6–20)
CALCIUM SERPL-MCNC: 10.1 MG/DL (ref 8.8–10.4)
CHLORIDE SERPL-SCNC: 100 MMOL/L (ref 98–107)
CHOLEST SERPL-MCNC: 240 MG/DL
CREAT SERPL-MCNC: 0.88 MG/DL (ref 0.51–0.95)
EGFRCR SERPLBLD CKD-EPI 2021: 76 ML/MIN/1.73M2
ERYTHROCYTE [DISTWIDTH] IN BLOOD BY AUTOMATED COUNT: 12 % (ref 10–15)
GLUCOSE BLDC GLUCOMTR-MCNC: 109 MG/DL (ref 70–99)
GLUCOSE BLDC GLUCOMTR-MCNC: 121 MG/DL (ref 70–99)
GLUCOSE BLDC GLUCOMTR-MCNC: 87 MG/DL (ref 70–99)
GLUCOSE SERPL-MCNC: 144 MG/DL (ref 70–99)
HCO3 SERPL-SCNC: 27 MMOL/L (ref 22–29)
HCT VFR BLD AUTO: 44.1 % (ref 35–47)
HDLC SERPL-MCNC: 61 MG/DL
HGB BLD-MCNC: 15.2 G/DL (ref 11.7–15.7)
LDLC SERPL CALC-MCNC: 142 MG/DL
LVEF ECHO: NORMAL
MAGNESIUM SERPL-MCNC: 2.1 MG/DL (ref 1.7–2.3)
MCH RBC QN AUTO: 30.3 PG (ref 26.5–33)
MCHC RBC AUTO-ENTMCNC: 34.5 G/DL (ref 31.5–36.5)
MCV RBC AUTO: 88 FL (ref 78–100)
NONHDLC SERPL-MCNC: 179 MG/DL
PLATELET # BLD AUTO: 285 10E3/UL (ref 150–450)
POTASSIUM SERPL-SCNC: 4 MMOL/L (ref 3.4–5.3)
RBC # BLD AUTO: 5.02 10E6/UL (ref 3.8–5.2)
SODIUM SERPL-SCNC: 138 MMOL/L (ref 135–145)
TRIGL SERPL-MCNC: 186 MG/DL
WBC # BLD AUTO: 8.1 10E3/UL (ref 4–11)

## 2025-02-07 PROCEDURE — 250N000013 HC RX MED GY IP 250 OP 250 PS 637: Performed by: STUDENT IN AN ORGANIZED HEALTH CARE EDUCATION/TRAINING PROGRAM

## 2025-02-07 PROCEDURE — G0378 HOSPITAL OBSERVATION PER HR: HCPCS

## 2025-02-07 PROCEDURE — 999N000111 HC STATISTIC OT IP EVAL DEFER

## 2025-02-07 PROCEDURE — 97161 PT EVAL LOW COMPLEX 20 MIN: CPT | Mod: GP | Performed by: PHYSICAL THERAPIST

## 2025-02-07 PROCEDURE — 36415 COLL VENOUS BLD VENIPUNCTURE: CPT | Performed by: STUDENT IN AN ORGANIZED HEALTH CARE EDUCATION/TRAINING PROGRAM

## 2025-02-07 PROCEDURE — 99239 HOSP IP/OBS DSCHRG MGMT >30: CPT | Performed by: INTERNAL MEDICINE

## 2025-02-07 PROCEDURE — 999N000226 HC STATISTIC SLP IP EVAL DEFER: Performed by: SPEECH-LANGUAGE PATHOLOGIST

## 2025-02-07 PROCEDURE — 85049 AUTOMATED PLATELET COUNT: CPT | Performed by: STUDENT IN AN ORGANIZED HEALTH CARE EDUCATION/TRAINING PROGRAM

## 2025-02-07 PROCEDURE — 82435 ASSAY OF BLOOD CHLORIDE: CPT | Performed by: STUDENT IN AN ORGANIZED HEALTH CARE EDUCATION/TRAINING PROGRAM

## 2025-02-07 PROCEDURE — 93306 TTE W/DOPPLER COMPLETE: CPT | Mod: 26 | Performed by: INTERNAL MEDICINE

## 2025-02-07 PROCEDURE — 70450 CT HEAD/BRAIN W/O DYE: CPT

## 2025-02-07 PROCEDURE — 255N000002 HC RX 255 OP 636: Performed by: STUDENT IN AN ORGANIZED HEALTH CARE EDUCATION/TRAINING PROGRAM

## 2025-02-07 PROCEDURE — 99232 SBSQ HOSP IP/OBS MODERATE 35: CPT | Mod: GC | Performed by: PSYCHIATRY & NEUROLOGY

## 2025-02-07 PROCEDURE — 85027 COMPLETE CBC AUTOMATED: CPT | Performed by: STUDENT IN AN ORGANIZED HEALTH CARE EDUCATION/TRAINING PROGRAM

## 2025-02-07 PROCEDURE — 82374 ASSAY BLOOD CARBON DIOXIDE: CPT | Performed by: STUDENT IN AN ORGANIZED HEALTH CARE EDUCATION/TRAINING PROGRAM

## 2025-02-07 PROCEDURE — 82465 ASSAY BLD/SERUM CHOLESTEROL: CPT | Performed by: STUDENT IN AN ORGANIZED HEALTH CARE EDUCATION/TRAINING PROGRAM

## 2025-02-07 PROCEDURE — 83735 ASSAY OF MAGNESIUM: CPT | Performed by: INTERNAL MEDICINE

## 2025-02-07 PROCEDURE — 80048 BASIC METABOLIC PNL TOTAL CA: CPT | Performed by: STUDENT IN AN ORGANIZED HEALTH CARE EDUCATION/TRAINING PROGRAM

## 2025-02-07 PROCEDURE — 999N000208 ECHOCARDIOGRAM COMPLETE

## 2025-02-07 PROCEDURE — 250N000013 HC RX MED GY IP 250 OP 250 PS 637: Performed by: INTERNAL MEDICINE

## 2025-02-07 RX ORDER — ASPIRIN 81 MG/1
81 TABLET ORAL DAILY
Qty: 100 TABLET | Refills: 1 | Status: SHIPPED | OUTPATIENT
Start: 2025-02-07

## 2025-02-07 RX ORDER — ASPIRIN 81 MG/1
81 TABLET ORAL DAILY
Status: DISCONTINUED | OUTPATIENT
Start: 2025-02-07 | End: 2025-02-07 | Stop reason: HOSPADM

## 2025-02-07 RX ADMIN — PANTOPRAZOLE SODIUM 40 MG: 40 TABLET, DELAYED RELEASE ORAL at 06:43

## 2025-02-07 RX ADMIN — ASPIRIN 81 MG: 81 TABLET, COATED ORAL at 17:49

## 2025-02-07 RX ADMIN — ATORVASTATIN CALCIUM 20 MG: 20 TABLET, FILM COATED ORAL at 09:21

## 2025-02-07 RX ADMIN — PERFLUTREN 10 ML: 6.52 INJECTION, SUSPENSION INTRAVENOUS at 12:23

## 2025-02-07 ASSESSMENT — ACTIVITIES OF DAILY LIVING (ADL)
ADLS_ACUITY_SCORE: 16
ADLS_ACUITY_SCORE: 20
ADLS_ACUITY_SCORE: 16
DEPENDENT_IADLS:: INDEPENDENT
ADLS_ACUITY_SCORE: 16
ADLS_ACUITY_SCORE: 16
ADLS_ACUITY_SCORE: 20
ADLS_ACUITY_SCORE: 16
ADLS_ACUITY_SCORE: 16
ADLS_ACUITY_SCORE: 20
ADLS_ACUITY_SCORE: 16
ADLS_ACUITY_SCORE: 20
ADLS_ACUITY_SCORE: 16
ADLS_ACUITY_SCORE: 20
ADLS_ACUITY_SCORE: 16

## 2025-02-07 NOTE — DISCHARGE SUMMARY
"[facility]  Hospitalist Discharge Summary      Date of Admission:  2/6/2025  Date of Discharge:  2/7/2025   Discharging Provider: MALIK PANDA MD  Discharge Service: Hospitalist Service    Discharge Diagnoses   Left-sided numbness/weakness likely ischemic stroke aborted by TNK versus MRI negative ischemic stroke  Continue on aspirin 81 mg daily  Follow-up stroke neurology in 6 to 8 weeks  Zio patch on discharge   Hyperlipidemia on lipitor     Clinically Significant Risk Factors          Follow-ups Needed After Discharge   Follow-up Appointments       Hospital Follow-up with Existing Primary Care Provider (PCP)      Please see details below         Schedule Primary Care visit within: 7 Days           Follow up hospital stay     Unresulted Labs Ordered in the Past 30 Days of this Admission       No orders found for last 31 day(s).        These results will be followed up by primary     Discharge Disposition   Discharged to home  Condition at discharge: Stable    Hospital Course   Shanna Sutton is a 59 year old female with a history of mixed hyperlipidemia, constipation admitted on 2/6/2025 after presenting to the emergency room with L sided weakness and numbness.     Patient presented to the emergency room for evaluation of numbness tingling and left-sided weakness.  She began experiencing symptoms at 9:15 AM on the morning of admission.  She initially ignored her symptoms and even went to the grocery store.  She did lay down to rest and her watch alerted her that her heart rate was up to 140's which she did not notice.  Felt like she did not \"feel right\" no headaches.  Her  was taking her to urgent care but and route she had concerning facial numbness and left-sided numbness and brought to the ER.  On admission blood pressure 137/87.  CT head negative.  CTA head and neck negative for large vessel occlusion.  Patient was given TNK     L sided weakness and L sided numbness " "  Ischemic stroke aborted by TNK versus MRI negative ischemic stroke  Status post TNK  Developed left arm and leg weakness around 9:15 am. History as above.  And route she developed left-sided numbness and full  En route she developed L facial numbness and weakness. Presented to the ER ~1pm as code stroke called. .  CT head negative . CTA head and neck negative for large vessel occlusion or CVA. Neurology evaluated and recommended TNK which was pushed at 1403.  Patient was admitted to the hospital status post TNK pathway.  Permissive hypertension.  Ischemic stroke lytic set.  Goal for blood pressure less than 180/105. 2/6 MRI with and without contrast showing no acute intracranial process.  Mild mucosal thickening involving ethmoid septa and right maxillary sinus.  Patient on Lipitor 20 mg.  Negative troponin of 6.  Glycosylated hemoglobin 5.6.  Echo with normal EF.  60 to 65%.  Right ventricular structure normal function size.  Bubble study negative.  No pericardial effusion.  EKG with no acute changes from prior with normal sinus rhythm.   Patient was seen by stroke neurology in follow-up.  Aspirin 81 mg for secondary stroke prevention.  Continue prior to admission statin.  Discussion regarding hospital stay with stroke neurology with differential of negative MRI stroke or stroke aborted by IVT.  Heart monitor on discharge.  If heart monitor negative consider outpatient workup.  Patient to follow-up with stroke neurology in 6 to 8 weeks.  Zio patch on discharge.       Elevated HR  Patient's iwatch alerted her while she was resting during her stroke symptoms that her heart rate was 110-140s. She denies feeling any palpitation but felt \"not right\". NSR this admit on monitor. EKG with sinus.  Zio patch on discharge.  Patient also has an Apple Watch and can continue to monitor her heart rate.       HLD  statin therapy as above      Consultations This Hospital Stay   NEUROLOGY IP STROKE CONSULT  SPEECH LANGUAGE PATH " ADULT IP CONSULT  PHARMACY IP CONSULT  PHARMACY IP CONSULT  PHARMACY IP CONSULT  PHYSICAL THERAPY ADULT IP CONSULT  OCCUPATIONAL THERAPY ADULT IP CONSULT  REHAB ADMISSIONS LIAISON IP CONSULT  CARE MANAGEMENT / SOCIAL WORK IP CONSULT  CARDIOLOGY IP CONSULT  SMOKING CESSATION PROGRAM IP CONSULT    Code Status   Full Code    Time Spent on this Encounter   I, MALIK PANDA MD, personally saw the patient today and spent greater than 30 minutes discharging this patient.       MALIK PANDA MD  [facility] NEUROSCIENCE UNIT  [address] [place] [address]  Phone: [phone]  ______________________________________________________________________    Physical Exam   Vital Signs: Temp: 98.1  F (36.7  C) Temp src: Oral BP: 113/83 Pulse: 80   Resp: 16 SpO2: 97 % O2 Device: None (Room air)    Weight: 160 lbs 7.92 oz  General Appearance: Patient was awake and alert in no distress  Respiratory: Lungs clear to auscultation bilaterally without wheezes or rhonchi  Cardiovascular: Regular rate and rhythm without gallop rub normal S1-S2  GI: Positive bowel sounds soft rebound guarding or tenderness  Skin: No edema         Primary Care Physician   Lexi Forrester    Discharge Orders      Primary Care - Care Coordination Referral      Reason for your hospital stay    Left sided numbness and weakness with concerns of stroke     Activity    Your activity upon discharge: activity as tolerated     Diet    Follow this diet upon discharge: Current Diet:Orders Placed This Encounter      Regular Diet Adult     Stroke Hospital Follow Up (for neurologist use only)    [facility] will call you to coordinate care as prescribed by your provider. If you don t hear from a representative within 2 business days, please call [phone].       Hospital Follow-up with Existing Primary Care Provider (PCP)    Please see details below          ZIO PATCH MAIL OUT       Significant Results and Procedures   Most  Recent 3 CBC's:  Recent Labs   Lab Test 02/07/25  0901 02/06/25  1314 01/24/24  0830   WBC 8.1 10.1 6.6   HGB 15.2 15.3 14.9   MCV 88 88 86    313 278     Most Recent 3 BMP's:  Recent Labs   Lab Test 02/07/25  1146 02/07/25  0901 02/07/25  0747 02/06/25  1328 02/06/25  1314 01/24/24  0830   NA  --  138  --   --  140 141   POTASSIUM  --  4.0  --   --  4.3 4.2   CHLORIDE  --  100  --   --  101 105   CO2  --  27  --   --  27 24   BUN  --  11.6  --   --  14.4 17.2   CR  --  0.88  --   --  0.76 0.80   ANIONGAP  --  11  --   --  12 12   ALICIA  --  10.1  --   --  10.3 10.0   GLC 87 144* 121*   < > 92 104*    < > = values in this interval not displayed.     Most Recent 2 LFT's:  Recent Labs   Lab Test 08/13/24  1016 01/24/24  0830   AST 34 53*   ALT 34 66*   ALKPHOS 112 133   BILITOTAL 0.5 0.4     Most Recent 3 INR's:  Recent Labs   Lab Test 02/06/25  1355   INR 0.94     Most Recent INR's and Anticoagulation Dosing History:  Anticoagulation Dose History          Latest Ref Rng & Units 2/6/2025   Recent Dosing and Labs   INR 0.85 - 1.15 0.94      Most Recent 3 Creatinines:  Recent Labs   Lab Test 02/07/25  0901 02/06/25  1314 01/24/24  0830   CR 0.88 0.76 0.80     Most Recent 3 Hemoglobins:  Recent Labs   Lab Test 02/07/25  0901 02/06/25  1314 01/24/24  0830   HGB 15.2 15.3 14.9     Most Recent 3 Troponin's:No lab results found.  Most Recent 3 BNP's:No lab results found.  Most Recent D-dimer:No lab results found.  Most Recent Cholesterol Panel:  Recent Labs   Lab Test 02/07/25  0901   CHOL 240*   *   HDL 61   TRIG 186*     7-Day Micro Results       No results found for the last 168 hours.          Most Recent TSH and T4:  Recent Labs   Lab Test 01/24/24  0830   TSH 2.53     Most Recent Hemoglobin A1c:  Recent Labs   Lab Test 02/06/25  1314   A1C 5.6     Most Recent 6 glucoses:  Recent Labs   Lab Test 02/07/25  1146 02/07/25  0901 02/07/25  0747 02/07/25  0011 02/06/25  1328 02/06/25  1314   GLC 87 144* 121* 109*  77 92     Most Recent Urinalysis:No lab results found.  Most Recent ABG:No lab results found.  Most Recent ESR & CRP:No lab results found.  Most Recent Anemia Panel:  Recent Labs   Lab Test 02/07/25  0901   WBC 8.1   HGB 15.2   HCT 44.1   MCV 88        Most Recent CPK:No lab results found.,   Results for orders placed or performed during the hospital encounter of 02/06/25   CT Head w/o Contrast    Narrative    EXAM: CT HEAD W/O CONTRAST  LOCATION: [facility]  DATE: 2/6/2025    INDICATION: Code Stroke to evaluate for potential thrombolysis and thrombectomy. PLEASE READ IMMEDIATELY.  COMPARISON: None.  TECHNIQUE: Routine CT Head without IV contrast. Multiplanar reformats. Dose reduction techniques were used.    FINDINGS:  INTRACRANIAL CONTENTS: No evidence of acute intracranial hemorrhage or mass effect. Brain attenuation and morphology are normal. The ventricles and sulci are normal for age. Normal gray-white matter differentiation. The basilar cisterns are patent.    VISUALIZED ORBITS/SINUSES/MASTOIDS: The globes are unremarkable. The partially imaged paranasal sinuses, mastoid air cells and middle ear cavities are unremarkable.     BONES/SOFT TISSUES: The visualized skull base and calvarium are unremarkable.      Impression    IMPRESSION:    1.  No evidence of acute intracranial hemorrhage or mass effect.   CTA Head Neck with Contrast    Narrative    EXAM: CTA HEAD NECK W CONTRAST  LOCATION: [facility]  DATE: 2/6/2025    INDICATION: Code Stroke to evaluate for potential thrombolysis and thrombectomy. PLEASE READ IMMEDIATELY.  COMPARISON: None.  CONTRAST: 67mL Isovue 370  TECHNIQUE: Head and neck CT angiogram with IV contrast. Noncontrast head CT followed by axial helical CT images of the head and neck vessels obtained during the arterial phase of intravenous contrast administration. Axial 2D reconstructed images and   multiplanar 3D MIP reconstructed images  of the head and neck vessels were performed by the technologist. Dose reduction techniques were used. All stenosis measurements made according to NASCET criteria unless otherwise specified.    FINDINGS:   HEAD CTA:  Examination of the anterior circulation demonstrates flow within the bilateral internal carotid and proximal aspects of the anterior middle cerebral arteries. The anterior communicating artery is demonstrated.    Examination of the posterior circulation demonstrates flow within the distal vertebral, basilar, and proximal aspects of the posterior cerebral arteries. The right and left posterior communicating arteries are not demonstrated with certainty.    No evidence of pathologic vascular occlusion or saccular aneurysm within the visualized intracranial circulation by CT angiography.    NECK CTA:  The aortic arch has normal branching configuration. There is flow within the brachiocephalic, common carotid, proximal aspects of the subclavian arteries.    The right common carotid artery is patent. Examination of the right carotid bulb demonstrates no evidence of hemodynamically American stenosis within the right internal carotid artery within the neck.    The left common carotid artery is patent. Examination of the left carotid bulb demonstrates no evidence of hemodynamically significant stenosis within the left internal carotid artery within the neck.    The right and left vertebral arteries are patent.    No evidence of arterial dissection.    The parotid, submandibular and thyroid glands are unremarkable.    No evidence of cervical lymphadenopathy by size or morphologic criteria.    The partially imaged lung apices are unremarkable. No suspicious osseous lesions.      Impression    IMPRESSION:   HEAD CTA:   1.  No evidence of pathologic vascular occlusion or saccular aneurysm within the visualized intracranial circulation by CT angiography.    NECK CTA:  1.  No evidence of hemodynamically significant  stenosis within either internal carotid artery within the neck.    Dr. Baez discussed the results with Dr. Hernandez on 2/6/2025 1:41 PM CST by telephone.   MR Brain w/o & w Contrast    Narrative    EXAM: MR BRAIN W/O and W CONTRAST  LOCATION: [facility]  DATE: 2/6/2025    INDICATION: L sided numbness weakness s p TNK  COMPARISON: CTA 2/6/2025  CONTRAST: 7 mL Gadavist  TECHNIQUE: Routine multiplanar multisequence head MRI without and with intravenous contrast.    FINDINGS:  INTRACRANIAL CONTENTS: No acute or subacute infarct. No mass, acute hemorrhage, or extra-axial fluid collections. Age-appropriate brain parenchymal signal. Normal ventricles and sulci. Normal position of the cerebellar tonsils. No pathologic contrast   enhancement.    SELLA: No abnormality accounting for technique.    OSSEOUS STRUCTURES/SOFT TISSUES: Normal marrow signal. The major intracranial vascular flow voids are maintained.     ORBITS: No abnormality accounting for technique.     SINUSES/MASTOIDS: Mild mucosal thickening involving ethmoid septa and inferior right maxillary sinus. No middle ear or mastoid effusion.       Impression    IMPRESSION:  1.  No acute intracranial process.   CT Head w/o Contrast    Narrative    EXAM: CT HEAD W/O CONTRAST  LOCATION: [facility]  DATE: 2/7/2025    INDICATION: Post TNk check at 24 hrs. L sided weakness, numbness.  COMPARISON: Head MRI 2/6/2025  TECHNIQUE: Routine CT Head without IV contrast. Multiplanar reformats. Dose reduction techniques were used.    FINDINGS:  No evidence of hemorrhage. Background of nonspecific white matter hypoattenuation presumably representing chronic small vessel ischemic change. No acute osseous abnormality.      Impression    IMPRESSION: No evidence of hemorrhage.   Echocardiogram Complete with Bubble Study     Value    LVEF  60-65%    Narrative    [id number]  [id number]  [id number]  [id number]^HAILY^LARA^E     [place]  [facility]  Echocardiography Laboratory  [address] [place] [address]     Name: JACK VICK  MRN: [id number]  : 1965  Study Date: 2025 10:37 AM  Age: 59 yrs  Gender: Female  Patient Location: [facility]  Reason For Study: CVA  Ordering Physician: LARA JOHANSEN  Performed By: Alfie Rodas     BSA: 1.9 m2  Height: 68 in  Weight: 160 lb  HR: 79  BP: 133/85 mmHg  ______________________________________________________________________________  Procedure  Bubble Echocardiogram with two-dimensional, color and spectral Doppler.  Definity (NDC #54029-677) given intravenously.  ______________________________________________________________________________  Interpretation Summary     Left ventricular systolic function is normal.  The visual ejection fraction is 60-65%.  The right ventricle is normal in structure, function and size.  Bubble study negative for interatrial shunting.  The aortic root is normal size.  The inferior vena cava was normal in size with preserved respiratory  variability.  There is no pericardial effusion.     No prior study for comparison.  ______________________________________________________________________________  Left Ventricle  The left ventricle is normal in structure, function and size. There is normal  left ventricular wall thickness. Left ventricular systolic function is normal.  The visual ejection fraction is 60-65%. Left ventricular diastolic function is  indeterminate. Normal left ventricular wall motion.     Right Ventricle  The right ventricle is normal in structure, function and size.     Atria  Normal left atrial size. Right atrial size is normal. There is no atrial shunt  seen.     Mitral Valve  The mitral valve is normal in structure and function.     Tricuspid Valve  The tricuspid valve is normal in structure and function. Right ventricular  systolic pressure could not be approximated due to inadequate tricuspid  regurgitation.     Aortic  Valve  The aortic valve is trileaflet with aortic valve sclerosis.     Pulmonic Valve  The pulmonic valve is normal in structure and function.     Vessels  The aortic root is normal size. Normal size ascending aorta. The inferior vena  cava was normal in size with preserved respiratory variability.     Pericardium  There is no pericardial effusion.     ______________________________________________________________________________  MMode/2D Measurements & Calculations  IVSd: 0.70 cm  LVIDd: 4.5 cm  LVIDs: 3.1 cm  LVPWd: 0.70 cm  FS: 30.7 %     LV mass(C)d: 95.7 grams  LV mass(C)dI: 51.5 grams/m2  Ao root diam: 2.8 cm  LA dimension: 3.3 cm  asc Aorta Diam: 3.2 cm  LA/Ao: 1.2  LVOT diam: 2.0 cm  LVOT area: 3.0 cm2  Ao root diam index Ht(cm/m): 1.6  Ao root diam index BSA (cm/m2): 1.5  Asc Ao diam index BSA (cm/m2): 1.7  Asc Ao diam index Ht(cm/m): 1.8  LA Volume (BP): 24.1 ml  LA Volume Index (BP): 13.0 ml/m2     RV Base: 2.4 cm  RWT: 0.31  TAPSE: 2.2 cm     Doppler Measurements & Calculations  MV E max gilberto: 65.5 cm/sec  MV A max gilberto: 79.7 cm/sec  MV E/A: 0.82  MV dec slope: 286.2 cm/sec2  MV dec time: 0.23 sec  Ao V2 max: 142.8 cm/sec  Ao max P.0 mmHg  Ao V2 mean: 104.5 cm/sec  Ao mean P.8 mmHg  Ao V2 VTI: 26.1 cm  VIJAYA(I,D): 3.1 cm2  VIJAYA(V,D): 2.8 cm2  LV V1 max P.1 mmHg  LV V1 max: 133.5 cm/sec  LV V1 VTI: 27.3 cm  SV(LVOT): 81.8 ml  SI(LVOT): 44.0 ml/m2  PA acc time: 0.11 sec  AV Gilberto Ratio (DI): 0.94  VIJAYA Index (cm2/m2): 1.7  E/E' av.7  Lateral E/e': 5.3  Medial E/e': 6.1     RV S Gilberto: 9.8 cm/sec     ______________________________________________________________________________  Report approved by: Irlanda Kay MD on 2025 12:36 PM             Discharge Medications   Current Discharge Medication List        START taking these medications    Details   aspirin 81 MG EC tablet Take 1 tablet (81 mg) by mouth daily.  Qty: 100 tablet, Refills: 1    Associated Diagnoses: Acute CVA (cerebrovascular  accident) (H)           CONTINUE these medications which have NOT CHANGED    Details   atorvastatin (LIPITOR) 20 MG tablet TAKE 1 TABLET BY MOUTH EVERY DAY  Qty: 90 tablet, Refills: 0    Comments: Due for an appointment in January 2025.  Associated Diagnoses: Mixed hyperlipidemia      multivitamin w/minerals (THERA-VIT-M) tablet Take 1 tablet by mouth daily      omeprazole (PRILOSEC) 40 MG DR capsule TAKE 1 CAPSULE BY MOUTH EVERY DAY  Qty: 90 capsule, Refills: 2    Associated Diagnoses: Gastroesophageal reflux disease, unspecified whether esophagitis present      Vitamin D3 (CHOLECALCIFEROL) 25 mcg (1000 units) tablet Take by mouth daily           Allergies   Allergies   Allergen Reactions    Amoxicillin Hives

## 2025-02-07 NOTE — DISCHARGE INSTRUCTIONS
Your risk factors for stroke or TIA (transient ischemic attack):     Your Risk Factors Your Results Goals   [] High blood pressure BP: 119/80 (02/07/25 1650) Less than 120/80   [x] Cholesterol          Total 2/7/2025: 240 mg/dL   Less than 150    Triglycerides   2/7/2025: 186 mg/dL Less than 150    LDL 2/7/2025: 142 mg/dL    Less than 70    HDL 2/7/2025: 61 mg/dL         Greater than 40 (men)  Greater than 50 (women)   [] Diabetes                A1C 2/6/2025: 5.6 % Less than 5.7   [] Atrial fibrillation Atrial fibrillation noted on cardiac monitoring Manage per physician orders   [] Smoking/tobacco use   Tobacco Use      Smoking status: Never      Smokeless tobacco: Never   Quit smoking and tobacco   [] Overweight Body mass index is 24.4 kg/m .  Less than 25     Other risk factors include: carotid (neck) artery disease, other heart diseases, prior stroke or TIA, poor diet, lack of exercise, and excessive alcohol consumption.     [x] Written stroke educational materials given to patient and significant other including:   - Learning about BE FAST: Stroke Warning Signs and Learning about Risk Factors for Stroke (Healthwise)   - Understanding Stroke: Key Resources After a Stroke (FOD #351317)       Know the warning signs and symptoms of stroke: BE FAST     B = Balance loss   E = Eyesight changes   F = Facial droop or numbness   A = Arm or leg weakness   S = Speech difficulty, slurred speech   T = Time to call 911 for help    normal

## 2025-02-07 NOTE — PROGRESS NOTES
02/07/25 0909   Appointment Info   Signing Clinician's Name / Credentials (PT) Marilyn Salaazr, RANCHO   Living Environment   People in Home spouse   Current Living Arrangements house   Home Accessibility stairs to enter home   Number of Stairs, Main Entrance 8   Stair Railings, Main Entrance railings safe and in good condition;railing on right side (ascending)   Transportation Anticipated family or friend will provide   Living Environment Comments Once up 8 stairs is on main level.   Self-Care   Usual Activity Tolerance excellent   Current Activity Tolerance excellent   Equipment Currently Used at Home none   Fall history within last six months no   Activity/Exercise/Self-Care Comment Works from home as does . Does go into office 1 day/month which is where she was when symptoms began. Uses stairs, usually without a rail. Drives. Active in community.   General Information   Onset of Illness/Injury or Date of Surgery 02/06/25   Referring Physician Siomara Roque E., DO   Patient/Family Therapy Goals Statement (PT) To d/c to home   Pertinent History of Current Problem (include personal factors and/or comorbidities that impact the POC) Shanna Sutton is a 59 year old female who presents with left upper and lower extremity numbness and weakness. Reports feeling some lightheadedness that began 0930 this morning. She went to work and felt fine, but felt unilateral numbness and tingling that began in her face at 1130. Last known well before focal neurological deficits was 11:30. This then progressed to involve her left upper extremity and left lower extremity, she had associated diminished sensation to these areas. She felt like her heart was racing at this time as well. Sought evaluation at urgent care, who subsequently sent to the Emergency Department for evaluation.  She is not on any antiplatelet or anticoagulation at home. Denies any recent surgeries     On admission, /87, labs with BS 77, CBC and CMP  "unremarkable, PTT/INR within normal limits, CT head was negative for any acute hemorrhage, CTA head and neck was negative for any large vessel occlusion or any hemodynamic stenosis, discussed treatment options including TNK versus dual antiplatelet therapy with patient, patient elected for TNK, TNK was administered at 1403.   General Observations Long sitting in bed; agreeable to participate. In supine, prior to getting up: /74, HR 85, O2 sats 96% on RA. In sit:  /78, HR 88. After walking in halls:  126/86, HR89   Cognition   Affect/Mental Status (Cognition) WNL   Orientation Status (Cognition) oriented x 4   Follows Commands (Cognition) WNL   Pain Assessment   Patient Currently in Pain No   Integumentary/Edema   Integumentary/Edema no deficits were identifed   Posture    Posture Not impaired   Range of Motion (ROM)   Range of Motion ROM is WNL   Strength (Manual Muscle Testing)   Strength (Manual Muscle Testing) strength is WNL   Bed Mobility   Comment, (Bed Mobility) I supine to sit   Transfers   Comment, (Transfers) I STS   Gait/Stairs (Locomotion)   Comment, (Gait/Stairs) Ambulated 300'+ with no AD with varying degrees of speed, head turns, reading signs on wall. No deficits noted in balance, weakness, gait deviation or ability to read/sight. Sidestepping, backwards steps, and forwared marching all I'lly with no deficits; fairly quick speed which pt. states is baseline. Up and down 4 stairs with no rail hold (reports she doesn't typically hold rails d/t germs); performed I'lly, reciprocally; no deficits. Left in recliner; comfortable. Nurse button within reach.   Balance   Balance Comments no deficits noted; no AD used   Sensory Examination   Sensory Perception Comments patient endorsing she still feels mild numbness throughout entire L side of face; no vision disturbances; much more mild than on onset. Unsure if she is still feeling on L limbs. States \"if anything slightly on L arm but less than " "face\".   Coordination   Coordination no deficits were identified   Muscle Tone   Muscle Tone no deficits were identified   Clinical Impression   Criteria for Skilled Therapeutic Intervention Evaluation only   PT Diagnosis (PT) post stroke-like symptoms; received TNK   Influenced by the following impairments endorses mild L facial numbness; entire face; very mild compared to onset   Functional limitations due to impairments no functional impairment noted   Clinical Presentation (PT Evaluation Complexity) stable   Clinical Presentation Rationale clinical judgement   Clinical Decision Making (Complexity) low complexity   Risk & Benefits of therapy have been explained evaluation/treatment results reviewed;care plan/treatment goals reviewed;risks/benefits reviewed;current/potential barriers reviewed;participants voiced agreement with care plan;participants included   PT Total Evaluation Time   PT Eval, Low Complexity Minutes ([id number]) 20   Physical Therapy Goals   PT Frequency   (Evaluation only)   PT Predicted Duration/Target Date for Goal Attainment 02/07/25   PT Goals   (Evaluation only; no skilled PT needs; no goals written)   PT Discharge Planning   PT Plan Discontinue further PT; no skilled PT needs indicated. Evaluation only.   PT Discharge Recommendation (DC Rec) home   PT Rationale for DC Rec Patient feels she is basically at baseline for functional mobility and strength. Still notices mild numbness on L face and maybe slightly L arm but unsure. No balance or gait deficits noted including with high level drills and stairs without use of rail. Hand off to nursing to further determine if patient is medically OK to be cleared to be up in room without assist.   PT Brief overview of current status I functionally with gait without AD; no balance deficits noted including with high level gait drills for assessment or on stairs without use of rail. Goals of therapy will be to address safe mobility and make recs for d/c to " next level of care. Pt and RN will continue to follow all falls risk precautions as documented by RN staff while hospitalized   PT Total Distance Amb During Session (feet) 300   Physical Therapy Time and Intention   Total Session Time (sum of timed and untimed services) 20

## 2025-02-07 NOTE — PROGRESS NOTES
[facility]    Vascular Neurology Progress Note    Interval History     Shanna was seen and examined this AM, MRI brain did not reveal an acute stroke, there is interval resolution of left sided weakness but continues to have sensory deficits on her left.     Hospital Course     Chief complaint: Palpitations and Generalized Weakness    Shanna Sutton is a 59 year old female who presents with acute onset left upper and lower extremity numbness and weakness. On admission, /87, labs with BS 77, CBC and CMP unremarkable, PTT/INR within normal limits, CT head was negative for any acute hemorrhage, CTA head and neck was negative for any large vessel occlusion or any hemodynamic stenosis, discussed treatment options including TNK versus dual antiplatelet therapy with patient, patient elected for TNK, TNK was administered at 1403.  MRI brain with and without contrast was negative for any acute ischemic injury.    Assessment and Plan     1. L sided numbness/weakness likely ischemic stroke aborted by TNK vs MRI negative ischemic stroke     59 year old female who presents with acute onset left upper and lower extremity numbness and weakness. On admission, /87, labs with BS 77, CBC and CMP unremarkable, PTT/INR within normal limits, CT head was negative for any acute hemorrhage, CTA head and neck was negative for any large vessel occlusion or any hemodynamic stenosis, discussed treatment options including TNK versus dual antiplatelet therapy with patient, patient elected for TNK, TNK was administered at 1403.  TTE was negative for any cardioembolic source. MRI brain with and without contrast was negative for any acute ischemic injury. Today, there is interval resolution of left sided weakness but continues to have sensory deficits on her left. Suspect this is a case of aborted right hemispheric ischemic stroke by timely administration of TNK vs an MRI negative ischemic stroke of  "similar distribution.  Given presentation with palpitations in the setting of focal neurological deficits would raise a suspicion of a cardioembolic source, she might benefit from external cardiac monitoring for any evidence of occult A-fib. Will plan to start ASA 81 mg every day as repeat CT head is unrevealing for any bleed.  Consider hypercoagulability workup as outpatient if cardioembolic workup is unrevealing.      Plan:  - long term BP goal <130/80   - Statin: Continue home statin  - ASA 81 mg daily  - Telemetry, EKG  - Bedside Glucose Monitoring  - Lipid Panel  - PT/OT/SLP  - Stroke Education  - Euthermia, Euglycemia        Patient Follow-up    - in 6-8 weeks with general neurology or stroke AMY ([phone])    No further stroke evaluation is recommended, so we will sign off. Please contact us with any additional questions.  Zio patch on DC    The Stroke Staff is Dr. Aragon.    Doug Agee MD  Vascular Neurology Fellow    To page me or covering stroke neurology team member, click here: [facility]  Choose \"On Call\" tab at top, then select \"NEUROLOGY/ALL SITES\" from middle drop-down box, press Enter, then look for \"stroke\" or \"telestroke\" for your site.    Physical Examination     Temp: 97.7  F (36.5  C) Temp src: Oral BP: 97/68 Pulse: 64   Resp: 14 SpO2: 95 % O2 Device: None (Room air)      Neurologic  Mental Status:  alert, oriented x 3, follows commands, speech clear and fluent, naming and repetition normal  Cranial Nerves:  visual fields intact, PERRL, EOMI with normal smooth pursuit, face sensation decreased to light touch in left V1 V2 and V3, facial movements symmetric, hearing not formally tested but intact to conversation, palate elevation symmetric and uvula midline, no dysarthria, shoulder shrug strong bilaterally  Motor:  normal muscle tone and bulk, no abnormal movements, able to move all limbs spontaneously, strength 5/5 throughout upper and lower extremities, no pronator " drift  Reflexes:  toes down-going  Sensory: Decreased to light touch in left upper and lower extremities  Coordination:  normal finger-to-nose and heel-to-shin bilaterally without dysmetria  Station/Gait:  deferred    Stroke Scales       NIHSS  1a. Level of Consciousness 0-->Alert, keenly responsive   1b. LOC Questions 0-->Answers both questions correctly   1c. LOC Commands 0-->Performs both tasks correctly   2.   Best Gaze 0-->Normal   3.   Visual 0-->No visual loss   4.   Facial Palsy 0-->Normal symmetrical movements   5a. Motor Arm, Left 0-->No drift, limb holds 90 (or 45) degrees for full 10 secs   5b. Motor Arm, Right 0-->No drift, limb holds 90 (or 45) degrees for full 10 secs   6a. Motor Leg, Left 0   6b. Motor Leg, right 0-->No drift, leg holds 30 degree position for full 5 secs   7.   Limb Ataxia 0-->Absent   8.   Sensory 1-->Mild-to-moderate sensory loss, patient feels pinprick is less sharp or is dull on the affected side, or there is a loss of superficial pain with pinprick, but patient is aware of being touched   9.   Best Language 0-->No aphasia, normal   10. Dysarthria 0-->Normal   11. Extinction and Inattention  0-->No abnormality   Total 1 (2/7/25)       Imaging/Labs   (Bolded imaging and labs new and/or personally reviewed or re-reviewed by me today)    MRI/Head CT  CT head 1. No evidence of acute intracranial hemorrhage or mass effect    Intracranial Vasculature HEAD CTA:   1.  No evidence of pathologic vascular occlusion or saccular aneurysm within the visualized intracranial circulation by CT angiography.        Cervical Vasculature NECK CTA:  1.  No evidence of hemodynamically significant stenosis within either internal carotid artery within the neck.     Echocardiogram Left ventricular systolic function is normal.  The visual ejection fraction is 60-65%.  The right ventricle is normal in structure, function and size.  Bubble study negative for interatrial shunting.  The aortic root is normal  size.  The inferior vena cava was normal in size with preserved respiratory  variability.  There is no pericardial effusion.   EKG/Telemetry NSR      LDL  No lab value available in past 30 days   A1C  2/6/2025: 5.6 %   Troponin 2/6/2025: <6 ng/L     Other labs reviewed by me today:      Time Spent on this Encounter

## 2025-02-07 NOTE — PHARMACY-CONSULT NOTE
Pharmacy Consult to evaluate for medication related stroke core measures    Shanna Sutton, 59 year old female admitted for L sided weakness/numbness on 2/6/2025.    Thrombolytic was given on 2/6 at 1403.    VTE Prophylaxis SCDs /PCDs placed on 2/6, as appropriate prior to end of hospital day 2.    Antithrombotic: Held for 24 hours post-thrombolytic.     Anticoagulation if history of A-fib/flutter: Patient does not have history of A-fib/flutter - anticoagulation not required for medication related stroke core measures.     LDL Cholesterol Calculated   Date Value Ref Range Status   08/13/2024 117 (H) <=100 mg/dL Final   03/29/2019 213 (H) <100 mg/dL Final     Comment:     Above desirable:  100-129 mg/dl  Borderline High:  130-159 mg/dL  High:             160-189 mg/dL  Very high:       >189 mg/dl         Patient's home statin, Lipitor (atorvastatin) restarted; continue statin on discharge to meet quality measures, unless contraindicated.     Recommendations:  Repeat Head CT 24-hours post-thrombolytic read pending. If no bleed, consider ordering antithrombotic (eg: aspirin) by the end of today, hospital day 2, unless contraindicated, in order to meet stroke more measures    Thank you for the consult.    Iram Veliz RP 2/7/2025 2:36 PM

## 2025-02-07 NOTE — PLAN OF CARE
Physical Therapy Discharge Summary    Reason for therapy discharge:    Evaluation only; no skilled PT needs. Is at baseline with functional mobility.    Progress towards therapy goal(s). See goals on Care Plan in [facility] electronic health record for goal details.  See above    Therapy recommendation(s):    No further PT is indicated. Patient feels she is at baseline for functional mobility and strength. Still notices mild numbness on L face and maybe slightly L arm but unsure. No balance or gait deficits noted including with high level drills and stairs without use of rail. Hand off to nursing to further determine if patient is medically OK to be cleared to be up in room without assist.  No further skilled PT indicated. Screened for OT, with no findings that would indicate OT needed to see. Vision appears intact. No noticeable strength deficits in L U/E or L/E. Defer further PT. Evaluation only for PT.

## 2025-02-07 NOTE — CONSULTS
Care Management Initial Consult    General Information  Assessment completed with: Patient, VM-chart review,    Type of CM/SW Visit: Initial Assessment    Primary Care Provider verified and updated as needed: Yes   Readmission within the last 30 days:        Reason for Consult: other (see comments) (per Stroke protocol)  Advance Care Planning:            Communication Assessment  Patient's communication style: spoken language (English or Bilingual)    Hearing Difficulty or Deaf: no   Wear Glasses or Blind: no    Cognitive  Cognitive/Neuro/Behavioral: WDL  Level of Consciousness: alert  Arousal Level: opens eyes spontaneously  Orientation: oriented x 4  Mood/Behavior: calm, cooperative  Best Language: 0 - No aphasia  Speech: clear, spontaneous, logical    Living Environment:   People in home: spouse     Current living Arrangements: house      Able to return to prior arrangements: yes       Family/Social Support:  Care provided by: self, spouse/significant other  Provides care for: no one  Marital Status:   Support system:   Heath       Description of Support System: Supportive, Involved         Current Resources:   Patient receiving home care services: No        Community Resources: None  Equipment currently used at home: none  Supplies currently used at home: None    Employment/Financial:  Employment Status: employed part-time        Financial Concerns: none   Referral to Financial Worker: No       Does the patient's insurance plan have a 3 day qualifying hospital stay waiver?  No    Lifestyle & Psychosocial Needs:  Social Drivers of Health     Food Insecurity: Low Risk  (1/23/2024)    Food Insecurity     Within the past 12 months, did you worry that your food would run out before you got money to buy more?: No     Within the past 12 months, did the food you bought just not last and you didn t have money to get more?: No   Depression: Not at risk (1/24/2024)    PHQ-2     PHQ-2 Score: 2   Housing  Stability: Low Risk  (1/23/2024)    Housing Stability     Do you have housing? : Yes     Are you worried about losing your housing?: No   Tobacco Use: Low Risk  (9/11/2024)    Patient History     Smoking Tobacco Use: Never     Smokeless Tobacco Use: Never     Passive Exposure: Not on file   Financial Resource Strain: Low Risk  (1/23/2024)    Financial Resource Strain     Within the past 12 months, have you or your family members you live with been unable to get utilities (heat, electricity) when it was really needed?: No   Alcohol Use: Not on file   Transportation Needs: Low Risk  (1/23/2024)    Transportation Needs     Within the past 12 months, has lack of transportation kept you from medical appointments, getting your medicines, non-medical meetings or appointments, work, or from getting things that you need?: No   Physical Activity: Not on file   Interpersonal Safety: Low Risk  (9/11/2024)    Interpersonal Safety     Do you feel physically and emotionally safe where you currently live?: Yes     Within the past 12 months, have you been hit, slapped, kicked or otherwise physically hurt by someone?: No     Within the past 12 months, have you been humiliated or emotionally abused in other ways by your partner or ex-partner?: No   Stress: Not on file   Social Connections: Not on file   Health Literacy: Not on file       Functional Status:  Prior to admission patient needed assistance:   Dependent ADLs:: Independent  Dependent IADLs:: Independent       Mental Health Status:  Mental Health Status: No Current Concerns       Chemical Dependency Status:                Values/Beliefs:  Spiritual, Cultural Beliefs, Uatsdin Practices, Values that affect care: no               Discussed  Partnership in Safe Discharge Planning  document with patient/family: No    Additional Information:  No further care management intervention anticipated at this time.  Please re-consult if further needs arise.  Care management signing off.        Next Steps: PT currently recommending home at discharge. No needs seen at this time.      Og Mckay RN  [facility]  Inpatient Care Management - FLOAT  CM RN Mobile: [phone] daily 7:30-4:00

## 2025-02-07 NOTE — PROGRESS NOTES
SLP - Orders for speech/lang/swallow evaluation via stroke order set.    Chart reviewed - admit with L side weakness/numbness, L face numbness (described by RN and mild tingling, no weakness). No hx of dysphagia. Discussed with RNRoman. Pt passed bedside RN swallow screen. Tolerating regular diet/thin liquids/pills without difficulty. No speech/lang/cognitive changes. Brain MRI IMPRESSION:  1.  No acute intracranial process.    Formal IP SLP evaluation not indicated at this time. Will complete orders.     Please re-consult if changes or concerns arise.

## 2025-02-07 NOTE — PLAN OF CARE
OT: Order received, chart reviewed and discussed with care team. OT not indicated due to per discussion w/ evaluating physical therapist and chart review, pt is near functional baseline and no OT needs identified at this time. Defer discharge recommendations to medical team, expect pt will be safe to return home to prior living situation. Will complete orders.

## 2025-02-07 NOTE — PROVIDER NOTIFICATION
Patient reported ready for discharge by stroke team.   Need to confirm who will decide or order hyper coagulatable state  Does she start ASA 81 mg po daily today ??    Rama

## 2025-02-07 NOTE — PLAN OF CARE
Goal Outcome Evaluation:      Plan of Care Reviewed With: patient, spouse, child    Overall Patient Progress: improvingOverall Patient Progress: improving     Reason for Admission: New L sided weakness + numbness s/p TNK - resolved  Cognitive/Mentation: A/Ox 4  Neuros/CMS: Intact  VS: stable on RA. SBP <180   Tele: NSR.  GI/: Continent. Last BM PTA.  Pain: denies.   Drains/Lines: L PIV SL  Skin: Intact  Activity: SBA  Diet: Regular with thin liquids. Takes pills whole.   Discharge: pending workup  Aggression Stoplight Tool: green  End of shift summary: Mag and K+ protocol.

## 2025-02-10 ENCOUNTER — PATIENT OUTREACH (OUTPATIENT)
Dept: CARE COORDINATION | Facility: CLINIC | Age: 60
End: 2025-02-10
Payer: COMMERCIAL

## 2025-02-10 ENCOUNTER — TELEPHONE (OUTPATIENT)
Dept: INTERNAL MEDICINE | Facility: CLINIC | Age: 60
End: 2025-02-10
Payer: COMMERCIAL

## 2025-02-10 NOTE — TELEPHONE ENCOUNTER
Pt had lipid panel just 3 days ago and started on lipitor recently,  does not need lipids at this time

## 2025-02-10 NOTE — PROGRESS NOTES
"Clinic Care Coordination Contact  Transitions of Care Outreach  Chief Complaint   Patient presents with    Clinic Care Coordination - Post Hospital     Most Recent Admission Date: 2/6/2025   Most Recent Admission Diagnosis: Left leg weakness - R29.898  Acute CVA (cerebrovascular accident) (H) - I63.9  Paresthesia of left upper and lower extremity - R20.2     Most Recent Discharge Date: 2/7/2025   Most Recent Discharge Diagnosis: Acute CVA (cerebrovascular accident) (H) - I63.9  Paresthesia of left upper and lower extremity - R20.2  Left leg weakness - R29.898     Transitions of Care Assessment    Discharge Assessment  How are you doing now that you are home?: \"I'm tired, but taking my time\" per pt report  How are your symptoms? (Red Flag symptoms escalate to triage hotline per guidelines): Improved, Unchanged  Do you know how to contact your clinic care team if you have future questions or changes to your health status? : Yes  Does the patient have their discharge instructions? : Yes  Does the patient have questions regarding their discharge instructions? : No  Were you started on any new medications or were there changes to any of your previous medications? : Yes  Does the patient have all of their medications?: Yes  Do you have questions regarding any of your medications? : No  Do you have all of your needed medical supplies or equipment (DME)?  (i.e. oxygen tank, CPAP, cane, etc.): Yes  Post-op (Clinicians Only)  Did the patient have surgery or a procedure: No  Fever: No  Chills: No  Eating & Drinking: eating and drinking without complaints/concerns  Bowel Function: normal  Urinary Status: voiding without complaint/concerns    Follow up Plan     Discharge Follow-Up  Discharge follow up appointment scheduled in alignment with recommended follow up timeframe or Transitions of Risk Category? (Low = within 30 days; Moderate= within 14 days; High= within 7 days): Yes  Discharge Follow Up Appointment Date: " 02/12/25  Discharge Follow Up Appointment Scheduled with?: Primary Care Provider    Future Appointments   Date Time Provider Department Center   2/12/2025  3:30 PM Lexi Forrester MD [facility]       Outpatient Plan as outlined on AVS reviewed with patient.    For any urgent concerns, please contact our 24 hour nurse triage line: [phone] ([phone])       FREDY DempseySW

## 2025-02-10 NOTE — LETTER
[facility] CARE COORDINATION  [address]  [facility] [address]    February 10, 2025    Shanna Sutton  [id number]  [facility] [address]      Dear Shanna,    I am a clinic care coordinator who works with Lexi Forrester MD with the [facility]. I wanted to thank you for spending the time to talk with me.  Below is a description of clinic care coordination and how I can further assist you.       The clinic care coordination team is made up of a registered nurse, , financial resource worker and community health worker who understand the health care system. The goal of clinic care coordination is to help you manage your health and improve access to the health care system. Our team works alongside your provider to assist you in determining your health and social needs. We can help you obtain health care and community resources, providing you with necessary information and education. We can work with you through any barriers and develop a care plan that helps coordinate and strengthen the communication between you and your care team.  Our services are voluntary and are offered without charge to you personally.    Please feel free to contact me with any questions or concerns regarding care coordination and what we can offer.      We are focused on providing you with the highest-quality healthcare experience possible.    Sincerely,     CHEYENNE Roque  [facility]   Primary Care Social Work Care Coordinator  [facility] [place]   Phone: [phone]

## 2025-02-10 NOTE — TELEPHONE ENCOUNTER
Patient has virtual visit scheduled for 2/12/2025 to follow up after stroke.  Do you want her to do a lipid panel prior to that appointment?    Order pended, please sign if appropriate

## 2025-02-10 NOTE — TELEPHONE ENCOUNTER
General Call    Contacts       Contact Date/Time Type Contact Phone/Fax    02/10/2025 09:46 AM CST Phone (Incoming) LesleyShanna (Self) [phone] (M)          Reason for Call:  Pt has questions about upcoming video visit 2/12.    What are your questions or concerns:  Pt sustained a stroke on Thursday 2/06, following up on 2/12 virtually. Wants to know if PCP wants to order a lipid panel to be completed before that visit.     Date of last appointment with provider: 1/24/24    Could we send this information to you in [employer] or would you prefer to receive a phone call?:   Patient would like to be contacted via [employer]

## 2025-02-10 NOTE — TELEPHONE ENCOUNTER
Call to patient to notify her.    Thank you,  Parker, Triage RN Rhina Cabrera    3:33 PM 2/10/2025

## 2025-02-12 ENCOUNTER — VIRTUAL VISIT (OUTPATIENT)
Dept: INTERNAL MEDICINE | Facility: CLINIC | Age: 60
End: 2025-02-12
Attending: INTERNAL MEDICINE
Payer: COMMERCIAL

## 2025-02-12 DIAGNOSIS — I63.9 ACUTE CVA (CEREBROVASCULAR ACCIDENT) (H): ICD-10-CM

## 2025-02-12 DIAGNOSIS — Z09 HOSPITAL DISCHARGE FOLLOW-UP: Primary | ICD-10-CM

## 2025-02-12 DIAGNOSIS — E78.2 MIXED HYPERLIPIDEMIA: ICD-10-CM

## 2025-02-12 DIAGNOSIS — R20.2 PARESTHESIA OF LEFT UPPER AND LOWER EXTREMITY: ICD-10-CM

## 2025-02-12 PROCEDURE — 98006 SYNCH AUDIO-VIDEO EST MOD 30: CPT | Performed by: INTERNAL MEDICINE

## 2025-02-12 NOTE — PROGRESS NOTES
"Shanna is a 59 year old who is being evaluated via a billable video visit.    How would you like to obtain your AVS? MyChart  If the video visit is dropped, the invitation should be resent by: Text to cell phone: [phone]  Will anyone else be joining your video visit? No      Assessment & Plan     (Z09) Hospital discharge follow-up  (primary encounter diagnosis)  Plan:  likely ischemic stroke aborted by TNK versus MRI negative ischemic stroke , pt states lt facial numbness has almost resolved but still has some. Has appt with neurology. Continue aspirin/statin     (E78.2) Mixed hyperlipidemia  Plan: Patient states she was not taking her Lipitor regularly, was taking once or twice a week, reviewed last lipid panel, advised patient to take Lipitor daily as directed, follow healthy diet regular exercise and repeat lipids in 3 months.      (I63.9) Acute CVA (cerebrovascular accident) (H)  Plan: stable on aspirin/statin         MED REC REQUIRED  Post Medication Reconciliation Status:  Discharge medications reconciled, continue medications without change    The longitudinal plan of care for the diagnosis(es)/condition(s) as documented were addressed during this visit. Due to the added complexity in care, I will continue to support Shanna in the subsequent management and with ongoing continuity of care.      Subjective   Shanna is a 59 year old, presenting for the following health issues:  Hospital F/U      2/12/2025     1:58 PM   Additional Questions   Roomed by tawanda   Accompanied by self         2/12/2025     1:58 PM   Patient Reported Additional Medications   Patient reports taking the following new medications none     HPI         2/10/2025   Post Discharge Outreach   How are you doing now that you are home? \"I'm tired, but taking my time\" per pt report   How are your symptoms? (Red Flag symptoms escalate to triage hotline per guidelines) Improved;Unchanged   Does the patient have their discharge instructions?  " Yes   Does the patient have questions regarding their discharge instructions?  No   Were you started on any new medications or were there changes to any of your previous medications?  Yes   Does the patient have all of their medications? Yes   Do you have questions regarding any of your medications?  No   Do you have all of your needed medical supplies or equipment (DME)?  (i.e. oxygen tank, CPAP, cane, etc.) Yes   Discharge Follow Up Appointment Date 2/12/2025   Discharge Follow Up Appointment Scheduled with? Primary Care Provider       Hospital Follow-up Visit:    Hospital/Nursing Home/ Rehab Facility: [facility]  Date of Admission: 2/6/25  Date of Discharge: 2/7/25  Reason(s) for Admission: Left-sided numbness/weakness likely ischemic stroke aborted by TNK versus MRI negative ischemic stroke   Was the patient in the ICU or did the patient experience delirium during hospitalization?  No  Do you have any other stressors you would like to discuss with your provider? No    Problems taking medications regularly:  None  Medication changes since discharge: aspirin 81mg  Problems adhering to non-medication therapy:  None    Summary of hospitalization:  [facility] discharge summary reviewed  Diagnostic Tests/Treatments reviewed.  Follow up needed:   Other Healthcare Providers Involved in Patient s Care:         Specialist appointment - Neurologist   Update since discharge: improved.         Plan of care communicated with patient         Shanna Sutton is a 59 year old female with a history of mixed hyperlipidemia,  admitted on 2/6/2025 after presenting to the emergency room with L sided weakness and numbness.   L sided weakness and L sided numbness - Ischemic stroke aborted by TNK versus MRI negative ischemic stroke,Status post TNK  Developed left arm and leg weakness around 9:15 am.    And route she developed left-sided numbness and full  En route she developed L facial  numbness and weakness. Presented to the ER ~1pm as code stroke called. .  CT head negative . CTA head and neck negative for large vessel occlusion or CVA. Neurology evaluated and recommended TNK which was pushed at 1403.  Patient was admitted to the hospital status post TNK pathway.  Permissive hypertension.  Ischemic stroke lytic set.  Goal for blood pressure less than 180/105. 2/6 MRI with and without contrast showing no acute intracranial process.  Mild mucosal thickening involving ethmoid septa and right maxillary sinus.  Patient on Lipitor 20 mg.  Negative troponin of 6.  Glycosylated hemoglobin 5.6.  Echo with normal EF.  60 to 65%.  Right ventricular structure normal function size.  Bubble study negative.  No pericardial effusion.  EKG with no acute changes from prior with normal sinus rhythm.   Patient was seen by stroke neurology in follow-up.  Aspirin 81 mg for secondary stroke prevention.  Continue prior to admission statin.  Discussion regarding hospital stay with stroke neurology with differential of negative MRI stroke or stroke aborted by IVT.  Heart monitor on discharge.  If heart monitor negative consider outpatient workup.  Patient to follow-up with stroke neurology in 6 to 8 weeks.  Zio patch on discharge.     Feeling better but still has some mild numbness lt face, BP stable at home       Hyperlipidemia Follow-Up    Are you regularly taking any medication or supplement to lower your cholesterol?   Yes- lipitor , pt stated she was taking lipitor only 2 x week before hospitalization   Are you having muscle aches or other side effects that you think could be caused by your cholesterol lowering medication?  No         Past Medical History:   Diagnosis Date    Acute CVA (cerebrovascular accident) (H) 2/6/2025    ASCUS on Pap smear 08/21/2007    - HPV, - HPV, -HPV    Benign neoplasm of skin, site unspecified     Family history of colon cancer     Family history of colon cancer     History of colposcopy  with cervical biopsy 03/01/2011    WNL    Infectious mononucleosis     Mixed hyperlipidemia     Slow transit constipation     irritable bowel syndrome  abstracted [id number]       Current Outpatient Medications   Medication Sig Dispense Refill    aspirin 81 MG EC tablet Take 1 tablet (81 mg) by mouth daily. 100 tablet 1    atorvastatin (LIPITOR) 20 MG tablet TAKE 1 TABLET BY MOUTH EVERY DAY 90 tablet 0    multivitamin w/minerals (THERA-VIT-M) tablet Take 1 tablet by mouth daily      omeprazole (PRILOSEC) 40 MG DR capsule TAKE 1 CAPSULE BY MOUTH EVERY DAY 90 capsule 2    Vitamin D3 (CHOLECALCIFEROL) 25 mcg (1000 units) tablet Take by mouth daily             Review of Systems  CONSTITUTIONAL: NEGATIVE for fever, chills,   ENT/MOUTH: NEGATIVE for ear, mouth and throat problems  RESP: NEGATIVE for significant cough or SOB  CV: NEGATIVE for chest pain, palpitations or peripheral edema  NEURO: NEGATIVE for weakness, dizziness or paresthesias          Objective           Vitals:  No vitals were obtained today due to virtual visit.    Physical Exam   GENERAL: alert and no distress  EYES: Eyes grossly normal to inspection.  No discharge or erythema, or obvious scleral/conjunctival abnormalities.  RESP: No audible wheeze, cough, or visible cyanosis.    PSYCH: Appropriate affect, tone, and pace of words          Video-Visit Details    Type of service:  Video Visit   Originating Location (pt. Location): Home    Distant Location (provider location):  On-site  Platform used for Video Visit: Anna  Signed Electronically by: Lexi Forrester MD

## 2025-03-24 ENCOUNTER — MYC MEDICAL ADVICE (OUTPATIENT)
Dept: INTERNAL MEDICINE | Facility: CLINIC | Age: 60
End: 2025-03-24
Payer: COMMERCIAL

## 2025-03-24 NOTE — TELEPHONE ENCOUNTER
Ordered in ED, routing to PCP. Please advise on results or should patient schedule appointment.     Summer RN 2:47 PM March 24, 2025   [facility]

## 2025-03-26 ENCOUNTER — TRANSFERRED RECORDS (OUTPATIENT)
Dept: HEALTH INFORMATION MANAGEMENT | Facility: CLINIC | Age: 60
End: 2025-03-26
Payer: COMMERCIAL

## 2025-03-27 NOTE — TELEPHONE ENCOUNTER
Sent [employer] message to patient.    Thank you,  Parker, Triage RN Rhina Cabrera    4:28 PM 3/27/2025

## 2025-03-27 NOTE — TELEPHONE ENCOUNTER
Reviewed zio patch-   Baseline sinus rhythm and few beats of tachycardia ( fast Heart rate)  no arrhythmia . No atrial fibrillation, pl advise pt

## 2025-04-06 DIAGNOSIS — K21.9 GASTROESOPHAGEAL REFLUX DISEASE, UNSPECIFIED WHETHER ESOPHAGITIS PRESENT: ICD-10-CM

## 2025-04-07 RX ORDER — OMEPRAZOLE 40 MG/1
CAPSULE, DELAYED RELEASE ORAL
Qty: 90 CAPSULE | Refills: 2 | Status: SHIPPED | OUTPATIENT
Start: 2025-04-07

## 2025-04-16 ENCOUNTER — OFFICE VISIT (OUTPATIENT)
Dept: INTERNAL MEDICINE | Facility: CLINIC | Age: 60
End: 2025-04-16
Payer: COMMERCIAL

## 2025-04-16 VITALS
SYSTOLIC BLOOD PRESSURE: 124 MMHG | RESPIRATION RATE: 13 BRPM | DIASTOLIC BLOOD PRESSURE: 82 MMHG | WEIGHT: 167.5 LBS | OXYGEN SATURATION: 99 % | BODY MASS INDEX: 26.29 KG/M2 | HEIGHT: 67 IN | HEART RATE: 105 BPM | TEMPERATURE: 98.2 F

## 2025-04-16 DIAGNOSIS — E78.2 MIXED HYPERLIPIDEMIA: ICD-10-CM

## 2025-04-16 DIAGNOSIS — I63.9 ACUTE CVA (CEREBROVASCULAR ACCIDENT) (H): ICD-10-CM

## 2025-04-16 DIAGNOSIS — N89.8 VAGINAL DISCHARGE: ICD-10-CM

## 2025-04-16 DIAGNOSIS — Z12.4 CERVICAL CANCER SCREENING: Primary | ICD-10-CM

## 2025-04-16 DIAGNOSIS — Z00.00 ROUTINE HISTORY AND PHYSICAL EXAMINATION OF ADULT: ICD-10-CM

## 2025-04-16 LAB
ALBUMIN SERPL BCG-MCNC: 4.9 G/DL (ref 3.5–5.2)
ALP SERPL-CCNC: 126 U/L (ref 40–150)
ALT SERPL W P-5'-P-CCNC: 40 U/L (ref 0–50)
ANION GAP SERPL CALCULATED.3IONS-SCNC: 12 MMOL/L (ref 7–15)
AST SERPL W P-5'-P-CCNC: 29 U/L (ref 0–45)
BASOPHILS # BLD AUTO: 0 10E3/UL (ref 0–0.2)
BASOPHILS NFR BLD AUTO: 0 %
BILIRUB SERPL-MCNC: 0.4 MG/DL
BUN SERPL-MCNC: 13.3 MG/DL (ref 6–20)
CALCIUM SERPL-MCNC: 10.5 MG/DL (ref 8.8–10.4)
CHLORIDE SERPL-SCNC: 105 MMOL/L (ref 98–107)
CHOLEST SERPL-MCNC: 233 MG/DL
CLUE CELLS: ABNORMAL
CREAT SERPL-MCNC: 0.84 MG/DL (ref 0.51–0.95)
EGFRCR SERPLBLD CKD-EPI 2021: 81 ML/MIN/1.73M2
EOSINOPHIL # BLD AUTO: 0.2 10E3/UL (ref 0–0.7)
EOSINOPHIL NFR BLD AUTO: 3 %
ERYTHROCYTE [DISTWIDTH] IN BLOOD BY AUTOMATED COUNT: 12.2 % (ref 10–15)
FASTING STATUS PATIENT QL REPORTED: YES
FASTING STATUS PATIENT QL REPORTED: YES
GLUCOSE SERPL-MCNC: 109 MG/DL (ref 70–99)
HCO3 SERPL-SCNC: 27 MMOL/L (ref 22–29)
HCT VFR BLD AUTO: 42.5 % (ref 35–47)
HDLC SERPL-MCNC: 65 MG/DL
HGB BLD-MCNC: 15 G/DL (ref 11.7–15.7)
IMM GRANULOCYTES # BLD: 0 10E3/UL
IMM GRANULOCYTES NFR BLD: 0 %
LDLC SERPL CALC-MCNC: 131 MG/DL
LYMPHOCYTES # BLD AUTO: 2.5 10E3/UL (ref 0.8–5.3)
LYMPHOCYTES NFR BLD AUTO: 29 %
MCH RBC QN AUTO: 30.6 PG (ref 26.5–33)
MCHC RBC AUTO-ENTMCNC: 35.3 G/DL (ref 31.5–36.5)
MCV RBC AUTO: 87 FL (ref 78–100)
MONOCYTES # BLD AUTO: 0.5 10E3/UL (ref 0–1.3)
MONOCYTES NFR BLD AUTO: 6 %
NEUTROPHILS # BLD AUTO: 5.4 10E3/UL (ref 1.6–8.3)
NEUTROPHILS NFR BLD AUTO: 63 %
NONHDLC SERPL-MCNC: 168 MG/DL
PLATELET # BLD AUTO: 299 10E3/UL (ref 150–450)
POTASSIUM SERPL-SCNC: 4.5 MMOL/L (ref 3.4–5.3)
PROT SERPL-MCNC: 8.1 G/DL (ref 6.4–8.3)
RBC # BLD AUTO: 4.9 10E6/UL (ref 3.8–5.2)
SODIUM SERPL-SCNC: 144 MMOL/L (ref 135–145)
TRICHOMONAS, WET PREP: ABNORMAL
TRIGL SERPL-MCNC: 186 MG/DL
TSH SERPL DL<=0.005 MIU/L-ACNC: 1.67 UIU/ML (ref 0.3–4.2)
WBC # BLD AUTO: 8.6 10E3/UL (ref 4–11)
WBC'S/HIGH POWER FIELD, WET PREP: ABNORMAL
YEAST, WET PREP: ABNORMAL

## 2025-04-16 RX ORDER — ATORVASTATIN CALCIUM 20 MG/1
20 TABLET, FILM COATED ORAL DAILY
Qty: 90 TABLET | Refills: 1 | Status: SHIPPED | OUTPATIENT
Start: 2025-04-16

## 2025-04-16 SDOH — HEALTH STABILITY: PHYSICAL HEALTH: ON AVERAGE, HOW MANY DAYS PER WEEK DO YOU ENGAGE IN MODERATE TO STRENUOUS EXERCISE (LIKE A BRISK WALK)?: 2 DAYS

## 2025-04-16 ASSESSMENT — SOCIAL DETERMINANTS OF HEALTH (SDOH): HOW OFTEN DO YOU GET TOGETHER WITH FRIENDS OR RELATIVES?: ONCE A WEEK

## 2025-04-16 NOTE — PROGRESS NOTES
"Preventive Care Visit  [facility]  Lexi Forrester MD, Internal Medicine  Apr 16, 2025      Assessment & Plan     (Z00.00) Routine history and physical examination of adult  Plan: CBC with Platelets & Differential, Lipid panel         reflex to direct LDL Fasting, Comprehensive         metabolic panel, TSH with free T4 reflex            (N89.8) Vaginal discharge  Plan: Wet prep - Clinic Collect            (I63.9) Acute CVA (cerebrovascular accident) (H)  Plan: On aspirin and statin, follows up with a neurologist, discussed Zio patch results with patient and patient states that she is getting repeat Zio patch through her neurologist      (E78.2) Mixed hyperlipidemia  Plan: Check lipid panel today, on Lipitor 20 mg daily refilled as directed.explained clearly about the medication,insructions and side effects.      (Z12.4) Cervical cancer screening    Plan: HPV and Gynecologic Cytology Panel -         Recommended Age 30 - 65 Years            Patient has been advised of split billing requirements and indicates understanding: Yes        BMI  Estimated body mass index is 26.04 kg/m  as calculated from the following:    Height as of this encounter: 1.708 m (5' 7.25\").    Weight as of this encounter: 76 kg (167 lb 8 oz).   Weight management plan: Discussed healthy diet and exercise guidelines    Counseling  Appropriate preventive services were addressed with this patient via screening, questionnaire, or discussion as appropriate for fall prevention, nutrition, physical activity, Tobacco-use cessation, social engagement, weight loss and cognition.  Checklist reviewing preventive services available has been given to the patient.  Reviewed patient's diet, addressing concerns and/or questions.   She is at risk for lack of exercise and has been provided with information to increase physical activity for the benefit of her well-being.   She is at risk for psychosocial distress and has been " provided with information to reduce risk.       The longitudinal plan of care for the diagnosis(es)/condition(s) as documented were addressed during this visit. Due to the added complexity in care, I will continue to support Shanna in the subsequent management and with ongoing continuity of care.      Subjective   Shanna is a 59 year old, presenting for the following:  Physical        4/16/2025     8:53 AM   Additional Questions   Roomed by tawanda   Accompanied by self         4/16/2025     8:53 AM   Patient Reported Additional Medications   Patient reports taking the following new medications none          HPI     Hyperlipidemia Follow-Up    Are you regularly taking any medication or supplement to lower your cholesterol?   Yes- lipitor  Are you having muscle aches or other side effects that you think could be caused by your cholesterol lowering medication?  No      Cerebrovascular Follow-up    Patient history: ischemic cerebrovascular incident  Residual symptoms: weakness on lt side of the face   Worsened or new symptoms since last visit: No  Daily aspirin use: Yes  Hypertension controlled: Yes       Advance Care Planning    Document on file is a Health Care Directive or POLST.        4/16/2025   General Health   How would you rate your overall physical health? Good   Feel stress (tense, anxious, or unable to sleep) Very much   (!) STRESS CONCERN      4/16/2025   Nutrition   Three or more servings of calcium each day? Yes   Diet: Regular (no restrictions)   How many servings of fruit and vegetables per day? (!) 2-3   How many sweetened beverages each day? 0-1         4/16/2025   Exercise   Days per week of moderate/strenous exercise 2 days   (!) EXERCISE CONCERN      4/16/2025   Social Factors   Frequency of gathering with friends or relatives Once a week   Worry food won't last until get money to buy more No   Food not last or not have enough money for food? No   Do you have housing? (Housing is defined as stable  permanent housing and does not include staying ouside in a car, in a tent, in an abandoned building, in an overnight shelter, or couch-surfing.) Yes   Are you worried about losing your housing? No   Lack of transportation? No   Unable to get utilities (heat,electricity)? No         4/16/2025   Fall Risk   Fallen 2 or more times in the past year? No    No   Trouble with walking or balance? No    No       Multiple values from one day are sorted in reverse-chronological order          4/16/2025   Dental   Dentist two times every year? Yes           Today's PHQ-2 Score:       2/12/2025     2:00 PM   PHQ-2 ( 1999 [employer])   Q1: Little interest or pleasure in doing things 0   Q2: Feeling down, depressed or hopeless 0   PHQ-2 Score 0         4/16/2025   Substance Use   Alcohol more than 3/day or more than 7/wk No   Do you use any other substances recreationally? No     Social History     Tobacco Use    Smoking status: Never    Smokeless tobacco: Never   Substance Use Topics    Alcohol use: Yes     Comment: Rarely    Drug use: No           1/24/2024   LAST FHS-7 RESULTS   1st degree relative breast or ovarian cancer Yes   Any relative bilateral breast cancer No       has had genetic counseling   Bilateral mastectomy           4/16/2025   One time HIV Screening   Previous HIV test? No         4/16/2025   STI Screening   New sexual partner(s) since last STI/HIV test? No     History of abnormal Pap smear: YES - reflected in Problem List and Health Maintenance accordingly        Latest Ref Rng & Units 12/16/2019     1:30 PM 12/16/2019     1:15 PM 11/5/2014    12:00 AM   PAP / HPV   PAP (Historical)   NIL  NIL    HPV 16 DNA NEG^Negative Negative      HPV 18 DNA NEG^Negative Negative      Other HR HPV NEG^Negative Negative        ASCVD Risk   The ASCVD Risk score (Dilan MONTERO, et al., 2019) failed to calculate for the following reasons:    Risk score cannot be calculated because patient has a medical history suggesting  prior/existing ASCVD           Reviewed and updated as needed this visit by Provider                    Past Medical History:   Diagnosis Date    Acute CVA (cerebrovascular accident) (H) 2/6/2025    ASCUS on Pap smear 08/21/2007    - HPV, - HPV, -HPV    Benign neoplasm of skin, site unspecified     Family history of colon cancer     Family history of colon cancer     History of colposcopy with cervical biopsy 03/01/2011    WNL    Infectious mononucleosis     Mixed hyperlipidemia     Slow transit constipation     irritable bowel syndrome  abstracted [id number]     Past Surgical History:   Procedure Laterality Date    BREAST SURGERY  1/11/2017    prophylactic double mastectomy    COLONOSCOPY N/A 9/11/2024    Procedure: Colonoscopy;  Surgeon: Hank Swift MD;  Location: [facility]    Mastectomy Bilateral 01/11/2017    prophylactic double mastectomy: 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, 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 100 tablet 1    atorvastatin (LIPITOR) 20 MG tablet TAKE 1 TABLET BY MOUTH EVERY DAY 90 tablet 0    multivitamin w/minerals (THERA-VIT-M) tablet Take 1 tablet by mouth daily      omeprazole (PRILOSEC) 40 MG DR capsule TAKE 1 CAPSULE BY MOUTH EVERY DAY 90 capsule 2    Vitamin D3 (CHOLECALCIFEROL) 25 mcg (1000 units) tablet Take by mouth daily           Review of Systems  CONSTITUTIONAL: NEGATIVE for fever, chills,    EYES: NEGATIVE for vision changes or irritation  ENT/MOUTH: NEGATIVE for ear, mouth and throat problems  RESP: NEGATIVE for significant cough or SOB  BREAST: NEGATIVE for masses, tenderness or discharge  CV: NEGATIVE for chest pain, palpitations or peripheral edema  GI: NEGATIVE for nausea, abdominal pain, heartburn, or change in bowel habits  : NEGATIVE for frequency, dysuria, or hematuria  MUSCULOSKELETAL: NEGATIVE for significant arthralgias or myalgia  NEURO: NEGATIVE for weakness, dizziness  "  ENDOCRINE: NEGATIVE for temperature intolerance, skin/hair changes  HEME: NEGATIVE for bleeding problems  PSYCHIATRIC: NEGATIVE for changes in mood or affect     Objective    Exam  /82   Pulse 105   Temp 98.2  F (36.8  C) (Tympanic)   Resp 13   Ht 1.708 m (5' 7.25\")   Wt 76 kg (167 lb 8 oz)   SpO2 99%   BMI 26.04 kg/m     Estimated body mass index is 26.04 kg/m  as calculated from the following:    Height as of this encounter: 1.708 m (5' 7.25\").    Weight as of this encounter: 76 kg (167 lb 8 oz).    Physical Exam  GENERAL: alert and no distress  EYES: Eyes grossly normal to inspection, PERRL and conjunctivae and sclerae normal  HENT: ear canals and TM's normal, nose and mouth without ulcers or lesions  RESP: lungs clear to auscultation - no rales, rhonchi or wheezes  CV: regular rate and rhythm, normal S1 S2,    ABDOMEN: soft, nontender, and bowel sounds normal   (female) w/bimanual: normal female external genitalia, normal urethral meatus, normal vaginal mucosa, and normal cervix/adnexa without masses.  White discharge noted, wet prep done, Pap obtained  MS: no gross musculoskeletal defects noted, no edema  NEURO: Normal strength and tone, mentation intact and speech normal  PSYCH: mentation appears normal, affect normal/bright        Signed Electronically by: Lexi Forrester MD    "

## 2025-04-16 NOTE — NURSING NOTE
"/82   Pulse 105   Temp 98.2  F (36.8  C) (Tympanic)   Resp 13   Ht 1.708 m (5' 7.25\")   Wt 76 kg (167 lb 8 oz)   SpO2 99%   BMI 26.04 kg/m        Prior to immunization administration, verified patients identity using patient s name and date of birth. Please see Immunization Activity for additional information.     Screening Questionnaire for Adult Immunization    Are you sick today?   No   Do you have allergies to medications, food, a vaccine component or latex?   No   Have you ever had a serious reaction after receiving a vaccination?   No   Do you have a long-term health problem with heart, lung, kidney, or metabolic disease (e.g., diabetes), asthma, a blood disorder, no spleen, complement component deficiency, a cochlear implant, or a spinal fluid leak?  Are you on long-term aspirin therapy?   No   Do you have cancer, leukemia, HIV/AIDS, or any other immune system problem?   No   Do you have a parent, brother, or sister with an immune system problem?   No   In the past 3 months, have you taken medications that affect  your immune system, such as prednisone, other steroids, or anticancer drugs; drugs for the treatment of rheumatoid arthritis, Crohn s disease, or psoriasis; or have you had radiation treatments?   No   Have you had a seizure, or a brain or other nervous system problem?   No   During the past year, have you received a transfusion of blood or blood    products, or been given immune (gamma) globulin or antiviral drug?   No   For women: Are you pregnant or is there a chance you could become       pregnant during the next month?   No   Have you received any vaccinations in the past 4 weeks?   No     Immunization questionnaire answers were all negative.      Patient instructed to remain in clinic for 15 minutes afterwards, and to report any adverse reactions.     Screening performed by Laura Staples MA on 4/16/2025 at 9:42 AM.        "

## 2025-04-17 LAB
HPV HR 12 DNA CVX QL NAA+PROBE: NEGATIVE
HPV16 DNA CVX QL NAA+PROBE: NEGATIVE
HPV18 DNA CVX QL NAA+PROBE: NEGATIVE
HUMAN PAPILLOMA VIRUS FINAL DIAGNOSIS: NORMAL

## 2025-04-19 DIAGNOSIS — E78.2 MIXED HYPERLIPIDEMIA: Primary | ICD-10-CM

## 2025-04-19 RX ORDER — ATORVASTATIN CALCIUM 40 MG/1
40 TABLET, FILM COATED ORAL DAILY
Qty: 90 TABLET | Refills: 0 | Status: SHIPPED | OUTPATIENT
Start: 2025-04-19

## 2025-04-21 LAB
BKR AP ASSOCIATED HPV REPORT: NORMAL
BKR LAB AP GYN ADEQUACY: NORMAL
BKR LAB AP GYN INTERPRETATION: NORMAL
BKR LAB AP PREVIOUS ABNORMAL: NORMAL
PATH REPORT.COMMENTS IMP SPEC: NORMAL
PATH REPORT.COMMENTS IMP SPEC: NORMAL
PATH REPORT.RELEVANT HX SPEC: NORMAL

## 2025-05-02 ENCOUNTER — PATIENT OUTREACH (OUTPATIENT)
Dept: NEUROLOGY | Facility: CLINIC | Age: 60
End: 2025-05-02
Payer: COMMERCIAL

## 2025-05-02 NOTE — PROGRESS NOTES
Stroke RN Care Coordination - Unable to Reach / Voicemail Note     Stroke RN Care Coordinator Outreach:  Stroke (90 Day mRS)       Outreach attempted x 1.      Left message on patient's voicemail with call back information and requested return call.    Stroke RN Care Coordinator will try to reach patient again in 1-2 business days.      Irlanda POOLE, RN, SCRN  RN Stroke Neurology Care Coordinator  [facility] Neuroscience Service Line

## 2025-05-06 NOTE — PROGRESS NOTES
"    Stroke RN Care Coordination - 90 Day Modified [place] Note     SITUATION     Shanna Sutton is a 59 year old female who is receiving support for:  Stroke (90 Day mRS)    BACKGROUND     Patient admitted to [facility] 2/6-2/7 for stroke s/p TNK.    ASSESSMENT     Current Modified [place] Scale (90 Day)  Modified [place] Score - 90 Day: 1 (5/6/2025  9:17 AM)     05/06/25 0917   Simplified Modified [place] Scale Questionnaire (smRSq)   Could you live alone without help from another person?  This means being able to bathe, use the toilet, shop, prepare or get meals and manage finances. Yes   Can you do everything you were doing right before your stroke, even if slower and not as much? Yes   Are you completely back to the way you were right before your stroke? No, score is 1   Modified [place] Score - 90 Day 1       PLAN     Follow-up plan:  Patient asked about intermittent fatigue/feeling \"crummy\" on some days still. I discussed fatigue/brain sensitivity to overstimulation as well as worsening symptoms related to poor sleep or dehydration. Advised rest and hydration on those \"off days\" and that it is common yet also understandably frustrating when it occurs. Pt was appreciative for the assurance and advice. She did not have any other questions or concerns at this time. I noted that she followed up with [facility] in March, but she states that follow-up was not overly helpful. I provided her with my direct contact information as well as our main line should she have future stroke related questions or concerns.       Irlanda Higginbotham BS, RN, SCRN  RN Stroke Neurology Care Coordinator  [facility] Neuroscience Service Line    "

## 2025-05-06 NOTE — PROGRESS NOTES
Stroke RN Care Coordination - Unable to Reach / Voicemail Note     Stroke RN Care Coordinator Outreach:  Stroke (90 Day mRS)       Outreach attempted x 2.      Left message on patient's voicemail with call back information and requested return call.    Stroke RN Care Coordinator will do no further outreaches at this time.      Irlanda Higginbotham BS, RN, SCRN  RN Stroke Neurology Care Coordinator  [facility] Service Line

## 2025-08-03 DIAGNOSIS — E78.2 MIXED HYPERLIPIDEMIA: ICD-10-CM

## 2025-08-04 RX ORDER — ATORVASTATIN CALCIUM 40 MG/1
40 TABLET, FILM COATED ORAL DAILY
Qty: 90 TABLET | Refills: 1 | Status: SHIPPED | OUTPATIENT
Start: 2025-08-04

## (undated) DEVICE — KIT ENDO TURNOVER/PROCEDURE W/CLEAN A SCOPE LINERS 103888

## (undated) DEVICE — ENDO SNARE POLYPECTOMY OVAL 15MM LOOP SD-240U-15

## (undated) DEVICE — ENDO TRAP POLYP QUICK CATCH 710201

## (undated) RX ORDER — DIPHENHYDRAMINE HYDROCHLORIDE 50 MG/ML
INJECTION INTRAMUSCULAR; INTRAVENOUS
Status: DISPENSED
Start: 2019-04-24

## (undated) RX ORDER — BUPIVACAINE HYDROCHLORIDE 5 MG/ML
INJECTION, SOLUTION PERINEURAL
Status: DISPENSED
Start: 2019-10-06

## (undated) RX ORDER — FENTANYL CITRATE 50 UG/ML
INJECTION, SOLUTION INTRAMUSCULAR; INTRAVENOUS
Status: DISPENSED
Start: 2024-09-11

## (undated) RX ORDER — FENTANYL CITRATE 50 UG/ML
INJECTION, SOLUTION INTRAMUSCULAR; INTRAVENOUS
Status: DISPENSED
Start: 2019-04-24